# Patient Record
Sex: MALE | Race: WHITE | NOT HISPANIC OR LATINO | Employment: OTHER | URBAN - METROPOLITAN AREA
[De-identification: names, ages, dates, MRNs, and addresses within clinical notes are randomized per-mention and may not be internally consistent; named-entity substitution may affect disease eponyms.]

---

## 2023-03-26 ENCOUNTER — ANESTHESIA EVENT (OUTPATIENT)
Dept: ENDOSCOPY | Facility: HOSPITAL | Age: 68
DRG: 023 | End: 2023-03-26
Payer: MEDICARE

## 2023-03-26 ENCOUNTER — ANESTHESIA (OUTPATIENT)
Dept: ENDOSCOPY | Facility: HOSPITAL | Age: 68
DRG: 023 | End: 2023-03-26
Payer: MEDICARE

## 2023-03-26 ENCOUNTER — HOSPITAL ENCOUNTER (INPATIENT)
Facility: HOSPITAL | Age: 68
LOS: 2 days | Discharge: HOME | DRG: 023 | End: 2023-03-28
Attending: EMERGENCY MEDICINE | Admitting: PSYCHIATRY & NEUROLOGY
Payer: MEDICARE

## 2023-03-26 DIAGNOSIS — I63.9 ISCHEMIC STROKE: ICD-10-CM

## 2023-03-26 DIAGNOSIS — I63.412 ACUTE CEREBROVASCULAR ACCIDENT (CVA) DUE TO EMBOLISM OF LEFT MIDDLE CEREBRAL ARTERY: ICD-10-CM

## 2023-03-26 DIAGNOSIS — I63.9 STROKE: Primary | ICD-10-CM

## 2023-03-26 DIAGNOSIS — I42.2 APICAL VARIANT HYPERTROPHIC CARDIOMYOPATHY: ICD-10-CM

## 2023-03-26 PROBLEM — C61 PROSTATE CANCER: Status: RESOLVED | Noted: 2023-03-26 | Resolved: 2023-03-26

## 2023-03-26 PROBLEM — C61 PROSTATE CANCER: Status: ACTIVE | Noted: 2023-03-26

## 2023-03-26 PROBLEM — G93.6 CYTOTOXIC BRAIN EDEMA: Status: ACTIVE | Noted: 2023-03-26

## 2023-03-26 PROBLEM — Z85.46 HISTORY OF MALIGNANT NEOPLASM OF PROSTATE: Status: ACTIVE | Noted: 2023-03-26

## 2023-03-26 PROBLEM — Z92.82 RECEIVED INTRAVENOUS TISSUE PLASMINOGEN ACTIVATOR (TPA) IN EMERGENCY DEPARTMENT: Status: ACTIVE | Noted: 2023-03-26

## 2023-03-26 PROBLEM — R47.01 APHASIA: Status: ACTIVE | Noted: 2023-03-26

## 2023-03-26 LAB
ABO + RH BLD: NORMAL
ALBUMIN SERPL BCP-MCNC: 3.6 G/DL (ref 3.5–5.2)
ALLENS TEST: NORMAL
ALP SERPL-CCNC: 75 U/L (ref 55–135)
ALT SERPL W/O P-5'-P-CCNC: 22 U/L (ref 10–44)
ANION GAP SERPL CALC-SCNC: 10 MMOL/L (ref 8–16)
ANION GAP SERPL CALC-SCNC: 11 MMOL/L (ref 8–16)
ASCENDING AORTA: 3.14 CM
AST SERPL-CCNC: 42 U/L (ref 10–40)
AV INDEX (PROSTH): 0.93
AV MEAN GRADIENT: 4 MMHG
AV PEAK GRADIENT: 7 MMHG
AV VALVE AREA: 3.1 CM2
AV VELOCITY RATIO: 1.02
BASOPHILS # BLD AUTO: 0.02 K/UL (ref 0–0.2)
BASOPHILS NFR BLD: 0.3 % (ref 0–1.9)
BILIRUB SERPL-MCNC: 0.4 MG/DL (ref 0.1–1)
BLD GP AB SCN CELLS X3 SERPL QL: NORMAL
BSA FOR ECHO PROCEDURE: 2.05 M2
BUN SERPL-MCNC: 20 MG/DL (ref 8–23)
BUN SERPL-MCNC: 22 MG/DL (ref 8–23)
CALCIUM SERPL-MCNC: 8.7 MG/DL (ref 8.7–10.5)
CALCIUM SERPL-MCNC: 9.4 MG/DL (ref 8.7–10.5)
CHLORIDE SERPL-SCNC: 104 MMOL/L (ref 95–110)
CHLORIDE SERPL-SCNC: 104 MMOL/L (ref 95–110)
CHOLEST SERPL-MCNC: 174 MG/DL (ref 120–199)
CHOLEST/HDLC SERPL: 6.7 {RATIO} (ref 2–5)
CK MB SERPL-MCNC: 1 NG/ML (ref 0.1–6.5)
CK MB SERPL-RTO: 1.7 % (ref 0–5)
CK SERPL-CCNC: 60 U/L (ref 20–200)
CO2 SERPL-SCNC: 19 MMOL/L (ref 23–29)
CO2 SERPL-SCNC: 21 MMOL/L (ref 23–29)
COMPLEXED PSA SERPL-MCNC: 0.03 NG/ML (ref 0–4)
CREAT SERPL-MCNC: 1 MG/DL (ref 0.5–1.4)
CREAT SERPL-MCNC: 1.1 MG/DL (ref 0.5–1.4)
CREAT SERPL-MCNC: 1.2 MG/DL (ref 0.5–1.4)
CV ECHO LV RWT: 0.38 CM
DIFFERENTIAL METHOD: ABNORMAL
DOP CALC AO PEAK VEL: 1.35 M/S
DOP CALC AO VTI: 25.98 CM
DOP CALC LVOT AREA: 3.3 CM2
DOP CALC LVOT DIAMETER: 2.06 CM
DOP CALC LVOT PEAK VEL: 1.38 M/S
DOP CALC LVOT STROKE VOLUME: 80.48 CM3
DOP CALCLVOT PEAK VEL VTI: 24.16 CM
E WAVE DECELERATION TIME: 299.31 MSEC
E/A RATIO: 1.2
E/E' RATIO: 7.33 M/S
ECHO LV POSTERIOR WALL: 0.82 CM (ref 0.6–1.1)
EJECTION FRACTION: 50 %
EOSINOPHIL # BLD AUTO: 0.1 K/UL (ref 0–0.5)
EOSINOPHIL NFR BLD: 2.4 % (ref 0–8)
ERYTHROCYTE [DISTWIDTH] IN BLOOD BY AUTOMATED COUNT: 12.3 % (ref 11.5–14.5)
EST. GFR  (NO RACE VARIABLE): >60 ML/MIN/1.73 M^2
EST. GFR  (NO RACE VARIABLE): >60 ML/MIN/1.73 M^2
ESTIMATED AVG GLUCOSE: 103 MG/DL (ref 68–131)
FRACTIONAL SHORTENING: 32 % (ref 28–44)
GLUCOSE SERPL-MCNC: 137 MG/DL (ref 70–110)
GLUCOSE SERPL-MCNC: 86 MG/DL (ref 70–110)
HBA1C MFR BLD: 5.2 % (ref 4–5.6)
HCT VFR BLD AUTO: 37.5 % (ref 40–54)
HCV AB SERPL QL IA: NORMAL
HDLC SERPL-MCNC: 26 MG/DL (ref 40–75)
HDLC SERPL: 14.9 % (ref 20–50)
HGB BLD-MCNC: 12.8 G/DL (ref 14–18)
HIV 1+2 AB+HIV1 P24 AG SERPL QL IA: NORMAL
IMM GRANULOCYTES # BLD AUTO: 0.02 K/UL (ref 0–0.04)
IMM GRANULOCYTES NFR BLD AUTO: 0.3 % (ref 0–0.5)
INR PPP: 1.1 (ref 0.8–1.2)
INTERVENTRICULAR SEPTUM: 0.82 CM (ref 0.6–1.1)
IVRT: 97.05 MSEC
LA MAJOR: 5.6 CM
LA MINOR: 5.43 CM
LA WIDTH: 4.39 CM
LDH SERPL L TO P-CCNC: 1.67 MMOL/L (ref 0.5–2.2)
LDLC SERPL CALC-MCNC: ABNORMAL MG/DL (ref 63–159)
LEFT ATRIUM SIZE: 3.49 CM
LEFT ATRIUM VOLUME INDEX MOD: 28.2 ML/M2
LEFT ATRIUM VOLUME INDEX: 35.9 ML/M2
LEFT ATRIUM VOLUME MOD: 56.35 CM3
LEFT ATRIUM VOLUME: 71.8 CM3
LEFT INTERNAL DIMENSION IN SYSTOLE: 2.98 CM (ref 2.1–4)
LEFT VENTRICLE DIASTOLIC VOLUME INDEX: 42.82 ML/M2
LEFT VENTRICLE DIASTOLIC VOLUME: 85.63 ML
LEFT VENTRICLE MASS INDEX: 56 G/M2
LEFT VENTRICLE SYSTOLIC VOLUME INDEX: 17.3 ML/M2
LEFT VENTRICLE SYSTOLIC VOLUME: 34.54 ML
LEFT VENTRICULAR INTERNAL DIMENSION IN DIASTOLE: 4.36 CM (ref 3.5–6)
LEFT VENTRICULAR MASS: 111.35 G
LV LATERAL E/E' RATIO: 6.88 M/S
LV SEPTAL E/E' RATIO: 7.86 M/S
LYMPHOCYTES # BLD AUTO: 2 K/UL (ref 1–4.8)
LYMPHOCYTES NFR BLD: 34.6 % (ref 18–48)
MAGNESIUM SERPL-MCNC: 1.9 MG/DL (ref 1.6–2.6)
MCH RBC QN AUTO: 29.7 PG (ref 27–31)
MCHC RBC AUTO-ENTMCNC: 34.1 G/DL (ref 32–36)
MCV RBC AUTO: 87 FL (ref 82–98)
MONOCYTES # BLD AUTO: 0.6 K/UL (ref 0.3–1)
MONOCYTES NFR BLD: 10.6 % (ref 4–15)
MV PEAK A VEL: 0.46 M/S
MV PEAK E VEL: 0.55 M/S
MV STENOSIS PRESSURE HALF TIME: 86.8 MS
MV VALVE AREA P 1/2 METHOD: 2.53 CM2
NEUTROPHILS # BLD AUTO: 3 K/UL (ref 1.8–7.7)
NEUTROPHILS NFR BLD: 51.8 % (ref 38–73)
NONHDLC SERPL-MCNC: 148 MG/DL
NRBC BLD-RTO: 0 /100 WBC
PHOSPHATE SERPL-MCNC: 3.7 MG/DL (ref 2.7–4.5)
PISA TR MAX VEL: 1.51 M/S
PLATELET # BLD AUTO: 194 K/UL (ref 150–450)
PMV BLD AUTO: 9.7 FL (ref 9.2–12.9)
POCT GLUCOSE: 117 MG/DL (ref 70–110)
POCT GLUCOSE: 132 MG/DL (ref 70–110)
POTASSIUM SERPL-SCNC: 3.2 MMOL/L (ref 3.5–5.1)
POTASSIUM SERPL-SCNC: 4.9 MMOL/L (ref 3.5–5.1)
PROT SERPL-MCNC: 6 G/DL (ref 6–8.4)
PROTHROMBIN TIME: 11.8 SEC (ref 9–12.5)
RA MAJOR: 4.39 CM
RA PRESSURE: 8 MMHG
RA WIDTH: 3.01 CM
RBC # BLD AUTO: 4.31 M/UL (ref 4.6–6.2)
RIGHT VENTRICULAR END-DIASTOLIC DIMENSION: 2.72 CM
RV TISSUE DOPPLER FREE WALL SYSTOLIC VELOCITY 1 (APICAL 4 CHAMBER VIEW): 15.19 CM/S
SAMPLE: NORMAL
SAMPLE: NORMAL
SINUS: 3.84 CM
SITE: NORMAL
SODIUM SERPL-SCNC: 133 MMOL/L (ref 136–145)
SODIUM SERPL-SCNC: 136 MMOL/L (ref 136–145)
SPECIMEN OUTDATE: NORMAL
STJ: 2.64 CM
TDI LATERAL: 0.08 M/S
TDI SEPTAL: 0.07 M/S
TDI: 0.08 M/S
TR MAX PG: 9 MMHG
TRICUSPID ANNULAR PLANE SYSTOLIC EXCURSION: 1.68 CM
TRIGL SERPL-MCNC: 561 MG/DL (ref 30–150)
TROPONIN I SERPL DL<=0.01 NG/ML-MCNC: 0.01 NG/ML (ref 0–0.03)
TROPONIN I SERPL DL<=0.01 NG/ML-MCNC: 0.01 NG/ML (ref 0–0.03)
TSH SERPL DL<=0.005 MIU/L-ACNC: 3.35 UIU/ML (ref 0.4–4)
TV REST PULMONARY ARTERY PRESSURE: 17 MMHG
WBC # BLD AUTO: 5.84 K/UL (ref 3.9–12.7)

## 2023-03-26 PROCEDURE — 83036 HEMOGLOBIN GLYCOSYLATED A1C: CPT | Performed by: EMERGENCY MEDICINE

## 2023-03-26 PROCEDURE — 99291 PR CRITICAL CARE, E/M 30-74 MINUTES: ICD-10-PCS | Mod: 25,,,

## 2023-03-26 PROCEDURE — 93005 ELECTROCARDIOGRAM TRACING: CPT

## 2023-03-26 PROCEDURE — 85610 PROTHROMBIN TIME: CPT | Performed by: EMERGENCY MEDICINE

## 2023-03-26 PROCEDURE — 92610 EVALUATE SWALLOWING FUNCTION: CPT

## 2023-03-26 PROCEDURE — D9220A PRA ANESTHESIA: ICD-10-PCS | Mod: ANES,,, | Performed by: ANESTHESIOLOGY

## 2023-03-26 PROCEDURE — 84443 ASSAY THYROID STIM HORMONE: CPT | Performed by: EMERGENCY MEDICINE

## 2023-03-26 PROCEDURE — 84484 ASSAY OF TROPONIN QUANT: CPT

## 2023-03-26 PROCEDURE — 84153 ASSAY OF PSA TOTAL: CPT

## 2023-03-26 PROCEDURE — 85025 COMPLETE CBC W/AUTO DIFF WBC: CPT | Performed by: EMERGENCY MEDICINE

## 2023-03-26 PROCEDURE — 97116 GAIT TRAINING THERAPY: CPT

## 2023-03-26 PROCEDURE — 99291 CRITICAL CARE FIRST HOUR: CPT | Mod: 25,,,

## 2023-03-26 PROCEDURE — 82553 CREATINE MB FRACTION: CPT

## 2023-03-26 PROCEDURE — 25000003 PHARM REV CODE 250

## 2023-03-26 PROCEDURE — 85610 PROTHROMBIN TIME: CPT

## 2023-03-26 PROCEDURE — 84100 ASSAY OF PHOSPHORUS: CPT

## 2023-03-26 PROCEDURE — D9220A PRA ANESTHESIA: Mod: CRNA,,, | Performed by: NURSE ANESTHETIST, CERTIFIED REGISTERED

## 2023-03-26 PROCEDURE — 83735 ASSAY OF MAGNESIUM: CPT

## 2023-03-26 PROCEDURE — 63600175 PHARM REV CODE 636 W HCPCS: Performed by: NURSE ANESTHETIST, CERTIFIED REGISTERED

## 2023-03-26 PROCEDURE — 80061 LIPID PANEL: CPT | Performed by: EMERGENCY MEDICINE

## 2023-03-26 PROCEDURE — 96374 THER/PROPH/DIAG INJ IV PUSH: CPT

## 2023-03-26 PROCEDURE — 99900035 HC TECH TIME PER 15 MIN (STAT)

## 2023-03-26 PROCEDURE — 97165 OT EVAL LOW COMPLEX 30 MIN: CPT

## 2023-03-26 PROCEDURE — 63600175 PHARM REV CODE 636 W HCPCS

## 2023-03-26 PROCEDURE — 25500020 PHARM REV CODE 255: Performed by: PSYCHIATRY & NEUROLOGY

## 2023-03-26 PROCEDURE — 99291 PR CRITICAL CARE, E/M 30-74 MINUTES: ICD-10-PCS | Mod: ,,, | Performed by: EMERGENCY MEDICINE

## 2023-03-26 PROCEDURE — 99285 EMERGENCY DEPT VISIT HI MDM: CPT | Mod: 25

## 2023-03-26 PROCEDURE — 25000003 PHARM REV CODE 250: Performed by: RADIOLOGY

## 2023-03-26 PROCEDURE — 97161 PT EVAL LOW COMPLEX 20 MIN: CPT

## 2023-03-26 PROCEDURE — 37000009 HC ANESTHESIA EA ADD 15 MINS

## 2023-03-26 PROCEDURE — D9220A PRA ANESTHESIA: ICD-10-PCS | Mod: CRNA,,, | Performed by: NURSE ANESTHETIST, CERTIFIED REGISTERED

## 2023-03-26 PROCEDURE — 99291 CRITICAL CARE FIRST HOUR: CPT | Mod: ,,, | Performed by: EMERGENCY MEDICINE

## 2023-03-26 PROCEDURE — 25000003 PHARM REV CODE 250: Performed by: NURSE PRACTITIONER

## 2023-03-26 PROCEDURE — 25500020 PHARM REV CODE 255: Performed by: EMERGENCY MEDICINE

## 2023-03-26 PROCEDURE — 93010 ELECTROCARDIOGRAM REPORT: CPT | Mod: ,,, | Performed by: INTERNAL MEDICINE

## 2023-03-26 PROCEDURE — 99223 PR INITIAL HOSPITAL CARE,LEVL III: ICD-10-PCS | Mod: ,,, | Performed by: PSYCHIATRY & NEUROLOGY

## 2023-03-26 PROCEDURE — 82565 ASSAY OF CREATININE: CPT

## 2023-03-26 PROCEDURE — D9220A PRA ANESTHESIA: Mod: ANES,,, | Performed by: ANESTHESIOLOGY

## 2023-03-26 PROCEDURE — 37000008 HC ANESTHESIA 1ST 15 MINUTES

## 2023-03-26 PROCEDURE — 99292 PR CRITICAL CARE, ADDL 30 MIN: ICD-10-PCS | Mod: ,,, | Performed by: PSYCHIATRY & NEUROLOGY

## 2023-03-26 PROCEDURE — 25000003 PHARM REV CODE 250: Performed by: NURSE ANESTHETIST, CERTIFIED REGISTERED

## 2023-03-26 PROCEDURE — 87389 HIV-1 AG W/HIV-1&-2 AB AG IA: CPT | Performed by: EMERGENCY MEDICINE

## 2023-03-26 PROCEDURE — 63600175 PHARM REV CODE 636 W HCPCS: Mod: JG | Performed by: NURSE PRACTITIONER

## 2023-03-26 PROCEDURE — 93010 EKG 12-LEAD: ICD-10-PCS | Mod: ,,, | Performed by: INTERNAL MEDICINE

## 2023-03-26 PROCEDURE — A4216 STERILE WATER/SALINE, 10 ML: HCPCS | Performed by: NURSE PRACTITIONER

## 2023-03-26 PROCEDURE — 94761 N-INVAS EAR/PLS OXIMETRY MLT: CPT

## 2023-03-26 PROCEDURE — 25000003 PHARM REV CODE 250: Performed by: PSYCHIATRY & NEUROLOGY

## 2023-03-26 PROCEDURE — 97112 NEUROMUSCULAR REEDUCATION: CPT

## 2023-03-26 PROCEDURE — 86900 BLOOD TYPING SEROLOGIC ABO: CPT

## 2023-03-26 PROCEDURE — 84484 ASSAY OF TROPONIN QUANT: CPT | Mod: 91 | Performed by: PSYCHIATRY & NEUROLOGY

## 2023-03-26 PROCEDURE — 99223 1ST HOSP IP/OBS HIGH 75: CPT | Mod: ,,, | Performed by: PSYCHIATRY & NEUROLOGY

## 2023-03-26 PROCEDURE — 97535 SELF CARE MNGMENT TRAINING: CPT

## 2023-03-26 PROCEDURE — 80048 BASIC METABOLIC PNL TOTAL CA: CPT | Mod: XB | Performed by: PSYCHIATRY & NEUROLOGY

## 2023-03-26 PROCEDURE — 20000000 HC ICU ROOM

## 2023-03-26 PROCEDURE — 80053 COMPREHEN METABOLIC PANEL: CPT | Performed by: EMERGENCY MEDICINE

## 2023-03-26 PROCEDURE — 86803 HEPATITIS C AB TEST: CPT | Performed by: EMERGENCY MEDICINE

## 2023-03-26 PROCEDURE — 99292 CRITICAL CARE ADDL 30 MIN: CPT | Mod: ,,, | Performed by: PSYCHIATRY & NEUROLOGY

## 2023-03-26 RX ORDER — ATROPINE SULFATE 0.1 MG/ML
INJECTION INTRAVENOUS
Status: DISPENSED
Start: 2023-03-26 | End: 2023-03-26

## 2023-03-26 RX ORDER — FLUOXETINE 10 MG/1
CAPSULE ORAL
COMMUNITY
Start: 2023-03-14

## 2023-03-26 RX ORDER — FENTANYL CITRATE 50 UG/ML
INJECTION, SOLUTION INTRAMUSCULAR; INTRAVENOUS
Status: DISCONTINUED | OUTPATIENT
Start: 2023-03-26 | End: 2023-03-26

## 2023-03-26 RX ORDER — NICARDIPINE HYDROCHLORIDE 0.2 MG/ML
0-15 INJECTION INTRAVENOUS CONTINUOUS
Status: DISCONTINUED | OUTPATIENT
Start: 2023-03-26 | End: 2023-03-26

## 2023-03-26 RX ORDER — POTASSIUM CHLORIDE 7.45 MG/ML
60 INJECTION INTRAVENOUS ONCE
Status: COMPLETED | OUTPATIENT
Start: 2023-03-26 | End: 2023-03-26

## 2023-03-26 RX ORDER — PROPOFOL 10 MG/ML
VIAL (ML) INTRAVENOUS
Status: DISCONTINUED | OUTPATIENT
Start: 2023-03-26 | End: 2023-03-26

## 2023-03-26 RX ORDER — TELMISARTAN 80 MG/1
1 TABLET ORAL
Status: ON HOLD | COMMUNITY
End: 2023-03-28 | Stop reason: HOSPADM

## 2023-03-26 RX ORDER — SODIUM,POTASSIUM PHOSPHATES 280-250MG
2 POWDER IN PACKET (EA) ORAL
Status: DISCONTINUED | OUTPATIENT
Start: 2023-03-26 | End: 2023-03-28

## 2023-03-26 RX ORDER — VERAPAMIL HYDROCHLORIDE 2.5 MG/ML
INJECTION, SOLUTION INTRAVENOUS
Status: COMPLETED | OUTPATIENT
Start: 2023-03-26 | End: 2023-03-26

## 2023-03-26 RX ORDER — ZINC GLUCONATE 50 MG
1 TABLET ORAL
COMMUNITY

## 2023-03-26 RX ORDER — LIDOCAINE HYDROCHLORIDE 20 MG/ML
INJECTION, SOLUTION EPIDURAL; INFILTRATION; INTRACAUDAL; PERINEURAL
Status: DISCONTINUED | OUTPATIENT
Start: 2023-03-26 | End: 2023-03-26

## 2023-03-26 RX ORDER — CETIRIZINE HYDROCHLORIDE 10 MG/1
1 CAPSULE, LIQUID FILLED ORAL
COMMUNITY

## 2023-03-26 RX ORDER — ATORVASTATIN CALCIUM 40 MG/1
40 TABLET, FILM COATED ORAL DAILY
Status: DISCONTINUED | OUTPATIENT
Start: 2023-03-26 | End: 2023-03-28 | Stop reason: HOSPADM

## 2023-03-26 RX ORDER — FLUOXETINE 10 MG/1
10 CAPSULE ORAL DAILY
Status: DISCONTINUED | OUTPATIENT
Start: 2023-03-26 | End: 2023-03-26

## 2023-03-26 RX ORDER — ACETAMINOPHEN 325 MG/1
650 TABLET ORAL EVERY 6 HOURS PRN
Status: DISCONTINUED | OUTPATIENT
Start: 2023-03-26 | End: 2023-03-28 | Stop reason: HOSPADM

## 2023-03-26 RX ORDER — SODIUM CHLORIDE 0.9 % (FLUSH) 0.9 %
10 SYRINGE (ML) INJECTION ONCE
Status: COMPLETED | OUTPATIENT
Start: 2023-03-26 | End: 2023-03-26

## 2023-03-26 RX ORDER — BUPROPION HYDROCHLORIDE 150 MG/1
150 TABLET ORAL DAILY
Status: DISCONTINUED | OUTPATIENT
Start: 2023-03-26 | End: 2023-03-26

## 2023-03-26 RX ORDER — TURMERIC 400 MG
CAPSULE ORAL
COMMUNITY

## 2023-03-26 RX ORDER — SODIUM CHLORIDE 0.9 % (FLUSH) 0.9 %
10 SYRINGE (ML) INJECTION
Status: DISCONTINUED | OUTPATIENT
Start: 2023-03-26 | End: 2023-03-28 | Stop reason: HOSPADM

## 2023-03-26 RX ORDER — ONDANSETRON 2 MG/ML
INJECTION INTRAMUSCULAR; INTRAVENOUS
Status: COMPLETED
Start: 2023-03-26 | End: 2023-03-26

## 2023-03-26 RX ORDER — MUPIROCIN 20 MG/G
OINTMENT TOPICAL 2 TIMES DAILY
Status: DISCONTINUED | OUTPATIENT
Start: 2023-03-26 | End: 2023-03-28 | Stop reason: HOSPADM

## 2023-03-26 RX ORDER — FLUOXETINE HYDROCHLORIDE 20 MG/1
20 CAPSULE ORAL DAILY
Status: DISCONTINUED | OUTPATIENT
Start: 2023-03-26 | End: 2023-03-28 | Stop reason: HOSPADM

## 2023-03-26 RX ORDER — HYDRALAZINE HYDROCHLORIDE 20 MG/ML
10 INJECTION INTRAMUSCULAR; INTRAVENOUS EVERY 4 HOURS PRN
Status: DISCONTINUED | OUTPATIENT
Start: 2023-03-26 | End: 2023-03-28 | Stop reason: HOSPADM

## 2023-03-26 RX ORDER — ONDANSETRON 2 MG/ML
INJECTION INTRAMUSCULAR; INTRAVENOUS
Status: DISCONTINUED | OUTPATIENT
Start: 2023-03-26 | End: 2023-03-26

## 2023-03-26 RX ORDER — CARVEDILOL 25 MG/1
1 TABLET ORAL
Status: ON HOLD | COMMUNITY
End: 2023-03-28 | Stop reason: CLARIF

## 2023-03-26 RX ORDER — ROCURONIUM BROMIDE 10 MG/ML
INJECTION, SOLUTION INTRAVENOUS
Status: DISCONTINUED | OUTPATIENT
Start: 2023-03-26 | End: 2023-03-26

## 2023-03-26 RX ORDER — IBUPROFEN 100 MG/5ML
1 SUSPENSION, ORAL (FINAL DOSE FORM) ORAL
COMMUNITY

## 2023-03-26 RX ORDER — HYDRALAZINE HYDROCHLORIDE 20 MG/ML
INJECTION INTRAMUSCULAR; INTRAVENOUS
Status: COMPLETED
Start: 2023-03-26 | End: 2023-03-26

## 2023-03-26 RX ORDER — CARVEDILOL 25 MG/1
25 TABLET ORAL 2 TIMES DAILY
Status: DISCONTINUED | OUTPATIENT
Start: 2023-03-26 | End: 2023-03-28 | Stop reason: HOSPADM

## 2023-03-26 RX ORDER — DEXAMETHASONE SODIUM PHOSPHATE 4 MG/ML
INJECTION, SOLUTION INTRA-ARTICULAR; INTRALESIONAL; INTRAMUSCULAR; INTRAVENOUS; SOFT TISSUE
Status: DISCONTINUED | OUTPATIENT
Start: 2023-03-26 | End: 2023-03-26

## 2023-03-26 RX ORDER — CEFDINIR 300 MG/1
300 CAPSULE ORAL 2 TIMES DAILY
Status: ON HOLD | COMMUNITY
Start: 2022-10-31 | End: 2023-03-28 | Stop reason: CLARIF

## 2023-03-26 RX ORDER — AMOXICILLIN 250 MG
1 CAPSULE ORAL 2 TIMES DAILY
Status: DISCONTINUED | OUTPATIENT
Start: 2023-03-26 | End: 2023-03-28 | Stop reason: HOSPADM

## 2023-03-26 RX ORDER — ONDANSETRON 2 MG/ML
4 INJECTION INTRAMUSCULAR; INTRAVENOUS EVERY 6 HOURS PRN
Status: DISCONTINUED | OUTPATIENT
Start: 2023-03-26 | End: 2023-03-28 | Stop reason: HOSPADM

## 2023-03-26 RX ORDER — CARVEDILOL 25 MG/1
25 TABLET ORAL 2 TIMES DAILY
COMMUNITY
Start: 2023-01-28 | End: 2023-05-03 | Stop reason: SDUPTHER

## 2023-03-26 RX ORDER — LOSARTAN POTASSIUM 50 MG/1
100 TABLET ORAL DAILY
Status: DISCONTINUED | OUTPATIENT
Start: 2023-03-26 | End: 2023-03-26

## 2023-03-26 RX ORDER — PHENYLEPHRINE HCL IN 0.9% NACL 1 MG/10 ML
SYRINGE (ML) INTRAVENOUS
Status: DISCONTINUED | OUTPATIENT
Start: 2023-03-26 | End: 2023-03-26

## 2023-03-26 RX ORDER — LANOLIN ALCOHOL/MO/W.PET/CERES
800 CREAM (GRAM) TOPICAL
Status: DISCONTINUED | OUTPATIENT
Start: 2023-03-26 | End: 2023-03-28

## 2023-03-26 RX ORDER — ATORVASTATIN CALCIUM 20 MG/1
20 TABLET, FILM COATED ORAL
COMMUNITY
Start: 2023-03-21 | End: 2023-05-03 | Stop reason: SDUPTHER

## 2023-03-26 RX ORDER — TELMISARTAN 80 MG/1
80 TABLET ORAL
COMMUNITY
Start: 2023-01-22 | End: 2023-05-03 | Stop reason: SDUPTHER

## 2023-03-26 RX ORDER — ATORVASTATIN CALCIUM 20 MG/1
1 TABLET, FILM COATED ORAL
Status: ON HOLD | COMMUNITY
End: 2023-03-28 | Stop reason: CLARIF

## 2023-03-26 RX ADMIN — SENNOSIDES AND DOCUSATE SODIUM 1 TABLET: 50; 8.6 TABLET ORAL at 09:03

## 2023-03-26 RX ADMIN — ONDANSETRON 0.5 G: 2 INJECTION INTRAMUSCULAR; INTRAVENOUS at 03:03

## 2023-03-26 RX ADMIN — Medication 10 ML: at 01:03

## 2023-03-26 RX ADMIN — HYDRALAZINE HYDROCHLORIDE 10 MG: 20 INJECTION, SOLUTION INTRAMUSCULAR; INTRAVENOUS at 04:03

## 2023-03-26 RX ADMIN — SUGAMMADEX 200 MG: 100 INJECTION, SOLUTION INTRAVENOUS at 03:03

## 2023-03-26 RX ADMIN — VERAPAMIL HYDROCHLORIDE 5 MG: 2.5 INJECTION, SOLUTION INTRAVENOUS at 03:03

## 2023-03-26 RX ADMIN — HUMAN ALBUMIN MICROSPHERES AND PERFLUTREN 0.11 MG: 10; .22 INJECTION, SOLUTION INTRAVENOUS at 03:03

## 2023-03-26 RX ADMIN — DEXAMETHASONE SODIUM PHOSPHATE 8 MG: 4 INJECTION INTRA-ARTICULAR; INTRALESIONAL; INTRAMUSCULAR; INTRAVENOUS; SOFT TISSUE at 02:03

## 2023-03-26 RX ADMIN — POTASSIUM CHLORIDE 60 MEQ: 7.46 INJECTION, SOLUTION INTRAVENOUS at 05:03

## 2023-03-26 RX ADMIN — MUPIROCIN: 20 OINTMENT TOPICAL at 09:03

## 2023-03-26 RX ADMIN — PROPOFOL 100 MG: 10 INJECTION, EMULSION INTRAVENOUS at 02:03

## 2023-03-26 RX ADMIN — NICARDIPINE HYDROCHLORIDE 2.5 MG/HR: 0.2 INJECTION, SOLUTION INTRAVENOUS at 06:03

## 2023-03-26 RX ADMIN — TENECTEPLASE 22.5 MG: KIT at 01:03

## 2023-03-26 RX ADMIN — ATORVASTATIN CALCIUM 40 MG: 40 TABLET, FILM COATED ORAL at 09:03

## 2023-03-26 RX ADMIN — ONDANSETRON 4 MG: 2 INJECTION INTRAMUSCULAR; INTRAVENOUS at 05:03

## 2023-03-26 RX ADMIN — Medication 100 MCG: at 03:03

## 2023-03-26 RX ADMIN — FLUOXETINE 20 MG: 20 CAPSULE ORAL at 04:03

## 2023-03-26 RX ADMIN — FENTANYL CITRATE 100 MCG: 50 INJECTION INTRAMUSCULAR; INTRAVENOUS at 02:03

## 2023-03-26 RX ADMIN — ROCURONIUM BROMIDE 20 MG: 10 INJECTION INTRAVENOUS at 03:03

## 2023-03-26 RX ADMIN — ONDANSETRON 4 MG: 2 INJECTION INTRAMUSCULAR; INTRAVENOUS at 02:03

## 2023-03-26 RX ADMIN — SODIUM CHLORIDE, SODIUM GLUCONATE, SODIUM ACETATE, POTASSIUM CHLORIDE, MAGNESIUM CHLORIDE, SODIUM PHOSPHATE, DIBASIC, AND POTASSIUM PHOSPHATE: .53; .5; .37; .037; .03; .012; .00082 INJECTION, SOLUTION INTRAVENOUS at 02:03

## 2023-03-26 RX ADMIN — LIDOCAINE HYDROCHLORIDE 50 MG: 20 INJECTION, SOLUTION EPIDURAL; INFILTRATION; INTRACAUDAL at 02:03

## 2023-03-26 RX ADMIN — ROCURONIUM BROMIDE 45 MG: 10 INJECTION INTRAVENOUS at 02:03

## 2023-03-26 RX ADMIN — Medication 200 MCG: at 03:03

## 2023-03-26 RX ADMIN — IOHEXOL 100 ML: 350 INJECTION, SOLUTION INTRAVENOUS at 01:03

## 2023-03-26 RX ADMIN — HYDRALAZINE HYDROCHLORIDE 10 MG: 20 INJECTION INTRAMUSCULAR; INTRAVENOUS at 04:03

## 2023-03-26 RX ADMIN — ROCURONIUM BROMIDE 5 MG: 10 INJECTION INTRAVENOUS at 02:03

## 2023-03-26 NOTE — ASSESSMENT & PLAN NOTE
SBP < 180 s/p TNK and prior to IR  SBP < 140 pending successful thrombectomy  Telmisartan 80 daily and Coreg 25 bid at home, hold in acute setting  PRN labetalol, hydralazine

## 2023-03-26 NOTE — CONSULTS
[facility] - Neuro Critical Care  Vascular Neurology  Comprehensive Stroke Center  Consult Note    Inpatient consult to Vascular (Stroke) Neurology  Consult performed by: Barby Espinal, JAMAL, NP  Consult ordered by: Estephanie Mcallister MD  Reason for consult: L MCA stroke, post TNK        Assessment/Plan:     Received intravenous tissue plasminogen activator (tPA) in emergency department  -s/p TNK in the ED.  Admitted to [facility] for close post thrombolytic monitoring.    Acute cerebrovascular accident (CVA) due to embolism of left middle cerebral artery  Peter Munoz is a 67 y.o. male with a significant medical history of HTN, HLD, prostate CA (2022) who presents to the hospital for evaluation of aphasia and RSW.   He was given TNK and taken to IR for endovascular intervention.    Antithrombotics for secondary stroke prevention: Antiplatelets: None: Hold all Antithrombotics x 24 hours after IV Thrombolytic therapy administration    Statins for secondary stroke prevention and hyperlipidemia, if present:   Statins: Atorvastatin- 40 mg daily    Aggressive risk factor modification: HTN, HLD     Rehab efforts: The patient has been evaluated by a stroke team provider and the therapy needs have been fully considered based off the presenting complaints and exam findings. The following therapy evaluations are needed: PT evaluate and treat, OT evaluate and treat, SLP evaluate and treat, PM&R evaluate for appropriate placement    Diagnostics ordered/pending: HgbA1C to assess blood glucose levels, Lipid Profile to assess cholesterol levels, MRI head without contrast to assess brain parenchyma, TTE to assess cardiac function/status , TSH to assess thyroid function    VTE prophylaxis: Mechanical prophylaxis: Place SCDs  None: Reason for No Pharmacological VTE Prophylaxis: Holding x 24 hours s/p treatment with Thrombolytic therapy    BP parameters: Infarct: Post sucessful thrombectomy, SBP <140        Aphasia  Right sided  weakness  Due to stroke.  Therapy evals pending.    Hypertrophic cardiomyopathy  -Stroke risk factor.  Documented in 2013, no TTE.  -TTE pending.    Cytotoxic brain edema  -Small area of cytotoxic cerebral edema identified when reviewing brain imaging in the territory of the L middle cerebral artery. There is no mass effect associated with it. We will continue to monitor the patients clinical exam with frequently while in neuro critical care, for any worsening of symptoms which may indicate expansion of the insult and/or area of the edema resulting in clinical change that may require acute intervention to prevent loss of function and/or death. The pattern is suggestive of embolic etiology.        History of malignant neoplasm of prostate  -Stroke risk factor.  Not currently on radiation or chemotherapy.    Mixed hyperlipidemia  -Stroke risk factor.  Triglycerides 561.  LDL invalid.  -Atorvastatin 40 mg daily    Hypertension  -Stroke risk factor.  SBP<140 post successful thrombectomy.      STROKE DOCUMENTATION     Acute Stroke Times   Last Known Normal Date: 03/25/23  Last Known Normal Time: 2230  Symptom Onset Date: 03/26/23  Symptom Onset Time: 0015  Stroke Team Called Date: 03/26/23  Stroke Team Called Time: 0114  Stroke Team Arrival Date: 03/26/23  Stroke Team Arrival Time: 0118 (In ED prior to the patient's arrival)  CT Interpretation Time: 0132  Thrombolytic Therapy Recommended: Yes  CTA Interpretation Time: 0137  Thrombectomy Recommended: Yes  Decision to Treat Time for Tenecteplase: 0132  Decision to Treat Time for IR: 0146    NIH Scale:  Interval: baseline  1a. Level of Consciousness: 0-->Alert, keenly responsive  1b. LOC Questions: 2-->Answers neither question correctly  1c. LOC Commands: 1-->Performs one task correctly  2. Best Gaze: 1-->Partial gaze palsy, gaze is abnormal in one or both eyes, but forced deviation or total gaze paresis is not present  3. Visual: 2-->Complete hemianopia  4. Facial Palsy:  2-->Partial paralysis (total or near-total paralysis of lower face)  5a. Motor Arm, Left: 0-->No drift, limb holds 90 (or 45) degrees for full 10 secs  5b. Motor Arm, Right: 4-->No movement  6a. Motor Leg, Left: 0-->No drift, leg holds 30 degree position for full 5 secs  6b. Motor Leg, Right: 3-->No effort against gravity, leg falls to bed immediately  7. Limb Ataxia: 0-->Absent  8. Sensory: 2-->Severe to total sensory loss, patient is not aware of being touched in the face, arm, and leg  9. Best Language: 3-->Mute, global aphasia, no usable speech or auditory comprehension  10. Dysarthria: 2-->Severe dysarthria, patients speech is so slurred as to be unintelligible in the absence of or out of proportion to any dysphasia, or is mute/anarthric  11. Extinction and Inattention (formerly Neglect): 2-->Profound salina-inattention/extinction more than 1 modality  Total (NIH Stroke Scale): 24    Modified Kym Score: 0  Jacqueline Coma Scale:11   ABCD2 Score:    [id number] Score:   HAS -BLED Score:   ICH Score:   Hunt & Waggoner Classification:       Thrombolysis Candidate? Yes. The risks and benefits of Tenecteplase were discussed with patient/family. Also discussed that medication is off-FDA label but that it is within the standard of care and appropriate for their treatment. The patient and/or family verbalized understanding of risks, benefits, off-label nature, and has given verbal consent for Tenecteplase. If patient was not competent, or no family was available, treatment will be administered as an emergency procedure and in what we believe to be the patients best interest.    Delays to Thrombolysis?  No    Interventional Revascularization Candidate?   Is the patient eligible for mechanical endovascular reperfusion (DIANA)?  Yes; TICI 3    Delays to Thrombectomy? No    Hemorrhagic change of an Ischemic Stroke: Does this patient have an ischemic stroke with hemorrhagic changes? No     Subjective:     History of Present  Illness:  Peter Munoz is a 67 y.o. male with a significant medical history of HTN, HLD, prostate CA (2022) who presents to the hospital for evaluation of aphasia and RSW.  HPI information gathered from review of the patient's medical record, patient's wife due to the patient having severe aphasia.  The patient was LKN around 2230.  Around 0030 the patient's wife heard a noise and woke up and noted the patient to have aphasia and right-sided weakness that continued to progress as she activated EMS.  On EMS arrival the patient was noted to have a right facial droop and right hemiplegia in addition to aphasia.  A stroke code was activated in the field and the patient was brought to the ED.  On arrival to the ED the patient is awake.  He has global aphasia, right facial droop, left gaze preference, left hemianopsia.  VAN+.  NIHSS 24.  He was taken to CT for a CTA Stroke MP.  Imaging reviewed by Dr. Sarkar.  CTH negative, CTA positive for L M1 occlusion.  Discussed the findings with the patient and his wife.  Explained the risks and benefits of thrombolytic therapy with them.  The patient's wife agreed to thrombolytic therapy.  Assessment post thrombolytic therapy mildly improved.  The patient's gaze preference resolved and he had some vocalizations though incomprehensible.  He remained w/RSW, facial droop, and expressive/receptive aphasia.  He was taken to IR for endovascular intervention.                History reviewed. No pertinent past medical history.  No past surgical history on file.  No family history on file.  Social History     Tobacco Use    Smoking status: Former   Substance Use Topics    Alcohol use: Yes     Review of patient's allergies indicates:  No Known Allergies    Medications: I have reviewed the current medication administration record.    Current Outpatient Medications   Medication Instructions    buPROPion (WELLBUTRIN XL) 150 mg, Daily         Review of Systems   Unable to perform ROS:  Patient nonverbal   HENT:  Positive for drooling.    Gastrointestinal:  Negative for diarrhea and vomiting.   Neurological:  Positive for facial asymmetry, speech difficulty, weakness and numbness.   Psychiatric/Behavioral:  Positive for confusion.    Objective:     Vital Signs (Most Recent):  Pulse: 69 (03/26/23 0204)  Resp: (!) 37 (03/26/23 0204)  BP: (!) 165/82 (03/26/23 0204)  SpO2: 96 % (03/26/23 0204)    Vital Signs Range (Last 24H):  Pulse:  [69]   Resp:  [16-37]   BP: (150-165)/(82-83)   SpO2:  [95 %-96 %]     Physical Exam  Vitals and nursing note reviewed.   Constitutional:       General: He is not in acute distress.     Appearance: He is well-developed. He is not diaphoretic.   HENT:      Head: Normocephalic and atraumatic.      Right Ear: External ear normal.      Left Ear: External ear normal.      Nose: Nose normal.      Mouth/Throat:      Mouth: Mucous membranes are moist.   Eyes:      General: No scleral icterus.        Right eye: No discharge.         Left eye: No discharge.      Extraocular Movements:      Right eye: Abnormal extraocular motion present.      Left eye: Abnormal extraocular motion present.      Conjunctiva/sclera: Conjunctivae normal.      Pupils: Pupils are equal, round, and reactive to light.      Comments: Left gaze preference   Cardiovascular:      Rate and Rhythm: Normal rate and regular rhythm.      Heart sounds: Normal heart sounds.   Pulmonary:      Effort: Pulmonary effort is normal. No respiratory distress.   Abdominal:      General: There is no distension.      Palpations: Abdomen is soft.      Tenderness: There is no abdominal tenderness.   Musculoskeletal:      Cervical back: Normal range of motion and neck supple.   Skin:     General: Skin is warm and dry.      Capillary Refill: Capillary refill takes less than 2 seconds.   Neurological:      Mental Status: He is alert. He is disoriented.      Cranial Nerves: Cranial nerve deficit present.      Sensory: Sensory deficit  present.      Motor: Weakness present.       Neurological Exam:   LOC: alert  Attention Span: Good   Language: Expressive aphasia  Articulation: Mute/Anarthric  Orientation: Untestable due to severe aphasia   Visual Fields: Hemianopsia right  EOM (CN III, IV, VI): Gaze preference  left  Pupils (CN II, III): PERRL  Facial Movement (CN VII): Lower facial weakness on the Right  Motor: Arm left  Normal 5/5  Leg left  Normal 5/5  Arm right  Plegia 0/5  Leg right Paresis: 2/5  Sensation: Alex-anesthesia right      Laboratory:  CMP: No results for input(s): GLUCOSE, CALCIUM, ALBUMIN, PROT, NA, K, CO2, CL, BUN, CREATININE, ALKPHOS, ALT, AST, BILITOT in the last 24 hours.  CBC:   Recent Labs   Lab 03/26/23  0143   WBC 5.84   RBC 4.31*   HGB 12.8*   HCT 37.5*      MCV 87   MCH 29.7   MCHC 34.1     Lipid Panel: No results for input(s): CHOL, LDLCALC, HDL, TRIG in the last 168 hours.  Coagulation: No results for input(s): PT, INR, APTT in the last 168 hours.  Hgb A1C: No results for input(s): HGBA1C in the last 168 hours.  TSH: No results for input(s): TSH in the last 168 hours.    Diagnostic Results:      Brain imaging/Vessel Imaging:  MRI Brain pending    CTA Stroke MP 3/26/2023 Occlusion of the L M1 branch of the MCA.    Cardiac Evaluation:   TTE pending        Barby Espinal, JAMAL, NP  [facility]  Department of Vascular Neurology   [facility] - Neuro Critical Care     Involved in 1  hours of critical care time and in-person contact during the following time: 0122 until 0222.  This patient has a critical neurological condition/illness, with high morbidity and mortality.  There is high probability for acute neurological change leading to clinical and possibly life-threatening deterioration requiring highest level of coordination and evaluation for urgent intervention.  Continuous in-person monitoring of the patient was done, including vital signs, neurological status, NIHSS or ICP monitoring with ongoing  treatment changes based on patient need.  Care was coordinated with other physicians/APPs involved in the patient's care.  Radiologic studies and laboratory data were reviewed and interpreted, and plan of care was re-assessed based on the results.  Diagnosis, treatment options and prognosis were discussed with the patient and/or family members or caregiver.

## 2023-03-26 NOTE — PT/OT/SLP EVAL
Occupational Therapy   Co-Evaluation and Discharge Note    Name: Peter Munoz  MRN: [id number]  Admitting Diagnosis: Acute cerebrovascular accident (CVA) due to embolism of left middle cerebral artery  Recent Surgery: Procedure(s) (LRB):  ANGIOGRAM-CEREBRAL (N/A) Day of Surgery    Recommendations:     Discharge Recommendations: home  Discharge Equipment Recommendations: none  Barriers to discharge:  None    Assessment:     Peter Munoz is a 67 y.o. male with a medical diagnosis of Acute cerebrovascular accident (CVA) due to embolism of left middle cerebral artery. At this time, patient is functioning at their prior level of function and does not require further acute OT services.     Plan:     During this hospitalization, patient does not require further acute OT services.  Please re-consult if situation changes.    Plan of Care Reviewed with: patient, spouse, family    Subjective     Chief Complaint: no c/o pain  Patient/Family Comments/goals: Return home    Occupational Profile:  Living Environment: pt lives with wife in 2SH with 0 LEO and has full bed/bath access on 1st floor with a WIS.  Previous level of function: independent with ADLs & mobility  Roles and Routines: works as a    Equipment Used at home: none  Assistance upon Discharge: family    Pain/Comfort:  Pain Rating 1: 0/10  Pain Rating Post-Intervention 1: 0/10    Patients cultural, spiritual, Buddhism conflicts given the current situation: no    Objective:     Communicated with: nurse prior to session.  Patient found supine with telemetry, blood pressure cuff, pulse ox (continuous), oxygen upon OT entry to room.    General Precautions: Standard, fall  Orthopedic Precautions: N/A  Braces: N/A  Respiratory Status: Nasal cannula, flow 2 L/min     Bed Mobility:    Patient completed Supine to Sit with stand by assistance    Functional Mobility/Transfers:  Patient completed Sit <> Stand Transfer with contact guard assistance   with  no assistive device   Patient completed Bed <> Chair Transfer using Step Transfer technique with contact guard assistance with no assistive device  Functional Mobility: Pt engaged in functional mobility to simulate household/community distances with CGA progressing to SBA x 1 w/ no AD  in order to maximize functional activity tolerance required for engagement in occupations of choice.    Activities of Daily Living:  Grooming: supervision to brush hair and wash face seated EOB  Upper Body Dressing: minimum assistance to cesar second gown posteriorly while seated EOB  Lower Body Dressing: supervision to cesar b/l socks seated EOB    Cognitive/Visual Perceptual:  Cognitive/Psychosocial Skills:     -       Oriented to: Person, Place, Time, and Situation   -       Follows Commands/attention:Follows multistep  commands  -       Communication: clear/fluent  -       Memory: No Deficits noted  -       Safety awareness/insight to disability: intact   Visual/Perceptual:      -Intact      Physical Exam:  Sensation:    -       Intact  Dominant hand:    -       R hand  Upper Extremity Range of Motion:     -       Right Upper Extremity: WFL  -       Left Upper Extremity: WFL  Upper Extremity Strength:    -       Right Upper Extremity: WFL  -       Left Upper Extremity: WFL   Strength:    -       Right Upper Extremity: WFL  -       Left Upper Extremity: WFL  Fine Motor Coordination:    -       Intact  Gross motor coordination:   WFL    AMPAC 6 Click ADL:  AMPAC Total Score: 20    Treatment & Education:  Pt educated on role of OT, POC, and goals for therapy.    POC was dicussed with patient/caregiver, who was included in its development and is in agreement with the identified goals and treatment plan.   Patient and family aware of patient's deficits and therapy progression.   Time provided for therapeutic counseling and discussion of health disposition.   Educated on importance of EOB/OOB mobility, maintaining routine,  sitting up in chair, and maximizing independence with ADLs during admission   Pt completed ADLs and functional mobility for treatment session as noted above   Pt/caregiver verbalized understanding and expressed no further concerns/questions.      Patient left up in chair with all lines intact and call button in reach    GOALS:   Multidisciplinary Problems       Occupational Therapy Goals          Problem: Occupational Therapy    Goal Priority Disciplines Outcome Interventions   Occupational Therapy Goal     OT, PT/OT Ongoing, Progressing    Description: Goals to be met by: 4/9/23     Patient will increase functional independence with ADLs by performing:    UE Dressing with [place].  LE Dressing with [place].  Grooming while standing with [place].  Toileting from toilet with [place] for hygiene and clothing management.   Bathing from  shower chair/bench with Supervision.  Toilet transfer to toilet with [place].                         History:     Past Medical History:   Diagnosis Date    Cancer 2022    prostate    HLD (hyperlipidemia)     Hypertension        History reviewed. No pertinent surgical history.    Time Tracking:     OT Date of Treatment:    OT Start Time: 0907  OT Stop Time: 0929  OT Total Time (min): 22 min    Billable Minutes:Evaluation 10  Neuromuscular Re-education 12    3/26/2023

## 2023-03-26 NOTE — ANESTHESIA PROCEDURE NOTES
Intubation    Date/Time: 3/26/2023 2:47 AM  Performed by: Emely Deras CRNA  Authorized by: Brandon Dominguez MD     Intubation:     Induction:  Intravenous    Intubated:  Postinduction    Mask Ventilation:  Easy mask    Attempts:  2    Attempted By:  CRNA    Method of Intubation:  Direct    Blade:  Park 2    Laryngeal View Grade: Grade IIb - only the arytenoids and epiglottis seen      Attempted By (2nd Attempt):  CRNA    Method of Intubation (2nd Attempt):  Direct    Blade (2nd Attempt):  Palmer 3    Laryngeal View Grade (2nd Attempt): Grade I - full view of cords      Difficult Airway Encountered?: No      Complications:  None (easy with Palmer #3. neck tissue diff. to manipulate)    Airway Device:  Oral endotracheal tube    Airway Device Size:  7.5    Style/Cuff Inflation:  Cuffed    Inflation Amount (mL):  5    Tube secured:  22    Secured at:  The lips    Placement Verified By:  Capnometry and Revisualization with laryngoscopy    Complicating Factors:  None    Findings Post-Intubation:  BS equal bilateral and atraumatic/condition of teeth unchanged

## 2023-03-26 NOTE — ASSESSMENT & PLAN NOTE
-s/p TNK in the ED.  Admitted to [facility] for close post thrombolytic monitoring.

## 2023-03-26 NOTE — PLAN OF CARE
Procedure complete. Pt tolerated well. VSS. Site CDI. Pt transferred to [facility] and report to be given bedside.

## 2023-03-26 NOTE — H&P
Inpatient Radiology Pre-procedure Note    History of Present Illness:  Peter Munoz is a 67 y.o. male with left MCA stroke symptoms. CTA left M1 occlusion. Good ASPECTS. NIHSS 25 on admission. S/p TNK.     Admission H&P reviewed.    Patient will undergo cerebral angiogram +/- aspiration thrombectomy     Past Medical History:   Diagnosis Date    Cancer 2022    prostate    HLD (hyperlipidemia)     Hypertension      History reviewed. No pertinent surgical history.    Review of Systems:   As documented in primary team H&P    Home Meds:   Prior to Admission medications    Medication Sig Start Date End Date Taking? Authorizing Provider   buPROPion (WELLBUTRIN XL) 150 MG TB24 tablet Take 150 mg by mouth once daily.    Historical Provider     Scheduled Meds:    atorvastatin  40 mg Oral Daily    senna-docusate 8.6-50 mg  1 tablet Oral BID    sodium chloride 0.9%  10 mL Intravenous Once    tenecteplase         Continuous Infusions:   PRN Meds:sodium chloride 0.9%  Anticoagulants/Antiplatelets: no anticoagulation    Allergies: Review of patient's allergies indicates:  No Known Allergies  Sedation Hx: have not been any systemic reactions    Labs:  No results for input(s): INR in the last 168 hours.    Invalid input(s):  PT,  PTT    Recent Labs   Lab 03/26/23 0143   WBC 5.84   HGB 12.8*   HCT 37.5*   MCV 87       No results for input(s): GLU, NA, K, CL, CO2, BUN, CREATININE, CALCIUM, MG, ALT, AST, ALBUMIN, BILITOT, BILIDIR in the last 168 hours.      Vitals:  Pulse: 69 (03/26/23 0204)  Resp: (!) 37 (03/26/23 0204)  BP: (!) 165/82 (03/26/23 0204)  SpO2: 96 % (03/26/23 0204)     Physical Exam:  ASA: 4  Mallampati: 2    General: no acute distress  Mental Status: aphasia   HEENT: normocephalic, atraumatic  Chest: unlabored breathing  Heart: regular heart rate  Abdomen: nondistended  Extremity: right hemiparesis       Plan:  Sedation Plan:   general anesthesia  Patient will undergo cerebral angiogram +/- aspiration  thrombectomy         Misty Malone MD     Neuro Endovascular Surgery Fellow   [facility]

## 2023-03-26 NOTE — ED TRIAGE NOTES
Peter Munoz, a 67 y.o. male presents to the ED w/ complaint of right sided flaccidity. Last known well 2230 on 3/25. PTA wife noticed him flailing around in bed and found that he couldn't talk or move his right side. No stroke hx, hx hypertension    Triage note:  Chief Complaint   Patient presents with    Cerebrovascular Accident     Aphasia, R sided paralysis, R facial droop. N 2230. 0015 onset symptoms. No trauma, no blood thinners.      Review of patient's allergies indicates:  No Known Allergies  History reviewed. No pertinent past medical history.

## 2023-03-26 NOTE — NURSING
Pt's heart rate dropped to 41 bpm,c/o nausea.Alert 110/60.ALEENA Mcclain @ bedside.Stat 12 leads,troponin ,bmp ordered.Family provided support.

## 2023-03-26 NOTE — SUBJECTIVE & OBJECTIVE
Past Medical History:   Diagnosis Date    Cancer 2022    prostate    HLD (hyperlipidemia)     Hypertension      History reviewed. No pertinent surgical history.   No current facility-administered medications on file prior to encounter.     Current Outpatient Medications on File Prior to Encounter   Medication Sig Dispense Refill    buPROPion (WELLBUTRIN XL) 150 MG TB24 tablet Take 150 mg by mouth once daily.        Allergies: Patient has no known allergies.    Family History   Problem Relation Age of Onset    Hypertension Brother      Social History     Tobacco Use    Smoking status: Never   Substance Use Topics    Alcohol use: Yes     Review of Systems   Unable to perform ROS: Other  Acute Aphasia  Objective:     Vitals:    Pulse: 69  BP: (!) 165/82  Resp: (!) 37  SpO2: 96 %    Pulse  Min: 69  Max: 69  BP  Min: 150/83  Max: 165/82  Resp  Min: 16  Max: 37  SpO2  Min: 95 %  Max: 96 %    No intake/output data recorded.           Physical Exam  Constitutionally: Patient resting comfortably. No acute distress.   HEENT: normocephalic, atraumatic. Extraocular movement intact. Anicteric, no conjunctival injection.  Neck: No tracheal deviation. No gross lymphadenopathy.  CV: regular rate, extremities well perfused.  Pulm: No respiratory distress on room air. Equal chest rise bilaterally.  Abdomen: No appreciable distention or ascites.  MSK: No obvious deformities  Skin: No appreciable rashes or jaundice.      Neurologic:  [id number]  Does not answer questions  Unintelligible slurring   Follows some commands on L only   R facial droop  PERRL, tracks   Pupils 3 mm, brisk  5/5 on LUE, LLE  1/5 RUE, 2/5 in RLE  Unable to text ataxia 2/2 aphasia    Current NIH Stroke Score Values      Flowsheet Row Most Recent Value   Interval baseline   1a. Level of Consciousness 0-->Alert, keenly responsive   1b. LOC Questions 0-->Answers both questions correctly   1c. LOC Commands 2-->Performs neither task correctly   2. Best Gaze 1-->Partial  gaze palsy, gaze is abnormal in one or both eyes, but forced deviation or total gaze paresis is not present   3. Visual 2-->Complete hemianopia   4. Facial Palsy 1-->Minor paralysis (flattened nasolabial fold, asymmetry on smiling)   5a. Motor Arm, Left 0-->No drift, limb holds 90 (or 45) degrees for full 10 secs   5b. Motor Arm, Right 3-->No effort against gravity, limb falls   6a. Motor Leg, Left 0-->No drift, leg holds 30 degree position for full 5 secs   6b. Motor Leg, Right 3-->No effort against gravity, leg falls to bed immediately   7. Limb Ataxia 0-->Absent   8. Sensory 2-->Severe to total sensory loss, patient is not aware of being touched in the face, arm, and leg   9. Best Language 3-->Mute, global aphasia, no usable speech or auditory comprehension   10. Dysarthria 2-->Severe dysarthria, patients speech is so slurred as to be unintelligible in the absence of or out of proportion to any dysphasia, or is mute/anarthric   11. Extinction and Inattention (formerly Neglect) 2-->Profound [place]-inattention/extinction more than 1 modality   Total (NIH Stroke Scale) 21                Today I personally reviewed pertinent medications, lines/drains/airways, imaging, cardiology results, laboratory results, microbiology results, notably: CTA

## 2023-03-26 NOTE — CONSULTS
Inpatient consult to Physical Medicine Rehab  Consult performed by: Mona Vazquez NP  Consult ordered by: Carmen Rangel PA-C  Reason for consult: Assess rehab needs    Reviewed patient history and current admission.  Rehab team following.  Full consult to follow.    WILFREDO Henderson, FNP-C  Physical Medicine & Rehabilitation   03/26/2023

## 2023-03-26 NOTE — PLAN OF CARE
Pt arrived to IR 4 for Cerebral angiogram. Pt oriented to unit and staff. Plan of care reviewed with patient, patient verbalizes understanding. Comfort measures utilized. Pt safely transferred from stretcher to procedural table. Fall risk reviewed with patient, fall risk interventions maintained. Safety strap applied, positioner pillows utilized to minimize pressure points. Blankets applied. Pt prepped and draped utilizing standard sterile technique. Patient placed on continuous monitoring, as required by sedation policy. Timeouts completed utilizing standard universal time-out, per department and facility policy. RN to remain at bedside, continuous monitoring maintained. Pt resting comfortably. Denies pain/discomfort. Will continue to monitor. See flow sheets for monitoring, medication administration, and updates.

## 2023-03-26 NOTE — NURSING
Patient arrived to [facility] >> IR >> [facility]     Report received from:  IR    Type of stroke/diagnosis: L MCA    TNK given at 0154    Thrombectomy start and end time (if applicable)  End time: 0338    Current symptoms: slurred speech, disoriented, drowsy    Skin assessment done: Yes  Wounds noted: none    *If wounds noted, was Wound Care consulted? N/A    [place] Completed? pending    Patient Belongings on Admit: silver wedding band (given to wife Loren), black rubber bracelet    NCC notified: ALEENA Lomas

## 2023-03-26 NOTE — PLAN OF CARE
[facility] Care Plan    POC reviewed with Peter Munoz at 0600. Pt verbalized understanding. Questions and concerns addressed. Pt s/p thrombectomy (x2 passes TICI 3). Pt progressing toward goals. Will continue to monitor. See below and flowsheets for full assessment and VS info.     PRN hydralazine administered shortly after arrival to [facility] from IR for SBP >140.   Cardene momentarily started and then weaned off before changed of shift. <30 mins  NIHSS of 2 this AM.   Zofran given x1 for pt report of nausea       Is this a stroke patient? yes- Stroke booklet reviewed with patient and family, risk factors identified for patient and stroke booklet remains at bedside for ongoing education.     Neuro:  Jacqueline Coma Scale  Best Eye Response: 4-->(E4) spontaneous  Best Motor Response: 5-->(M5) localizes pain  Best Verbal Response: (S) 2-->(V2) incomprehensible speech  Jacqueline Coma Scale Score: 11  Assessment Qualifiers: patient not sedated/intubated        24hr      CV:   Rhythm: normal sinus rhythm  BP goals:   SBP < 140  MAP > 65    Resp:           Plan: N/A    GI/:              No intake or output data in the 24 hours ending 03/26/23 0650       Labs/Accuchecks:  Recent Labs   Lab 03/26/23  0143   WBC 5.84   RBC 4.31*   HGB 12.8*   HCT 37.5*         Recent Labs   Lab 03/26/23  0143      K 3.2*   CO2 21*      BUN 22   CREATININE 1.1   ALKPHOS 75   ALT 22   AST 42*   BILITOT 0.4      Recent Labs   Lab 03/26/23  0143   INR 1.1      Recent Labs   Lab 03/26/23  0444   CPK 60   CPKMB 1.0   TROPONINI 0.010   MB 1.7       Electrolytes: Electrolytes replaced  Accuchecks: Q6H    Gtts:   niCARdipine 2.5 mg/hr (03/26/23 0622)       LDA/Wounds:  Lines/Drains/Airways       Peripheral Intravenous Line  Duration                  Peripheral IV - Single Lumen 03/26/23 0100 18 G Left Antecubital <1 day         Peripheral IV - Single Lumen 03/26/23 20 G Right Hand <1 day                  Wounds: No   Wound care consulted:  No   Problem: Adjustment to Illness (Stroke, Ischemic/Transient Ischemic Attack)  Goal: Optimal Coping  Outcome: Ongoing, Progressing     Problem: Bowel Elimination Impaired (Stroke, Ischemic/Transient Ischemic Attack)  Goal: Effective Bowel Elimination  Outcome: Ongoing, Progressing     Problem: Cerebral Tissue Perfusion (Stroke, Ischemic/Transient Ischemic Attack)  Goal: Optimal Cerebral Tissue Perfusion  Outcome: Ongoing, Progressing     Problem: Cognitive Impairment (Stroke, Ischemic/Transient Ischemic Attack)  Goal: Optimal Cognitive Function  Outcome: Ongoing, Progressing     Problem: Communication Impairment (Stroke, Ischemic/Transient Ischemic Attack)  Goal: Improved Communication Skills  Outcome: Ongoing, Progressing     Problem: Functional Ability Impaired (Stroke, Ischemic/Transient Ischemic Attack)  Goal: Optimal Functional Ability  Outcome: Ongoing, Progressing     Problem: Respiratory Compromise (Stroke, Ischemic/Transient Ischemic Attack)  Goal: Effective Oxygenation and Ventilation  Outcome: Ongoing, Progressing     Problem: Sensorimotor Impairment (Stroke, Ischemic/Transient Ischemic Attack)  Goal: Improved Sensorimotor Function  Outcome: Ongoing, Progressing     Problem: Swallowing Impairment (Stroke, Ischemic/Transient Ischemic Attack)  Goal: Optimal Eating and Swallowing without Aspiration  Outcome: Ongoing, Progressing     Problem: Urinary Elimination Impaired (Stroke, Ischemic/Transient Ischemic Attack)  Goal: Effective Urinary Elimination  Outcome: Ongoing, Progressing     Problem: Adult Inpatient Plan of Care  Goal: Plan of Care Review  Outcome: Ongoing, Progressing  Goal: Patient-Specific Goal (Individualized)  Outcome: Ongoing, Progressing  Goal: Absence of Hospital-Acquired Illness or Injury  Outcome: Ongoing, Progressing  Goal: Optimal Comfort and Wellbeing  Outcome: Ongoing, Progressing  Goal: Readiness for Transition of Care  Outcome: Ongoing, Progressing

## 2023-03-26 NOTE — ASSESSMENT & PLAN NOTE
-Small area of cytotoxic cerebral edema identified when reviewing brain imaging in the territory of the L middle cerebral artery. There is no mass effect associated with it. We will continue to monitor the patients clinical exam with frequently while in neuro critical care, for any worsening of symptoms which may indicate expansion of the insult and/or area of the edema resulting in clinical change that may require acute intervention to prevent loss of function and/or death. The pattern is suggestive of embolic etiology.

## 2023-03-26 NOTE — ASSESSMENT & PLAN NOTE
66 y/o M with PMHx of HTN, HLD, prostate cancer, and HCOM presents with acute onset of aphasia, L gaze preference, and R-sided neglect. LKW 2230. TNK end time approximately 0145. He is pending IR for thrombectomy.   -Admit to NCC, stroke following  -q1h neuro checks, vital checks  -EKG, ECHO, CXR  -A1c, TSH, lipid panel, coag panel  -Daily CBC, CMP, mag, phos  -SBP < 180 post TNK, Goal SBP < 140 pending successful thrombectomy   -Statin  -SCDs, Hold ACAP in acute post-TNK period  -CT without acute hemorrhage  -CTA with L M1 occlusion  -PT/OT/SLP as appropriate  -MRI pending

## 2023-03-26 NOTE — PLAN OF CARE
Problem: Physical Therapy  Goal: Physical Therapy Goal  Description: Goals to be met by: 2023     Patient will increase functional independence with mobility by performin. Supine to sit with independence  2. Sit to stand transfer with Modified [place]  3. Gait  x 200 feet with Modified [place] using No Assistive Device.   4. Ascend/descend 5 stair with bilateral Handrails Supervision using No Assistive Device.     Outcome: Ongoing, Progressing     Pt evaluated and appropriate goals established.

## 2023-03-26 NOTE — PLAN OF CARE
Problem: Occupational Therapy  Goal: Occupational Therapy Goal  Description: Goals to be met by: 4/9/23     Patient will increase functional independence with ADLs by performing:    UE Dressing with [place].  LE Dressing with [place].  Grooming while standing with [place].  Toileting from toilet with [place] for hygiene and clothing management.   Bathing from  shower chair/bench with Supervision.  Toilet transfer to toilet with [place].    Outcome: Ongoing, Progressing

## 2023-03-26 NOTE — PT/OT/SLP EVAL
Physical Therapy Co-Evaluation and Treatment     Patient Name:  Peter Munoz   MRN:  [id number]    *co-treatment with OT  2/2 pt with potential impaired ability to tolerate 2 evaluations 2/2 medical status   Recommendations:     Discharge Recommendations: home   Discharge Equipment Recommendations: none   Barriers to discharge: None    Assessment:     Peter Munoz is a 67 y.o. male admitted with a medical diagnosis of Acute cerebrovascular accident (CVA) due to embolism of left middle cerebral artery.  He presents with the following impairments/functional limitations: weakness, impaired endurance, impaired self care skills, impaired functional mobility, gait instability, impaired balance. Pt presented with significant improvement in symptoms compared to reports on admission. Pt with great strength, no asymmetrical deficits, no signs of aphasia. He was able to mobilize and walk around room with contact guard assistance progressing to stand by assistance.. Pt would continue to benefit from acute skilled therapy intervention to address deficits and progress toward prior level of function.       Rehab Prognosis: Good; patient would benefit from acute skilled PT services to address these deficits and reach maximum level of function.    Recent Surgery: Procedure(s) (LRB):  ANGIOGRAM-CEREBRAL (N/A) Day of Surgery    Plan:     During this hospitalization, patient to be seen 3 x/week to address the identified rehab impairments via gait training, therapeutic activities, therapeutic exercises, neuromuscular re-education and progress toward the following goals:    Plan of Care Expires:  04/26/23    Subjective     Chief Complaint: no complaints   Patient/Family Comments/goals: to get better   Pain/Comfort:  Pain Rating 1: 0/10  Pain Rating Post-Intervention 1: 0/10    Patients cultural, spiritual, Anglican conflicts given the current situation: no    Living Environment:   pt lives with wife in 2SH with 0 LEO and has full  bed/bath access on 1st floor with a WIS.  Previous level of function: independent with ADLs & mobility  Roles and Routines: works as a    Equipment Used at home: none  Assistance upon Discharge: family    Objective:     Communicated with RN prior to session.  Patient found HOB elevated with pulse ox (continuous), telemetry, blood pressure cuff, oxygen  upon PT entry to room.    General Precautions: Standard, fall  Orthopedic Precautions:N/A   Braces: N/A  Respiratory Status: Nasal cannula, flow 2 L/min    Exams:  Cognitive Exam:  Patient is AAOx4, followed all commands, communicates clearly and fluently  Gross Motor Coordination:  WFL  RLE ROM: WFL  RLE Strength: grossly 5/5  LLE ROM: WFL  LLE Strength: grossly 5/5    Functional Mobility:  Bed Mobility:     Supine to Sit: minimum assistance with HHA   Transfers:     Sit to Stand:  contact guard assistance with no AD  Gait: PT ambulated 20 ft with no AD and contact guard assistance, then ambulated 30 ft with no AD and stand by assistance. Pt demo'd adequate step size, good gait speed, no LOB, no SOB, no dizziness.       AM-PAC 6 CLICK MOBILITY  Total Score:18       Treatment & Education:  Pt educated on role of PT/POC. Pt verbalized understanding.   Pt encouraged to only perform OOB mobility with assistance from nursing/therapy. Pt agreeable.   Pt encouraged to ambulate daily with assistance/supervision from nursing/therapy. Pt agreeable.      Patient left up in chair with all lines intact, call button in reach, and RN notified.    GOALS:   Multidisciplinary Problems       Physical Therapy Goals          Problem: Physical Therapy    Goal Priority Disciplines Outcome Goal Variances Interventions   Physical Therapy Goal     PT, PT/OT Ongoing, Progressing     Description: Goals to be met by: 2023     Patient will increase functional independence with mobility by performin. Supine to sit with independence  2. Sit to stand transfer  with Modified [place]  3. Gait  x 200 feet with Modified [place] using No Assistive Device.   4. Ascend/descend 5 stair with bilateral Handrails Supervision using No Assistive Device.                          History:     Past Medical History:   Diagnosis Date    Cancer 2022    prostate    HLD (hyperlipidemia)     Hypertension        History reviewed. No pertinent surgical history.    Time Tracking:     PT Received On: 03/26/23  PT Start Time: 0907     PT Stop Time: 0930  PT Total Time (min): 23 min     Billable Minutes: Evaluation 10 mins and Gait Training 13 mins      03/26/2023

## 2023-03-26 NOTE — TRANSFER OF CARE
Anesthesia Transfer of Care Note    Patient: Peter Munoz    Procedure(s) Performed: Procedure(s) (LRB):  ANGIOGRAM-CEREBRAL (N/A)    Patient location: ICU    Anesthesia Type: general    Transport from OR: Transported from OR on 6-10 L/min O2 by face mask with adequate spontaneous ventilation    Post pain: adequate analgesia    Post assessment: no apparent anesthetic complications and tolerated procedure well    Post vital signs: stable    Level of consciousness: lethargic    Complications: none    Transfer of care protocol was followedComments: Upon extubation able to respond to verbal commands, move right arm, and verbalize name.      Last vitals:   Visit Vitals  BP (!) 157/96   Pulse 69   Resp (!) 37   Wt 89 kg (196 lb 3.4 oz)   SpO2 96%   BMI 30.73 kg/m²

## 2023-03-26 NOTE — PT/OT/SLP EVAL
"Speech Language Pathology Evaluation  Bedside Swallow    Patient Name:  Peter Munoz   MRN:  [id number]  Admitting Diagnosis: Acute cerebrovascular accident (CVA) due to embolism of left middle cerebral artery    Recommendations:                 General Recommendations:  Cognitive-linguistic evaluation  Diet recommendations:  Regular, Thin   Aspiration Precautions: 1 bite/sip at a time, HOB to 90 degrees, No straws, Small bites/sips, and Strict aspiration precautions   General Precautions: Standard, fall    History:     Past Medical History:   Diagnosis Date    Cancer 2022    prostate    HLD (hyperlipidemia)     Hypertension        History reviewed. No pertinent surgical history.    Chest X-Rays: 3/26 Significant improvement in appearance of the pulmonary parenchyma which is normal on today's exam.    Prior diet: regular    Subjective   Asleep upon SLP entry, required verbal stim to maintain alertness for eval. Seated up in chair. Pt's adult children at bedside.   "No I don't want that" referring to cracker initially    Pain/Comfort:  Pain Rating 1: 0/10  Pain Rating Post-Intervention 2: 0/10    Respiratory Status: Room air    Objective:     Oral Musculature Evaluation  Oral Musculature: WFL  Dentition: present and adequate  Oral Labial Strength and Mobility: WFL  Lingual Strength and Mobility: WFL  Volitional Cough: adequate  Volitional Swallow: adequate  Voice Prior to PO Intake: adequate    Bedside Swallow Eval:   Consistencies Assessed:  Thin liquids cup sips of thin x5, ice chip x1  Solids 1 naida cracker      Oral Phase:   Slow oral transit time    Pharyngeal Phase:   no overt clinical signs/symptoms of aspiration    SLP educated pt & family on recs, precautions, risks & s/s aspiration, role of SLP, POC, etc. Family verbalized understanding. Pt may require reinforcement to follow.     Assessment:     Peter Munoz is a 67 y.o. male with an SLP diagnosis of oropharyngeal swallow appears WFL. ST service will " follow up to check swallow & to complete SLP Speech/Language/Cognitive Evaluation      Goals:   Multidisciplinary Problems       SLP Goals          Problem: SLP    Goal Priority Disciplines Outcome   SLP Goal     SLP Ongoing, Progressing   Description: Speech Language Pathology Goals  Goals expected to be met by 4/2  1. Pt will tolerate a regular diet & thin liquids without s/s aspiration & adequate oral phase of swallow  2. SLP Speech/Language/Cognitive Evaluation                         Plan:     Patient to be seen:  4 x/week   Plan of Care expires:  04/24/23  Plan of Care reviewed with:  patient, daughter, son   SLP Follow-Up:  Yes       Discharge recommendations:   (tbd)     Time Tracking:     SLP Treatment Date:   03/26/23  Speech Start Time:  1024  Speech Stop Time:  1040     Speech Total Time (min):  16 min    Billable Minutes: Eval Swallow and Oral Function 8 and Self Care/Home Management Training 8    03/26/2023

## 2023-03-26 NOTE — ANESTHESIA PREPROCEDURE EVALUATION
[facility]  Anesthesia Pre-Operative Evaluation        Patient Name: Peter Munoz  YOB: 1955  MRN: [id number]    SUBJECTIVE:     Pre-operative Evaluation for Procedure(s) (LRB):  ANGIOGRAM-CEREBRAL (N/A)     03/26/2023    Peter Munoz is a 67 y.o. male with a PMHx significant for HLD, HTN, prostate cancer s/p radical prostatectomy, and hypertrophic cardiomyopathy (unknown EF) presenting to the ED with right-sided weakness and aphasia. He was found to have an acute left M1 occlusion on imaging. Patient will be taken to OR for class A cerebral angiogram with Interventional Radiology.     Per patient's wife, he has been NPO since approx 9 PM when they had dinner at a wedding they were attending.       Previous Airway: None documented.    Patient Active Problem List   Diagnosis    Hypertrophic cardiomyopathy    Hypertension    Hyperlipidemia       Review of patient's allergies indicates:  No Known Allergies    Current Outpatient Medications   Medication Instructions    buPROPion (WELLBUTRIN XL) 150 mg, Daily       No past surgical history on file.    Social History     Substance and Sexual Activity   Drug Use Not on file     Alcohol Use: Not on file     Tobacco Use: Medium Risk    Smoking Tobacco Use: Former    Smokeless Tobacco Use: Unknown    Passive Exposure: Not on file       OBJECTIVE:     Vital Signs Range (Last 24H):  Pulse:  [69]   Resp:  [16-37]   BP: (150-165)/(82-83)   SpO2:  [95 %-96 %]       Significant Labs    Heme Profile  Lab Results   Component Value Date    WBC 5.84 03/26/2023    HGB 12.8 (L) 03/26/2023    HCT 37.5 (L) 03/26/2023     03/26/2023       Coagulation Studies  No results found for: LABPROT, INR, APTT    BMP  No results found for: NA, K, CL, CO2, BUN, CREATININE, MG, PHOS, CAION    Liver Function Tests  No results found for: AST, ALT, ALKPHOS, BILITOT, PROT, ALBUMIN    Lipid Profile  No results found for: CHOL, HDL, LDLDIRECT,  TRIG    Endocrine Profile  No results found for: HGBA1C, TSH      Cardiac Studies    EKG:   No results found for this or any previous visit.    TTE:  No results found for this or any previous visit.      ASSESSMENT/PLAN:      03/26/2023  Peter Munoz is a 67 y.o., male.      Pre-op Assessment    I have reviewed the Patient Summary Reports.     I have reviewed the Nursing Notes. I have reviewed the NPO Status.   I have reviewed the Medications.     Review of Systems  Anesthesia Hx:  No problems with previous Anesthesia  Denies Family Hx of Anesthesia complications.   Denies Personal Hx of Anesthesia complications.   Hematology/Oncology:         -- Cancer in past history: Other (see Oncology comments) Oncology Comments: H/o prostate cancer s/p radical prostatectomy      Cardiovascular:   Hypertension CHF hyperlipidemia    Hepatic/GI:  Hepatic/GI Normal    Neurological:  Neurology Normal    Endocrine:  Endocrine Normal    Psych:  Psychiatric Normal           Physical Exam  General: Confusion and Alert  PERRLA  Aphasic     Airway:  Mallampati: IV   Mouth Opening: Small, but > 3cm  TM Distance: Normal  Tongue: Normal  Neck ROM: Normal ROM    Dental:  Intact        Anesthesia Plan  Type of Anesthesia, risks & benefits discussed:    Anesthesia Type: Gen ETT  Intra-op Monitoring Plan: Standard ASA Monitors and Art Line  Post Op Pain Control Plan: multimodal analgesia and IV/PO Opioids PRN  Induction:  IV  Airway Plan: Direct, Post-Induction  Informed Consent: Informed consent signed with the Patient representative and all parties understand the risks and agree with anesthesia plan.  All questions answered.   ASA Score: 4 Emergent  Day of Surgery Review of History & Physical: H&P Update referred to the surgeon/provider.    Ready For Surgery From Anesthesia Perspective.     .

## 2023-03-26 NOTE — PROCEDURES
Radiology Post-Procedure Note    Pre Op Diagnosis: Left MCA Stroke    Post Op Diagnosis: same    Procedure: Cerebral angiogram and aspiration thrombectomy    Procedure performed by: Juan Burgess MD; Neuro IR Fellow: Misty Malone MD    Written Informed Consent Obtained: Yes    Specimen Removed: NO    Estimated Blood Loss: less than 50     Procedure report:     An 8F sheath was placed into the right femoral artery and a 5F Ilir catheter was advanced into the aortic arch.  The left CCA and ICA were subselected and angiography of the brain was performed after injection into each of these vessels.    Preliminary interpretation: Left M1 occlusion. Aspiration thrombectomy performed with restoration of TICI 3 flow after 2 passes. Ken Balloon Guide, 6 Fr Gail and 4 Max aspiration catheters were used.  Please see Imaging report for full details.    A right femoral artery angiogram was performed, the sheath removed and hemostasis achieved using perclose.  No hematoma was present at the time of hemostasis.    The patient tolerated the procedure well.     Juan Burgess MD  Department of Radiology

## 2023-03-26 NOTE — PROGRESS NOTES
Post thrombectomy for left M1 occlusion  Called to bedside for acute onset bradycardia with relative drop in bld pressure. Associated with transient confusion.   12 lead EKG unchanged t-wave inversions  CXR- pulmonary congestion    Cardiac enzymes  Echo pending  MRI pending  SBP goal 100-140mmhg  Swallow eval  Lipitor   Hold antithrombotics for 24hrs post thrombolytics      Uninterrupted Critical Care/Counseling Time (not including procedures):: 32 mins    Earl Reagan MD, FNCS  Neurocritical care attending

## 2023-03-26 NOTE — PLAN OF CARE
Problem: SLP  Goal: SLP Goal  Description: Speech Language Pathology Goals  Goals expected to be met by 4/2  1. Pt will tolerate a regular diet & thin liquids without s/s aspiration & adequate oral phase of swallow  2. SLP Speech/Language/Cognitive Evaluation    Outcome: Ongoing, Progressing    SLP Clinical Swallow Evaluation completed. See note for details.

## 2023-03-26 NOTE — HPI
Peter Munoz is a 67 y.o. male with a significant medical history of HTN, HLD, prostate CA (2022) who presents to the hospital for evaluation of aphasia and RSW.  HPI information gathered from review of the patient's medical record, patient's wife due to the patient having severe aphasia.  The patient was LKN around 2230.  Around 0030 the patient's wife heard a noise and woke up and noted the patient to have aphasia and right-sided weakness that continued to progress as she activated EMS.  On EMS arrival the patient was noted to have a right facial droop and right hemiplegia in addition to aphasia.  A stroke code was activated in the field and the patient was brought to the ED.  On arrival to the ED the patient is awake.  He has global aphasia, right facial droop, left gaze preference, left hemianopsia.  VAN+.  NIHSS 24.  He was taken to CT for a CTA Stroke MP.  Imaging reviewed by Dr. Sarkar.  CTH negative, CTA positive for L M1 occlusion.  Discussed the findings with the patient and his wife.  Explained the risks and benefits of thrombolytic therapy with them.  The patient's wife agreed to thrombolytic therapy.  Assessment post thrombolytic therapy mildly improved.  The patient's gaze preference resolved and he had some vocalizations though incomprehensible.  He remained w/RSW, facial droop, and expressive/receptive aphasia.  He was taken to IR for endovascular intervention.

## 2023-03-26 NOTE — ED PROVIDER NOTES
Source of History:  EMS  Chart    Chief complaint:  Cerebrovascular Accident (Aphasia, R sided paralysis, R facial droop. N 2230. 0015 onset symptoms. No trauma, no blood thinners. )      HPI:  Peter Munoz is a 67 y.o. male with history of hypertension, hyperlipidemia, hypertrophic cardiomyopathy presenting to emergency department by EMS as stroke activation.      Patient had sudden onset right side weakness and aphasia with last known normal at 22 30.  There was no trauma.    Review of patient's allergies indicates:  No Known Allergies    No current facility-administered medications on file prior to encounter.     Current Outpatient Medications on File Prior to Encounter   Medication Sig Dispense Refill    buPROPion (WELLBUTRIN XL) 150 MG TB24 tablet Take 150 mg by mouth once daily.         PMH:  As per HPI and below:  Past Medical History:   Diagnosis Date    Cancer 2022    prostate    HLD (hyperlipidemia)     Hypertension      History reviewed. No pertinent surgical history.    Social History     Socioeconomic History    Marital status:    Tobacco Use    Smoking status: Never   Substance and Sexual Activity    Alcohol use: Yes       Family History   Problem Relation Age of Onset    Hypertension Brother        Physical Exam:      Vitals:    03/26/23 0204   BP: (!) 165/82   Pulse: 69   Resp: (!) 37     Gen:  Ill-appearing  Mental Status:  Follows some commands on the left side  Skin: Warm, dry. No rashes seen.  ENT: PERRL.Oropharynx clear.    Pulm: Clear to auscultation bilaterally.  Good air movement.  No wheezes. No increased work of breathing.  CV: Regular rate. Regular rhythm. No lower extremity edema.  Neuro:  Aphasic, right-sided hemiparesis.  Initial NIHSS 25.    Laboratory Studies:  Labs Reviewed   CBC W/ AUTO DIFFERENTIAL - Abnormal; Notable for the following components:       Result Value    RBC 4.31 (*)     Hemoglobin 12.8 (*)     Hematocrit 37.5 (*)     All other components within normal  limits   COMPREHENSIVE METABOLIC PANEL - Abnormal; Notable for the following components:    Potassium 3.2 (*)     CO2 21 (*)     AST 42 (*)     All other components within normal limits   LIPID PANEL - Abnormal; Notable for the following components:    Triglycerides 561 (*)     HDL 26 (*)     HDL/Cholesterol Ratio 14.9 (*)     Total Cholesterol/HDL Ratio 6.7 (*)     All other components within normal limits   PROTIME-INR   HEMOGLOBIN A1C   TSH   URINALYSIS, REFLEX TO URINE CULTURE   MAGNESIUM   PHOSPHORUS   HIV 1 / 2 ANTIBODY   HEPATITIS C ANTIBODY   PSA, SCREENING   POCT GLUCOSE, HAND-HELD DEVICE   TYPE & SCREEN   ISTAT CREATININE   ISTAT LACTATE   POCT GLUCOSE MONITORING CONTINUOUS       EKG (independently interpreted by me):  Normal sinus rhythm, rate 70.  No STEMI.  QRS 94 milliseconds.   milliseconds.      Chart reviewed.     Imaging Results              IR Angiogram Carotid Cerebral Bilateral inc Arch (In process)                      CTA STROKE MULTI-PHASE (In process)                     Medications Given:  Medications   sodium chloride 0.9% flush 10 mL (has no administration in time range)   atorvastatin tablet 40 mg (has no administration in time range)   senna-docusate 8.6-50 mg per tablet 1 tablet (has no administration in time range)   iohexoL (OMNIPAQUE 350) injection 100 mL (100 mLs Intravenous Given 3/26/23 0128)   tenecteplase (TNKase) 50 mg IV KIT (  Override Pull 3/26/23 0145)   tenecteplase (TNKase) IV KIT 22.5 mg (22.5 mg Intravenous Given 3/26/23 0154)     Followed by   sodium chloride 0.9% flush 10 mL (10 mLs Intravenous Given 3/26/23 0154)       Discussed with:  Vascular Neurology    MDM:    67 y.o. male with acute onset aphasia and right-sided weakness.  Stroke activation from the field.  On arrival protecting airway.  Hemodynamically stable.    CTA with M1 occlusion, no intracranial hemorrhage.  Will receive TNK, then IR.     Critical Care Procedure Note    Authorized and Performed  by: Estephanie Mcallister    Total critical care time: Approximately 35 minutes    Due to a high probability of clinically significant, life threatening deterioration, the patient required my highest level of preparedness to intervene emergently and I personally spent this critical care time directly and personally managing the patient. This critical care time included obtaining a history; examining the patient; pulse oximetry; ordering and review of studies; arranging urgent treatment with development of a management plan; evaluation of patient's response to treatment; frequent reassessment; and, discussions with other providers.    This critical care time was performed to assess and manage the high probability of imminent, life-threatening deterioration that could result in multi-organ failure. It was exclusive of separately billable procedures and treating other patients and teaching time.      Diagnostic Impression:    1. Stroke    2. Ischemic stroke         ED Disposition Condition    Admit Stable             Family understands the plan and is in agreement, verbalized understanding, questions answered    Estephanie Mcallister MD  Emergency Medicine         Estephanie Mcallister MD  03/26/23 0254

## 2023-03-26 NOTE — H&P
[facility] - Emergency Dept  Neurocritical Care  History & Physical    Admit Date: 3/26/2023  Service Date: 03/26/2023  Length of Stay: 0    Subjective:     Chief Complaint: Acute cerebrovascular accident (CVA) due to embolism of left middle cerebral artery    History of Present Illness: Mr. Peter Munoz is a 66 y/o M with PMHx of HTN, HLD, prostate cancer, and HCOM who presents to [facility] with a L M1 occlusion. His LKW was 2230, when his wife heard him make a strange sound. He was unable to formulate proper speech or move his right side. He presented to [facility] ED with acute onset of aphasia, L gaze preference, and R-sided neglect. CT without acute hemorrhage. TNK end time approximately 0145. CTA with L M1 occlusion. He is pending IR for thrombectomy.     He will be admitted to [facility] for hourly neuro-monitoring and a higher level of care.       Past Medical History:   Diagnosis Date    Cancer 2022    prostate    HLD (hyperlipidemia)     Hypertension      History reviewed. No pertinent surgical history.   No current facility-administered medications on file prior to encounter.     Current Outpatient Medications on File Prior to Encounter   Medication Sig Dispense Refill    buPROPion (WELLBUTRIN XL) 150 MG TB24 tablet Take 150 mg by mouth once daily.        Allergies: Patient has no known allergies.    Family History   Problem Relation Age of Onset    Hypertension Brother      Social History     Tobacco Use    Smoking status: Never   Substance Use Topics    Alcohol use: Yes     Review of Systems   Unable to perform ROS: Other  Acute Aphasia  Objective:     Vitals:    Pulse: 69  BP: (!) 165/82  Resp: (!) 37  SpO2: 96 %    Pulse  Min: 69  Max: 69  BP  Min: 150/83  Max: 165/82  Resp  Min: 16  Max: 37  SpO2  Min: 95 %  Max: 96 %    No intake/output data recorded.           Physical Exam  Constitutionally: Patient resting comfortably. No acute distress.   HEENT: normocephalic, atraumatic. Extraocular movement intact. Anicteric, no  conjunctival injection.  Neck: No tracheal deviation. No gross lymphadenopathy.  CV: regular rate, extremities well perfused.  Pulm: No respiratory distress on room air. Equal chest rise bilaterally.  Abdomen: No appreciable distention or ascites.  MSK: No obvious deformities  Skin: No appreciable rashes or jaundice.      Neurologic:  [id number]  Does not answer questions  Unintelligible slurring   Follows some commands on L only   R facial droop  PERRL, tracks   Pupils 3 mm, brisk  5/5 on LUE, LLE  1/5 RUE, 2/5 in RLE  Unable to text ataxia 2/2 aphasia    Current NIH Stroke Score Values    Post TNK administration, Pre-IR  Flowsheet Row Most Recent Value   Interval baseline   1a. Level of Consciousness 0-->Alert, keenly responsive   1b. LOC Questions 0-->Answers both questions correctly   1c. LOC Commands 2-->Performs neither task correctly   2. Best Gaze 1-->Partial gaze palsy, gaze is abnormal in one or both eyes, but forced deviation or total gaze paresis is not present   3. Visual 2-->Complete hemianopia   4. Facial Palsy 1-->Minor paralysis (flattened nasolabial fold, asymmetry on smiling)   5a. Motor Arm, Left 0-->No drift, limb holds 90 (or 45) degrees for full 10 secs   5b. Motor Arm, Right 3-->No effort against gravity, limb falls   6a. Motor Leg, Left 0-->No drift, leg holds 30 degree position for full 5 secs   6b. Motor Leg, Right 3-->No effort against gravity, leg falls to bed immediately   7. Limb Ataxia 0-->Absent   8. Sensory 2-->Severe to total sensory loss, patient is not aware of being touched in the face, arm, and leg   9. Best Language 3-->Mute, global aphasia, no usable speech or auditory comprehension   10. Dysarthria 2-->Severe dysarthria, patients speech is so slurred as to be unintelligible in the absence of or out of proportion to any dysphasia, or is mute/anarthric   11. Extinction and Inattention (formerly Neglect) 2-->Profound salina-inattention/extinction more than 1 modality   Total (NIH  Stroke Scale) 21                Today I personally reviewed pertinent medications, lines/drains/airways, imaging, cardiology results, laboratory results, microbiology results, notably: CTA          Assessment/Plan:     Neuro  * Acute cerebrovascular accident (CVA) due to embolism of left middle cerebral artery  66 y/o M with PMHx of HTN, HLD, prostate cancer, and HCOM presents with acute onset of aphasia, L gaze preference, and R-sided neglect. LKW 2230. TNK end time approximately 0145. He is pending IR for thrombectomy.   -Admit to [facility]CC, stroke following  -q1h neuro checks, vital checks  -EKG, ECHO, CXR  -A1c, TSH, lipid panel, coag panel  -Daily CBC, CMP, mag, phos  -SBP < 180 post TNK, Goal SBP < 140 pending successful thrombectomy   -Statin  -SCDs, Hold ACAP in acute post-TNK period  -CT without acute hemorrhage  -CTA with L M1 occlusion  -PT/OT/SLP as appropriate  -MRI pending    Cytotoxic brain edema  See primary problem  Na goal: Eunatremic    Aphasia  SLP    Cardiac/Vascular  Hyperlipidemia  statin    Hypertension  SBP < 180 s/p TNK and prior to IR  SBP < 140 pending successful thrombectomy  Telmisartan 80 daily and Coreg 25 bid at home, hold in acute setting  PRN labetalol, hydralazine    Hypertrophic cardiomyopathy  Stroke risk factor  CXR, ECHO, EKG pending   Telmisartan 80 daily and Coreg 25 bid at home, hold in acute setting    Oncology  History of malignant neoplasm of prostate  Per wife, patient in remission  PSA pending    Prostate cancer-resolved as of 3/26/2023  Per wife, patient in remission  PSA pending           The patient is being Prophylaxed for:  Venous Thromboembolism with: Mechanical  Stress Ulcer with: None  Ventilator Pneumonia with: none    Activity Orders            Turn patient starting at 03/26 0400    Elevate HOB starting at 03/26 0209    Diet NPO: NPO starting at 03/26 0209          Full Code     Critical condition in that Patient has a condition that poses threat to life and bodily  function: L M1 occlusion s/p TNK, pending IR     45 minutes of Critical care time was spent personally by me on the following activities: development of treatment plan with patient or surrogate and bedside caregivers, discussions with consultants, evaluation of patient's response to treatment, examination of patient, ordering and performing treatments and interventions, ordering and review of laboratory studies, ordering and review of radiographic studies, pulse oximetry, antibiotic titration if applicable, vasopressor titration if applicable, re-evaluation of patient's condition. This critical care time did not overlap with that of any other provider or involve time for any procedures. There is high probability for acute neurological change leading to clinical and possibly life-threatening deterioration requiring highest level of physician preparedness for urgent intervention.    Carmen Rangel PA-C  Neurocritical Care  [facility] - Emergency Dept

## 2023-03-26 NOTE — HPI
Mr. Peter Munoz is a 68 y/o M with PMHx of HTN, HLD, prostate cancer, and HCOM who presents to [facility] with a L M1 occlusion. His LKW was 2230, when his wife heard him make a strange sound. He was unable to formulate proper speech or move his right side. He presented to [facility] ED with acute onset of aphasia, L gaze preference, and R-sided neglect. CT without acute hemorrhage. TNK end time approximately 0145. CTA with L M1 occlusion. He is pending IR for thrombectomy.     He will be admitted to [facility] for hourly neuro-monitoring and a higher level of care.

## 2023-03-26 NOTE — ASSESSMENT & PLAN NOTE
Peter Munoz is a 67 y.o. male with a significant medical history of HTN, HLD, prostate CA (2022) who presents to the hospital for evaluation of aphasia and RSW.   He was given TNK and taken to IR for endovascular intervention.    Antithrombotics for secondary stroke prevention: Antiplatelets: None: Hold all Antithrombotics x 24 hours after IV Thrombolytic therapy administration    Statins for secondary stroke prevention and hyperlipidemia, if present:   Statins: Atorvastatin- 40 mg daily    Aggressive risk factor modification: HTN, HLD     Rehab efforts: The patient has been evaluated by a stroke team provider and the therapy needs have been fully considered based off the presenting complaints and exam findings. The following therapy evaluations are needed: PT evaluate and treat, OT evaluate and treat, SLP evaluate and treat, PM&R evaluate for appropriate placement    Diagnostics ordered/pending: HgbA1C to assess blood glucose levels, Lipid Profile to assess cholesterol levels, MRI head without contrast to assess brain parenchyma, TTE to assess cardiac function/status , TSH to assess thyroid function    VTE prophylaxis: Mechanical prophylaxis: Place SCDs  None: Reason for No Pharmacological VTE Prophylaxis: Holding x 24 hours s/p treatment with Thrombolytic therapy    BP parameters: Infarct: Post sucessful thrombectomy, SBP <140

## 2023-03-26 NOTE — ASSESSMENT & PLAN NOTE
Stroke risk factor  CXR, ECHO, EKG pending   Telmisartan 80 daily and Coreg 25 bid at home, hold in acute setting

## 2023-03-26 NOTE — PLAN OF CARE
[facility] Care Plan    POC reviewed with Peter RUTH Munoz and family at 1400. Pt verbalized understanding. Questions and concerns addressed.No more episodes of low heart rate. No acute events today. Pt progressing toward goals. Will continue to monitor. See below and flowsheets for full assessment and VS info.             Is this a stroke patient? yes- Stroke booklet reviewed with patient and family, risk factors identified for patient and stroke booklet remains at bedside for ongoing education.     Neuro:  Reading Coma Scale  Best Eye Response: 4-->(E4) spontaneous  Best Motor Response: 6-->(M6) obeys commands  Best Verbal Response: 5-->(V5) oriented  Reading Coma Scale Score: 15  Assessment Qualifiers: patient not sedated/intubated        24 hr Temp:  [97.7 °F (36.5 °C)-98 °F (36.7 °C)]     CV:   Rhythm: sinus bradycardia  BP goals:   SBP < 140  MAP > 65    Resp:           Plan: N/A    GI/:     Diet/Nutrition Received: regular     Voiding Characteristics: due to void    Intake/Output Summary (Last 24 hours) at 3/26/2023 1453  Last data filed at 3/26/2023 1229  Gross per 24 hour   Intake 120 ml   Output 900 ml   Net -780 ml          Labs/Accuchecks:  Recent Labs   Lab 03/26/23 0143   WBC 5.84   RBC 4.31*   HGB 12.8*   HCT 37.5*         Recent Labs   Lab 03/26/23  0143 03/26/23  0828    133*   K 3.2* 4.9   CO2 21* 19*    104   BUN 22 20   CREATININE 1.1 1.0   ALKPHOS 75  --    ALT 22  --    AST 42*  --    BILITOT 0.4  --       Recent Labs   Lab 03/26/23  0143   INR 1.1      Recent Labs   Lab 03/26/23  0444 03/26/23  0828   CPK 60  --    CPKMB 1.0  --    TROPONINI 0.010 0.010   MB 1.7  --        Electrolytes: Electrolytes replaced  Accuchecks: Q6H    Gtts:   niCARdipine 2.5 mg/hr (03/26/23 0622)       LDA/Wounds:  Lines/Drains/Airways       Peripheral Intravenous Line  Duration                  Peripheral IV - Single Lumen 03/26/23 0100 18 G Left Antecubital <1 day         Peripheral IV - Single Lumen  03/26/23 20 G Right Hand <1 day                  Wounds: No  Wound care consulted: No

## 2023-03-27 PROBLEM — I42.2 APICAL VARIANT HYPERTROPHIC CARDIOMYOPATHY: Status: ACTIVE | Noted: 2023-03-27

## 2023-03-27 LAB
ALBUMIN SERPL BCP-MCNC: 3.7 G/DL (ref 3.5–5.2)
ALP SERPL-CCNC: 64 U/L (ref 55–135)
ALT SERPL W/O P-5'-P-CCNC: 20 U/L (ref 10–44)
ANION GAP SERPL CALC-SCNC: 9 MMOL/L (ref 8–16)
AST SERPL-CCNC: 12 U/L (ref 10–40)
BASOPHILS # BLD AUTO: 0.01 K/UL (ref 0–0.2)
BASOPHILS NFR BLD: 0.1 % (ref 0–1.9)
BILIRUB SERPL-MCNC: 0.6 MG/DL (ref 0.1–1)
BUN SERPL-MCNC: 21 MG/DL (ref 8–23)
CALCIUM SERPL-MCNC: 9.1 MG/DL (ref 8.7–10.5)
CHLORIDE SERPL-SCNC: 105 MMOL/L (ref 95–110)
CO2 SERPL-SCNC: 23 MMOL/L (ref 23–29)
CREAT SERPL-MCNC: 1.2 MG/DL (ref 0.5–1.4)
DIFFERENTIAL METHOD: ABNORMAL
EOSINOPHIL # BLD AUTO: 0 K/UL (ref 0–0.5)
EOSINOPHIL NFR BLD: 0 % (ref 0–8)
ERYTHROCYTE [DISTWIDTH] IN BLOOD BY AUTOMATED COUNT: 12.4 % (ref 11.5–14.5)
EST. GFR  (NO RACE VARIABLE): >60 ML/MIN/1.73 M^2
GLUCOSE SERPL-MCNC: 112 MG/DL (ref 70–110)
HCT VFR BLD AUTO: 35 % (ref 40–54)
HGB BLD-MCNC: 12.3 G/DL (ref 14–18)
IMM GRANULOCYTES # BLD AUTO: 0.05 K/UL (ref 0–0.04)
IMM GRANULOCYTES NFR BLD AUTO: 0.4 % (ref 0–0.5)
LYMPHOCYTES # BLD AUTO: 1 K/UL (ref 1–4.8)
LYMPHOCYTES NFR BLD: 8.4 % (ref 18–48)
MAGNESIUM SERPL-MCNC: 1.7 MG/DL (ref 1.6–2.6)
MCH RBC QN AUTO: 29.9 PG (ref 27–31)
MCHC RBC AUTO-ENTMCNC: 35.1 G/DL (ref 32–36)
MCV RBC AUTO: 85 FL (ref 82–98)
MONOCYTES # BLD AUTO: 0.8 K/UL (ref 0.3–1)
MONOCYTES NFR BLD: 6.9 % (ref 4–15)
NEUTROPHILS # BLD AUTO: 9.9 K/UL (ref 1.8–7.7)
NEUTROPHILS NFR BLD: 84.2 % (ref 38–73)
NRBC BLD-RTO: 0 /100 WBC
PHOSPHATE SERPL-MCNC: 3.4 MG/DL (ref 2.7–4.5)
PLATELET # BLD AUTO: 216 K/UL (ref 150–450)
PMV BLD AUTO: 10 FL (ref 9.2–12.9)
POTASSIUM SERPL-SCNC: 4.3 MMOL/L (ref 3.5–5.1)
PROT SERPL-MCNC: 5.8 G/DL (ref 6–8.4)
RBC # BLD AUTO: 4.12 M/UL (ref 4.6–6.2)
SODIUM SERPL-SCNC: 137 MMOL/L (ref 136–145)
WBC # BLD AUTO: 11.73 K/UL (ref 3.9–12.7)

## 2023-03-27 PROCEDURE — 99233 SBSQ HOSP IP/OBS HIGH 50: CPT | Mod: ,,, | Performed by: INTERNAL MEDICINE

## 2023-03-27 PROCEDURE — 25000003 PHARM REV CODE 250

## 2023-03-27 PROCEDURE — 99900031 HC PATIENT EDUCATION (STAT)

## 2023-03-27 PROCEDURE — 83735 ASSAY OF MAGNESIUM: CPT

## 2023-03-27 PROCEDURE — 99233 PR SUBSEQUENT HOSPITAL CARE,LEVL III: ICD-10-PCS | Mod: ,,, | Performed by: INTERNAL MEDICINE

## 2023-03-27 PROCEDURE — 80053 COMPREHEN METABOLIC PANEL: CPT

## 2023-03-27 PROCEDURE — 99233 SBSQ HOSP IP/OBS HIGH 50: CPT | Mod: ,,, | Performed by: PSYCHIATRY & NEUROLOGY

## 2023-03-27 PROCEDURE — 94761 N-INVAS EAR/PLS OXIMETRY MLT: CPT

## 2023-03-27 PROCEDURE — 63600175 PHARM REV CODE 636 W HCPCS

## 2023-03-27 PROCEDURE — 99223 1ST HOSP IP/OBS HIGH 75: CPT | Mod: ,,, | Performed by: INTERNAL MEDICINE

## 2023-03-27 PROCEDURE — 99233 PR SUBSEQUENT HOSPITAL CARE,LEVL III: ICD-10-PCS | Mod: ,,, | Performed by: PSYCHIATRY & NEUROLOGY

## 2023-03-27 PROCEDURE — 25000003 PHARM REV CODE 250: Performed by: PSYCHIATRY & NEUROLOGY

## 2023-03-27 PROCEDURE — 85025 COMPLETE CBC W/AUTO DIFF WBC: CPT

## 2023-03-27 PROCEDURE — 11000001 HC ACUTE MED/SURG PRIVATE ROOM

## 2023-03-27 PROCEDURE — 99223 PR INITIAL HOSPITAL CARE,LEVL III: ICD-10-PCS | Mod: ,,, | Performed by: INTERNAL MEDICINE

## 2023-03-27 PROCEDURE — 84100 ASSAY OF PHOSPHORUS: CPT

## 2023-03-27 RX ORDER — HEPARIN SODIUM 5000 [USP'U]/ML
5000 INJECTION, SOLUTION INTRAVENOUS; SUBCUTANEOUS EVERY 8 HOURS
Status: DISCONTINUED | OUTPATIENT
Start: 2023-03-27 | End: 2023-03-27

## 2023-03-27 RX ORDER — NAPROXEN SODIUM 220 MG/1
81 TABLET, FILM COATED ORAL DAILY
Status: DISCONTINUED | OUTPATIENT
Start: 2023-03-27 | End: 2023-03-27

## 2023-03-27 RX ADMIN — MUPIROCIN: 20 OINTMENT TOPICAL at 08:03

## 2023-03-27 RX ADMIN — APIXABAN 5 MG: 5 TABLET, FILM COATED ORAL at 05:03

## 2023-03-27 RX ADMIN — MUPIROCIN: 20 OINTMENT TOPICAL at 10:03

## 2023-03-27 RX ADMIN — FLUOXETINE 20 MG: 20 CAPSULE ORAL at 10:03

## 2023-03-27 RX ADMIN — Medication 800 MG: at 03:03

## 2023-03-27 RX ADMIN — CARVEDILOL 25 MG: 25 TABLET, FILM COATED ORAL at 10:03

## 2023-03-27 RX ADMIN — CARVEDILOL 25 MG: 25 TABLET, FILM COATED ORAL at 08:03

## 2023-03-27 RX ADMIN — ATORVASTATIN CALCIUM 40 MG: 40 TABLET, FILM COATED ORAL at 10:03

## 2023-03-27 RX ADMIN — ASPIRIN 81 MG 81 MG: 81 TABLET ORAL at 10:03

## 2023-03-27 RX ADMIN — SENNOSIDES AND DOCUSATE SODIUM 1 TABLET: 50; 8.6 TABLET ORAL at 10:03

## 2023-03-27 RX ADMIN — HEPARIN SODIUM 5000 UNITS: 5000 INJECTION INTRAVENOUS; SUBCUTANEOUS at 02:03

## 2023-03-27 RX ADMIN — SENNOSIDES AND DOCUSATE SODIUM 1 TABLET: 50; 8.6 TABLET ORAL at 09:03

## 2023-03-27 NOTE — HOSPITAL COURSE
Patient was admitted with slurred speech. Patient rapidly improved almost back to baseline. 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 Given echo findings, recommend cardiology consult to see if patient will need further inpatient w/u and starting patient on DOAC. He will also benefit from a cardiac monitor at discharge. Discussed with patient's outpatient cardiologist, inpatient cardiologist, and EP, will start patient on DOAC on discharge and patient will have close follow up with his cardiologist and EP. Patient discharged today with instructions to follow up with vascular neurology outpatient, EP, and cardiology. Patient also started on eliquis. MOCA performed on 3/28 which was 23/30.

## 2023-03-27 NOTE — ASSESSMENT & PLAN NOTE
-Stroke risk factor.  Triglycerides 561.  LDL invalid.  -recommend repeating a fasting lipid panel  -increase atorvastatin to 80  -if triglycerides remain elevated on repeat fasting lipid panel, patient may benefit from fibrates

## 2023-03-27 NOTE — ASSESSMENT & PLAN NOTE
67 y.o. male with a significant medical history of HTN, HLD, prostate CA (2022) who presents to the hospital for evaluation of aphasia and RSW.   He was given TNK and taken to IR for endovascular intervention. Patient s/p IR TICI 3 after 2 passes of L M1.     -Patient will need a repeat fasting lipid panel to see what his LDL is and triglycerides. Recommend increasing statin to atorvastatin 80. He may benefit from fibrates if triglycerides are truly elevated, however this will need to be followed outpatient by PCP. Consider reducing dose vs d/c coreg as patient had episodes of bradycardia. 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 Given echo findings, recommend cardiology consult to see if patient will need further inpatient w/u and starting patient on a DOAC. He will also benefit from a cardiac monitor at discharge. Dispo pending cards recs.    Antithrombotics for secondary stroke prevention:DOAC    Statins for secondary stroke prevention and hyperlipidemia, if present:   Statins: Atorvastatin- 80 mg daily    Aggressive risk factor modification: HTN, HLD     Rehab efforts: The patient has been evaluated by a stroke team provider and the therapy needs have been fully considered based off the presenting complaints and exam findings. The following therapy evaluations are needed: PT evaluate and treat, OT evaluate and treat, SLP evaluate and treat, PM&R evaluate for appropriate placement    Diagnostics ordered/pending: None     VTE prophylaxis: Heparin 5000 units SQ every 8 hours  Mechanical prophylaxis: Place SCDs    BP parameters:SBP<160

## 2023-03-27 NOTE — PLAN OF CARE
Spoke to Aminta Streeter PA-C, Ok to stepdown to stroke service    ASA switched to Eliquis per cardiology and vascular neurology recs  Asymptomatic sinus bradycardia, cardiac monitoring; cardiology following  Family updated

## 2023-03-27 NOTE — PROGRESS NOTES
[facility] - Neuro Critical Care  Vascular Neurology  Comprehensive Stroke Center  Progress Note    Assessment/Plan:     * Acute cerebrovascular accident (CVA) due to embolism of left middle cerebral artery  67 y.o. male with a significant medical history of HTN, HLD, prostate CA (2022) who presents to the hospital for evaluation of aphasia and RSW.   He was given TNK and taken to IR for endovascular intervention. Patient s/p IR TICI 3 after 2 passes of L M1.     -Patient will need a repeat fasting lipid panel to see what his LDL is and triglycerides. Recommend increasing statin to atorvastatin 80. He may benefit from fibrates if triglycerides are truly elevated, however this will need to be followed outpatient by PCP. Consider reducing dose vs d/c coreg as patient had episodes of bradycardia. 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 Given echo findings, recommend cardiology consult to see if patient will need further inpatient w/u and starting patient on a DOAC. He will also benefit from a cardiac monitor at discharge. Dispo pending cards recs.    Antithrombotics for secondary stroke prevention:DOAC    Statins for secondary stroke prevention and hyperlipidemia, if present:   Statins: Atorvastatin- 80 mg daily    Aggressive risk factor modification: HTN, HLD     Rehab efforts: The patient has been evaluated by a stroke team provider and the therapy needs have been fully considered based off the presenting complaints and exam findings. The following therapy evaluations are needed: PT evaluate and treat, OT evaluate and treat, SLP evaluate and treat, PM&R evaluate for appropriate placement    Diagnostics ordered/pending: None     VTE prophylaxis: Heparin 5000 units SQ every 8 hours  Mechanical prophylaxis: Place SCDs    BP parameters:SBP<160        Received intravenous tissue plasminogen activator (tPA) in emergency  department  -s/p TNK in the ED.  Admitted to [facility] for close post thrombolytic monitoring.    Completed     Cytotoxic brain edema  -Small area of cytotoxic cerebral edema identified when reviewing brain imaging in the territory of the L middle cerebral artery. There is no mass effect associated with it. We will continue to monitor the patients clinical exam with frequently while in neuro critical care, for any worsening of symptoms which may indicate expansion of the insult and/or area of the edema resulting in clinical change that may require acute intervention to prevent loss of function and/or death. The pattern is suggestive of embolic etiology.        Aphasia  Mild, noted with higher level of conversation  SLP    History of malignant neoplasm of prostate  -Not currently on radiation or chemotherapy, per wife he is in remission.    Mixed hyperlipidemia  -Stroke risk factor.  Triglycerides 561.  LDL invalid.  -recommend repeating a fasting lipid panel  -increase atorvastatin to 80  -if triglycerides remain elevated on repeat fasting lipid panel, patient may benefit from fibrates    Hypertension  -Stroke risk factor    -SBP<160    Hypertrophic cardiomyopathy  -Stroke risk factor  -Follows up with cardiologist Dr. Hill at [facility]  -TTE here mentions presence of this  -Echo also concerning for possible apical pseudoaneurysm, cardiology consulted, appreciate assistance           3/27/2023: Patient will need a repeat fasting lipid panel to see what his LDL is and triglycerides. Recommend increasing statin to atorvastatin 80. He may benefit from fibrates if triglycerides are truly elevated, however this will need to be followed outpatient by PCP. 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 Given echo findings, recommend cardiology consult to see if patient will need further inpatient w/u and starting patient on DOAC. He  will also benefit from a cardiac monitor at discharge. Dispo pending cards recs.      STROKE DOCUMENTATION   Acute Stroke Times   Last Known Normal Date: 03/25/23  Last Known Normal Time: 2230  Symptom Onset Date: 03/26/23  Symptom Onset Time: 0015  Stroke Team Called Date: 03/26/23  Stroke Team Called Time: 0114  Stroke Team Arrival Date: 03/26/23  Stroke Team Arrival Time: 0118 (In ED prior to the patient's arrival)  CT Interpretation Time: 0132  Thrombolytic Therapy Recommended: Yes  CTA Interpretation Time: 0137  Thrombectomy Recommended: Yes  Decision to Treat Time for Tenecteplase: 0132  Decision to Treat Time for IR: 0146    NIH Scale:  1a. Level of Consciousness: 0-->Alert, keenly responsive  1b. LOC Questions: 0-->Answers both questions correctly  1c. LOC Commands: 0-->Performs both tasks correctly  2. Best Gaze: 0-->Normal  3. Visual: 0-->No visual loss  4. Facial Palsy: 0-->Normal symmetrical movements  5a. Motor Arm, Left: 0-->No drift, limb holds 90 (or 45) degrees for full 10 secs  5b. Motor Arm, Right: 0-->No drift, limb holds 90 (or 45) degrees for full 10 secs  6a. Motor Leg, Left: 0-->No drift, leg holds 30 degree position for full 5 secs  6b. Motor Leg, Right: 0-->No drift, leg holds 30 degree position for full 5 secs  7. Limb Ataxia: 0-->Absent  8. Sensory: 0-->Normal, no sensory loss  9. Best Language: 1-->Mild-to-moderate aphasia, some obvious loss of fluency or facility of comprehension, without significant limitation on ideas expressed or form of expression. Reduction of speech and/or comprehension, however, makes conversation. . . (see row details)  10. Dysarthria: 0-->Normal  11. Extinction and Inattention (formerly Neglect): 0-->No abnormality  Total (NIH Stroke Scale): 1       Modified Hunter Score: 0  [place] Coma Scale:    ABCD2 Score:    [id number] Score:   HAS -BLED Score:   ICH Score:   Hunt & Waggoner Classification:      Hemorrhagic change of an Ischemic Stroke: Does this patient  have an ischemic stroke with hemorrhagic changes? No     Neurologic Chief Complaint: L MCA    Subjective:     Interval History: Patient is seen for follow-up neurological assessment and treatment recommendations:     Patient will need a repeat fasting lipid panel to see what his LDL is and triglycerides. Recommend increasing statin to atorvastatin 80. He may benefit from fibrates if triglycerides are truly elevated, however this will need to be followed outpatient by PCP. 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 Given echo findings, recommend cardiology consult to see if patient will need further inpatient w/u and starting patient on DOAC. He will also benefit from a cardiac monitor at discharge. Dispo pending cards recs.    HPI, Past Medical, Family, and Social History remains the same as documented in the initial encounter.     Review of Systems   Constitutional:  Negative for fever.   HENT:  Negative for trouble swallowing.    Eyes:  Negative for visual disturbance.   Respiratory:  Negative for shortness of breath.    Cardiovascular:  Negative for chest pain.   Gastrointestinal:  Negative for vomiting.   Genitourinary:  Negative for difficulty urinating.   Musculoskeletal:  Negative for neck pain.   Skin:  Negative for rash.   Neurological:  Positive for speech difficulty. Negative for weakness and headaches.   Psychiatric/Behavioral:  Negative for behavioral problems.    Scheduled Meds:   aspirin  81 mg Oral Daily    atorvastatin  40 mg Oral Daily    carvediloL  25 mg Oral BID    FLUoxetine  20 mg Oral Daily    heparin (porcine)  5,000 Units Subcutaneous Q8H    mupirocin   Nasal BID    senna-docusate 8.6-50 mg  1 tablet Oral BID     Continuous Infusions:  PRN Meds:acetaminophen, hydrALAZINE, magnesium oxide, magnesium oxide, ondansetron, potassium bicarbonate, potassium bicarbonate, potassium bicarbonate,  potassium, sodium phosphates, potassium, sodium phosphates, potassium, sodium phosphates, sodium chloride 0.9%, sodium chloride 0.9%    Objective:     Vital Signs (Most Recent):  Temp: 97.9 °F (36.6 °C) (03/27/23 1101)  Pulse: 72 (03/27/23 1201)  Resp: 18 (03/27/23 1201)  BP: 132/70 (03/27/23 1201)  SpO2: 97 % (03/27/23 1201)  BP Location: Right arm    Vital Signs Range (Last 24H):  Temp:  [97.7 °F (36.5 °C)-98.8 °F (37.1 °C)]   Pulse:  [52-80]   Resp:  [10-24]   BP: (100-148)/(55-78)   SpO2:  [93 %-98 %]   BP Location: Right arm    Physical Exam  Vitals and nursing note reviewed.   Constitutional:       General: He is not in acute distress.  HENT:      Head: Normocephalic and atraumatic.      Right Ear: External ear normal.      Left Ear: External ear normal.      Nose: Nose normal.      Mouth/Throat:      Mouth: Mucous membranes are moist.   Eyes:      Extraocular Movements: Extraocular movements intact.      Conjunctiva/sclera: Conjunctivae normal.   Cardiovascular:      Rate and Rhythm: Normal rate.   Pulmonary:      Effort: Pulmonary effort is normal. No respiratory distress.   Abdominal:      General: There is no distension.   Musculoskeletal:         General: Normal range of motion.      Cervical back: Normal range of motion.   Skin:     General: Skin is warm and dry.   Neurological:      Mental Status: He is alert and oriented to person, place, and time.      Sensory: No sensory deficit.      Motor: No weakness.   Psychiatric:         Mood and Affect: Mood normal.         Behavior: Behavior normal.       Neurological Exam:   LOC: alert  Attention Span: Good   Language:mild aphasia with higher level of conversation  Articulation: no dysarthria  Orientation: oriented to person, place, time  EOM (CN III, IV, VI): intact  Facial Movement (CN VII): symmetric  Motor: moves all extremities spontaneously and against gravity  Sensation: intact to light touch    Laboratory:  CMP:   Recent Labs   Lab 03/27/23  0111    CALCIUM 9.1   ALBUMIN 3.7   PROT 5.8*      K 4.3   CO2 23      BUN 21   CREATININE 1.2   ALKPHOS 64   ALT 20   AST 12   BILITOT 0.6     CBC:   Recent Labs   Lab 03/27/23  0111   WBC 11.73   RBC 4.12*   HGB 12.3*   HCT 35.0*      MCV 85   MCH 29.9   MCHC 35.1     Lipid Panel:   Recent Labs   Lab 03/26/23 0143   CHOL 174   LDLCALC Invalid, Trig>400.0   HDL 26*   TRIG 561*     Coagulation:   Recent Labs   Lab 03/26/23 0143   INR 1.1     Platelet Aggregation Study: No results for input(s): PLTAGG, PLTAGINTERP, PLTAGREGLACO, ADPPLTAGGREG in the last 168 hours.  Hgb A1C:   Recent Labs   Lab 03/26/23 0143   HGBA1C 5.2     TSH:   Recent Labs   Lab 03/26/23 0143   TSH 3.352       Diagnostic Results     Brain Imaging   MRI Brain WO 3/26/2023    Findings consistent with acute left MCA territory infarct involving the left basal ganglia and left frontal insular cortex, as above.  No evidence of hemorrhagic conversion.    Vessel Imaging   IR angio 3/26/2023  Left M1 occlusion. Aspiration thrombectomy performed with restoration of TICI 3 flow after 2 passes. Ken Balloon Guide, 6 Fr Gail and 4 Max aspiration catheters were used.     CTA Stroke MP 3/26/2023  Acute left MCA stroke with left M1 occlusion.     This report was flagged in Epic as abnormal.     Neurointerventional Radiology is aware of this finding and is proceeding to take the patient for thrombectomy.    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 atypical takotsubo cardiomyopathy, suspect former.   Mild left atrial enlargement.   The left ventricle is normal in size with low normal systolic function.   The estimated ejection fraction is 50%.   Indeterminate left ventricular diastolic function.   There are segmental left ventricular wall motion abnormalities.  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   Intermediate central  venous pressure (8 mmHg).   The estimated PA systolic pressure is 17 mmHg.   Mild tricuspid regurgitation.      Aminta Streeter PA-C  [facility]  Department of Vascular Neurology   [facility] - Neuro Critical Care

## 2023-03-27 NOTE — HPI
Mr. Munoz is a 68 yo M w/ PMH of HTN, HLD, prostate CA, and HCM admitted to [facility] on 3/26 after having problems speaking and R sided weakness. LKN 2230 according to wife. CT Head negative for acute hemorrhage and given TNK. CTA showed L M1 occlusion and he was taken to IR for thrombectomy. Patient sees Dr. Hill and last saw him within the last 2 months. Although unable to see records in system and family requesting to obtain them. One day s/p thrombectomy patient developed asymptomatic bradycardia and subsequent drop in BP. EKG sinus heraclio w/ ST depression and T wave inversion. Troponin 0.01. ECHO 3/26 showed LV c/f apical hypertrophic CM w/ apical pseudoaneurysm vs atypical Takotsubo CM w/ mild LAE, EF 50%, and segmental LV wall motion abnormalities. He has no history of syncope. Reports an uncle may have  of a heart attack in his 50s. All 3 brothers have HOCM. His last angiogram was in  when diagnosed with HOCM but was told he had clean coronaries. Cardiology consulted for management and possible AC therapy. EP consulted for consideration of ICD in a patient with HOCM and apical aneurysm.     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 atypical takotsubo cardiomyopathy, suspect former.  Mild left atrial enlargement.  The left ventricle is normal in size with low normal systolic function.  The estimated ejection fraction is 50%.  Indeterminate left ventricular diastolic function.  There are segmental left ventricular wall motion abnormalities.  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  Intermediate central venous pressure (8 mmHg).  The estimated PA systolic pressure is 17 mmHg.  Mild tricuspid regurgitation.

## 2023-03-27 NOTE — CONSULTS
[facility] - Neuro Critical Care  Cardiac Electrophysiology  Consult Note    Admission Date: 3/26/2023  Code Status: Full Code   Attending Provider: Ron Gastelum MD  Consulting Provider: Kayce Dominguez MD  Principal Problem:Apical variant hypertrophic cardiomyopathy    Consults  Subjective:     Chief Complaint:  ICD evaluation of apical HOCM with aneurysm    HPI:   Mr. Munoz is a 68 yo M w/ PMH of HTN, HLD, prostate CA, and HCM admitted to [facility] on 3/26 after having problems speaking and R sided weakness. LKN 2230 according to wife. CT Head negative for acute hemorrhage and given TNK. CTA showed L M1 occlusion and he was taken to IR for thrombectomy. Patient sees Dr. Hill and last saw him within the last 2 months. Although unable to see records in system and family requesting to obtain them. One day s/p thrombectomy patient developed asymptomatic bradycardia and subsequent drop in BP. EKG sinus heraclio w/ ST depression and T wave inversion. Troponin 0.01. ECHO 3/26 showed LV c/f apical hypertrophic CM w/ apical pseudoaneurysm vs atypical Takotsubo CM w/ mild LAE, EF 50%, and segmental LV wall motion abnormalities. He has no history of syncope. Reports an uncle may have  of a heart attack in his 50s. All 3 brothers have HOCM. His last angiogram was in  when diagnosed with HOCM but was told he had clean coronaries. Cardiology consulted for management and possible AC therapy. EP consulted for consideration of ICD in a patient with HOCM and apical aneurysm.     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 atypical takotsubo cardiomyopathy, suspect former.   Mild left atrial enlargement.   The left ventricle is normal in size with low normal systolic function.   The estimated ejection fraction is 50%.   Indeterminate left ventricular diastolic function.   There are segmental left ventricular wall motion  abnormalities.  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   Intermediate central venous pressure (8 mmHg).   The estimated PA systolic pressure is 17 mmHg.   Mild tricuspid regurgitation.      Past Medical History:   Diagnosis Date    Cancer 2022    prostate    HLD (hyperlipidemia)     Hypertension        Past Surgical History:   Procedure Laterality Date    CEREBRAL ANGIOGRAM N/A 3/26/2023    Procedure: ANGIOGRAM-CEREBRAL;  Surgeon: Jocelyne Surgeon;  [facility];  Service: Anesthesiology;  Laterality: N/A;       Review of patient's allergies indicates:  No Known Allergies    No current facility-administered medications on file prior to encounter.     Current Outpatient Medications on File Prior to Encounter   Medication Sig    ascorbic acid, vitamin C, (VITAMIN C) 1000 MG tablet 1 tablet.    atorvastatin (LIPITOR) 20 MG tablet Take 20 mg by mouth.    atorvastatin (LIPITOR) 20 MG tablet 1 tablet.    buPROPion (WELLBUTRIN XL) 150 MG TB24 tablet Take 150 mg by mouth once daily.    carvediloL (COREG) 25 MG tablet 1 tablet.    carvediloL (COREG) 25 MG tablet Take 25 mg by mouth 2 (two) times daily.    cefdinir (OMNICEF) 300 MG capsule Take 300 mg by mouth 2 (two) times daily.    cetirizine (ZYRTEC) 10 mg Cap 1 tablet.    FLUoxetine 10 MG capsule Take by mouth.    telmisartan (MICARDIS) 80 MG Tab 1 tablet.    telmisartan (MICARDIS) 80 MG Tab Take 80 mg by mouth.    turmeric 400 mg Cap as directed    zinc gluconate 50 mg tablet 1 tablet.     Family History       Problem Relation (Age of Onset)    Hypertension Brother          Tobacco Use    Smoking status: Never    Smokeless tobacco: Not on file   Substance and Sexual Activity    Alcohol use: Yes    Drug use: Not on file    Sexual activity: Not on file     Review of Systems   All other systems reviewed and are negative.  Objective:     Vital Signs (Most Recent):  Temp: 97.9 °F (36.6 °C) (03/27/23 1101)  Pulse: (!) 57 (03/27/23  1501)  Resp: 18 (03/27/23 1501)  BP: 121/78 (03/27/23 1501)  SpO2: 97 % (03/27/23 1501)   Vital Signs (24h Range):  Temp:  [97.7 °F (36.5 °C)-98.8 °F (37.1 °C)] 97.9 °F (36.6 °C)  Pulse:  [52-80] 57  Resp:  [10-24] 18  SpO2:  [94 %-98 %] 97 %  BP: (100-148)/(55-78) 121/78       Weight: 88.9 kg (196 lb)  Body mass index is 30.7 kg/m².    SpO2: 97 %       Physical Exam  Vitals and nursing note reviewed.   Constitutional:       Appearance: Normal appearance.   Cardiovascular:      Rate and Rhythm: Normal rate and regular rhythm.      Pulses: Normal pulses.      Heart sounds: Normal heart sounds. No murmur heard.  Pulmonary:      Effort: Pulmonary effort is normal.      Breath sounds: Normal breath sounds.   Abdominal:      General: Abdomen is flat.   Musculoskeletal:      Right lower leg: No edema.      Left lower leg: No edema.   Skin:     General: Skin is warm.   Neurological:      Mental Status: He is alert.       Significant Labs: All pertinent lab results from the last 24 hours have been reviewed.                Assessment and Plan:     * Apical variant hypertrophic cardiomyopathy  Mr. Munoz is a 66 yo M w/ PMH of HTN, HLD, prostate CA, and HCM admitted to [facility] on 3/26 after CVA. CTA showed L M1 occlusion and he was taken to IR for thrombectomy Saturday 3/25/23.  One day s/p thrombectomy patient developed asymptomatic bradycardia and subsequent drop in BP. EKG sinus heraclio w/ ST depression and T wave inversion. Troponin 0.01. ECHO 3/26 showed LV c/f apical hypertrophic CM w/ apical pseudoaneurysm vs atypical Takotsubo CM w/ mild LAE, EF 50%, and segmental LV wall motion abnormalities, LV wall thickness normal. EP consulted for consideration of ICD in a patient with HOCM and apical aneurysm.     Today, ASA switched to Eliquis per cardiology and vascular neurology     Recommendations:   - Given HOCM with suspect apical aneurysm, this would be a IIa indication for ICD placement   - Will need neuro to comment on how  long patient will need to be on uninterrupted anticoagulation therapy prior to placement of ICD.   - Follow up with Dr. Good in clinic in 6 weeks to discuss further.      Thank you for your consult. I will sign off. Please contact us if you have any additional questions.    Kayce Dominguez MD  Cardiac Electrophysiology  [facility] Neuro Critical Care

## 2023-03-27 NOTE — SUBJECTIVE & OBJECTIVE
Past Medical History:   Diagnosis Date    Cancer 2022    prostate    HLD (hyperlipidemia)     Hypertension        Past Surgical History:   Procedure Laterality Date    CEREBRAL ANGIOGRAM N/A 3/26/2023    Procedure: ANGIOGRAM-CEREBRAL;  Surgeon: Jocelyne Surgeon;  Location: [facility];  Service: Anesthesiology;  Laterality: N/A;       Review of patient's allergies indicates:  No Known Allergies    No current facility-administered medications on file prior to encounter.     Current Outpatient Medications on File Prior to Encounter   Medication Sig    ascorbic acid, vitamin C, (VITAMIN C) 1000 MG tablet 1 tablet.    atorvastatin (LIPITOR) 20 MG tablet Take 20 mg by mouth.    atorvastatin (LIPITOR) 20 MG tablet 1 tablet.    buPROPion (WELLBUTRIN XL) 150 MG TB24 tablet Take 150 mg by mouth once daily.    carvediloL (COREG) 25 MG tablet 1 tablet.    carvediloL (COREG) 25 MG tablet Take 25 mg by mouth 2 (two) times daily.    cefdinir (OMNICEF) 300 MG capsule Take 300 mg by mouth 2 (two) times daily.    cetirizine (ZYRTEC) 10 mg Cap 1 tablet.    FLUoxetine 10 MG capsule Take by mouth.    telmisartan (MICARDIS) 80 MG Tab 1 tablet.    telmisartan (MICARDIS) 80 MG Tab Take 80 mg by mouth.    turmeric 400 mg Cap as directed    zinc gluconate 50 mg tablet 1 tablet.     Family History       Problem Relation (Age of Onset)    Hypertension Brother          Tobacco Use    Smoking status: Never    Smokeless tobacco: Not on file   Substance and Sexual Activity    Alcohol use: Yes    Drug use: Not on file    Sexual activity: Not on file     Review of Systems   All other systems reviewed and are negative.  Objective:     Vital Signs (Most Recent):  Temp: 97.9 °F (36.6 °C) (03/27/23 1101)  Pulse: (!) 57 (03/27/23 1501)  Resp: 18 (03/27/23 1501)  BP: 121/78 (03/27/23 1501)  SpO2: 97 % (03/27/23 1501)   Vital Signs (24h Range):  Temp:  [97.7 °F (36.5 °C)-98.8 °F (37.1 °C)] 97.9 °F (36.6 °C)  Pulse:  [52-80] 57  Resp:  [10-24] 18  SpO2:  [94  %-98 %] 97 %  BP: (100-148)/(55-78) 121/78       Weight: 88.9 kg (196 lb)  Body mass index is 30.7 kg/m².    SpO2: 97 %       Physical Exam  Vitals and nursing note reviewed.   Constitutional:       Appearance: Normal appearance.   Cardiovascular:      Rate and Rhythm: Normal rate and regular rhythm.      Pulses: Normal pulses.      Heart sounds: Normal heart sounds. No murmur heard.  Pulmonary:      Effort: Pulmonary effort is normal.      Breath sounds: Normal breath sounds.   Abdominal:      General: Abdomen is flat.   Musculoskeletal:      Right lower leg: No edema.      Left lower leg: No edema.   Skin:     General: Skin is warm.   Neurological:      Mental Status: He is alert.       Significant Labs: All pertinent lab results from the last 24 hours have been reviewed.

## 2023-03-27 NOTE — PLAN OF CARE
"[facility] Care Plan    POC reviewed with Peter Munoz at 0530. Pt verbalized understanding. Questions and concerns addressed. No acute events overnight. Pt progressing toward goals. Will continue to monitor. See below and flowsheets for full assessment and VS info.     VSS within ordered parameters overnight  Neuro exam remains stable from previous shift. NIHSS of 0 overnight  Afebrile  Scheduled MRI done last night   Relayed echo findings to [facility] provider overnight  concerning for "apical pseudoaneurysm vs. atypical takotsubo cardiomyopathy, suspect former" per reading physician's interpretation         Is this a stroke patient? yes- Stroke booklet reviewed with patient and family, risk factors identified for patient and stroke booklet remains at bedside for ongoing education.     Neuro:  [place] Coma Scale  Best Eye Response: 4-->(E4) spontaneous  Best Motor Response: 6-->(M6) obeys commands  Best Verbal Response: 5-->(V5) oriented  [place] Coma Scale Score: 15  Assessment Qualifiers: patient not sedated/intubated, no eye obstruction present  Pupil PERRLA: yes     24hr Temp:  [97.8 °F (36.6 °C)-98.8 °F (37.1 °C)]     CV:   Rhythm: sinus arrhythmia  BP goals:   SBP < 140  MAP > 65    Resp:           Plan: N/A    GI/:     Diet/Nutrition Received: regular  Last Bowel Movement: 03/25/23  Voiding Characteristics: voids spontaneously without difficulty    Intake/Output Summary (Last 24 hours) at 3/27/2023 0617  Last data filed at 3/27/2023 0058  Gross per 24 hour   Intake 676.8 ml   Output 1150 ml   Net -473.2 ml     Unmeasured Output  Urine Occurrence: 1    Labs/Accuchecks:  Recent Labs   Lab 03/27/23  0111   WBC 11.73   RBC 4.12*   HGB 12.3*   HCT 35.0*         Recent Labs   Lab 03/27/23  0111      K 4.3   CO2 23      BUN 21   CREATININE 1.2   ALKPHOS 64   ALT 20   AST 12   BILITOT 0.6      Recent Labs   Lab 03/26/23  0143   INR 1.1      Recent Labs   Lab 03/26/23  0444 03/26/23  0828   CPK 60  --  "   CPKMB 1.0  --    TROPONINI 0.010 0.010   MB 1.7  --        Electrolytes: Electrolytes replaced  Accuchecks: none    Gtts:      LDA/Wounds:  Lines/Drains/Airways       Peripheral Intravenous Line  Duration                  Peripheral IV - Single Lumen 03/26/23 0100 18 G Left Antecubital 1 day                  Wounds: No  Wound care consulted: No   Problem: Adjustment to Illness (Stroke, Ischemic/Transient Ischemic Attack)  Goal: Optimal Coping  Outcome: Ongoing, Progressing     Problem: Bowel Elimination Impaired (Stroke, Ischemic/Transient Ischemic Attack)  Goal: Effective Bowel Elimination  Outcome: Ongoing, Progressing     Problem: Cerebral Tissue Perfusion (Stroke, Ischemic/Transient Ischemic Attack)  Goal: Optimal Cerebral Tissue Perfusion  Outcome: Ongoing, Progressing     Problem: Cognitive Impairment (Stroke, Ischemic/Transient Ischemic Attack)  Goal: Optimal Cognitive Function  Outcome: Ongoing, Progressing     Problem: Communication Impairment (Stroke, Ischemic/Transient Ischemic Attack)  Goal: Improved Communication Skills  Outcome: Ongoing, Progressing     Problem: Functional Ability Impaired (Stroke, Ischemic/Transient Ischemic Attack)  Goal: Optimal Functional Ability  Outcome: Ongoing, Progressing     Problem: Respiratory Compromise (Stroke, Ischemic/Transient Ischemic Attack)  Goal: Effective Oxygenation and Ventilation  Outcome: Ongoing, Progressing     Problem: Sensorimotor Impairment (Stroke, Ischemic/Transient Ischemic Attack)  Goal: Improved Sensorimotor Function  Outcome: Ongoing, Progressing     Problem: Swallowing Impairment (Stroke, Ischemic/Transient Ischemic Attack)  Goal: Optimal Eating and Swallowing without Aspiration  Outcome: Ongoing, Progressing     Problem: Urinary Elimination Impaired (Stroke, Ischemic/Transient Ischemic Attack)  Goal: Effective Urinary Elimination  Outcome: Ongoing, Progressing     Problem: Adult Inpatient Plan of Care  Goal: Plan of Care Review  Outcome:  Ongoing, Progressing  Goal: Patient-Specific Goal (Individualized)  Outcome: Ongoing, Progressing  Goal: Absence of Hospital-Acquired Illness or Injury  Outcome: Ongoing, Progressing  Goal: Optimal Comfort and Wellbeing  Outcome: Ongoing, Progressing  Goal: Readiness for Transition of Care  Outcome: Ongoing, Progressing     Problem: Fall Injury Risk  Goal: Absence of Fall and Fall-Related Injury  Outcome: Ongoing, Progressing

## 2023-03-27 NOTE — ASSESSMENT & PLAN NOTE
-Stroke risk factor  -Follows up with cardiologist Dr. Hill at [facility]  -TTE here mentions presence of this  -Echo also concerning for possible apical pseudoaneurysm, cardiology consulted, appreciate assistance

## 2023-03-27 NOTE — ASSESSMENT & PLAN NOTE
-s/p TNK in the ED.  Admitted to [facility] for close post thrombolytic monitoring.    Completed

## 2023-03-27 NOTE — ANESTHESIA POSTPROCEDURE EVALUATION
Anesthesia Post Evaluation    Patient: Peter Munoz    Procedure(s) Performed: Procedure(s) (LRB):  ANGIOGRAM-CEREBRAL (N/A)    Final Anesthesia Type: general      Patient location during evaluation: ICU  Patient participation: Yes- Able to Participate  Level of consciousness: awake and alert  Post-procedure vital signs: reviewed and stable  Pain management: adequate  Airway patency: patent    PONV status at discharge: No PONV  Anesthetic complications: no      Cardiovascular status: blood pressure returned to baseline  Respiratory status: unassisted  Hydration status: euvolemic  Follow-up not needed.          Vitals Value Taken Time   /63 03/26/23 2132   Temp 36.9 °C (98.4 °F) 03/26/23 1638   Pulse 63 03/26/23 2137   Resp 17 03/26/23 2137   SpO2 97 % 03/26/23 2137   Vitals shown include unvalidated device data.      No case tracking events are documented in the log.      Pain/Bigg Score: Pain Rating Prior to Med Admin: 0 (3/26/2023 11:08 AM)  Pain Rating Post Med Admin: 0 (3/26/2023 11:08 AM)

## 2023-03-27 NOTE — PLAN OF CARE
[facility] Care Plan    POC reviewed with Peter Munoz and family at 1400. Pt verbalized understanding. Questions and concerns addressed. No acute events today. Pt progressing toward goals. Will continue to monitor. See below and flowsheets for full assessment and VS info.             Is this a stroke patient? yes- Stroke booklet reviewed with patient, risk factors identified for patient and stroke booklet remains at bedside for ongoing education.     Neuro:  Jacqueline Coma Scale  Best Eye Response: 4-->(E4) spontaneous  Best Motor Response: 6-->(M6) obeys commands  Best Verbal Response: 5-->(V5) oriented  [place] Coma Scale Score: 15  Assessment Qualifiers: patient not sedated/intubated, no eye obstruction present  Pupil PERRLA: yes     24 hr Temp:  [97.7 °F (36.5 °C)-98.8 °F (37.1 °C)]     CV:   Rhythm: sinus arrhythmia  BP goals:   SBP < 180  MAP > 65    Resp:           Plan: N/A    GI/:     Diet/Nutrition Received: regular  Last Bowel Movement: 03/25/23  Voiding Characteristics: voids spontaneously without difficulty    Intake/Output Summary (Last 24 hours) at 3/27/2023 1548  Last data filed at 3/27/2023 1501  Gross per 24 hour   Intake 1696.8 ml   Output 250 ml   Net 1446.8 ml     Unmeasured Output  Urine Occurrence: 1    Labs/Accuchecks:  Recent Labs   Lab 03/27/23  0111   WBC 11.73   RBC 4.12*   HGB 12.3*   HCT 35.0*         Recent Labs   Lab 03/27/23  0111      K 4.3   CO2 23      BUN 21   CREATININE 1.2   ALKPHOS 64   ALT 20   AST 12   BILITOT 0.6      Recent Labs   Lab 03/26/23  0143   INR 1.1      Recent Labs   Lab 03/26/23  0444 03/26/23  0828   CPK 60  --    CPKMB 1.0  --    TROPONINI 0.010 0.010   MB 1.7  --        Electrolytes: Electrolytes replaced  Accuchecks: Q6H    Gtts:      LDA/Wounds:  Lines/Drains/Airways       Peripheral Intravenous Line  Duration                  Peripheral IV - Single Lumen 03/26/23 0100 18 G Left Antecubital 1 day         Peripheral IV - Single Lumen  03/26/23 20 G Right Hand 1 day                  Wounds: No  Wound care consulted: No    Problem: Adult Inpatient Plan of Care  Goal: Plan of Care Review  Outcome: Ongoing, Progressing  Goal: Optimal Comfort and Wellbeing  Outcome: Ongoing, Progressing  Goal: Readiness for Transition of Care  Outcome: Ongoing, Progressing

## 2023-03-27 NOTE — SUBJECTIVE & OBJECTIVE
Neurologic Chief Complaint: L MCA    Subjective:     Interval History: Patient is seen for follow-up neurological assessment and treatment recommendations:     Patient will need a repeat fasting lipid panel to see what his LDL is and triglycerides. Recommend increasing statin to atorvastatin 80. He may benefit from fibrates if triglycerides are truly elevated, however this will need to be followed outpatient by PCP. 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 Given echo findings, recommend cardiology consult to see if patient will need further inpatient w/u and starting patient on DOAC. He will also benefit from a cardiac monitor at discharge. Dispo pending cards recs.    HPI, Past Medical, Family, and Social History remains the same as documented in the initial encounter.     Review of Systems   Constitutional:  Negative for fever.   HENT:  Negative for trouble swallowing.    Eyes:  Negative for visual disturbance.   Respiratory:  Negative for shortness of breath.    Cardiovascular:  Negative for chest pain.   Gastrointestinal:  Negative for vomiting.   Genitourinary:  Negative for difficulty urinating.   Musculoskeletal:  Negative for neck pain.   Skin:  Negative for rash.   Neurological:  Positive for speech difficulty. Negative for weakness and headaches.   Psychiatric/Behavioral:  Negative for behavioral problems.    Scheduled Meds:   aspirin  81 mg Oral Daily    atorvastatin  40 mg Oral Daily    carvediloL  25 mg Oral BID    FLUoxetine  20 mg Oral Daily    heparin (porcine)  5,000 Units Subcutaneous Q8H    mupirocin   Nasal BID    senna-docusate 8.6-50 mg  1 tablet Oral BID     Continuous Infusions:  PRN Meds:acetaminophen, hydrALAZINE, magnesium oxide, magnesium oxide, ondansetron, potassium bicarbonate, potassium bicarbonate, potassium bicarbonate, potassium, sodium phosphates, potassium, sodium phosphates,  potassium, sodium phosphates, sodium chloride 0.9%, sodium chloride 0.9%    Objective:     Vital Signs (Most Recent):  Temp: 97.9 °F (36.6 °C) (03/27/23 1101)  Pulse: 72 (03/27/23 1201)  Resp: 18 (03/27/23 1201)  BP: 132/70 (03/27/23 1201)  SpO2: 97 % (03/27/23 1201)  BP Location: Right arm    Vital Signs Range (Last 24H):  Temp:  [97.7 °F (36.5 °C)-98.8 °F (37.1 °C)]   Pulse:  [52-80]   Resp:  [10-24]   BP: (100-148)/(55-78)   SpO2:  [93 %-98 %]   BP Location: Right arm    Physical Exam  Vitals and nursing note reviewed.   Constitutional:       General: He is not in acute distress.  HENT:      Head: Normocephalic and atraumatic.      Right Ear: External ear normal.      Left Ear: External ear normal.      Nose: Nose normal.      Mouth/Throat:      Mouth: Mucous membranes are moist.   Eyes:      Extraocular Movements: Extraocular movements intact.      Conjunctiva/sclera: Conjunctivae normal.   Cardiovascular:      Rate and Rhythm: Normal rate.   Pulmonary:      Effort: Pulmonary effort is normal. No respiratory distress.   Abdominal:      General: There is no distension.   Musculoskeletal:         General: Normal range of motion.      Cervical back: Normal range of motion.   Skin:     General: Skin is warm and dry.   Neurological:      Mental Status: He is alert and oriented to person, place, and time.      Sensory: No sensory deficit.      Motor: No weakness.   Psychiatric:         Mood and Affect: Mood normal.         Behavior: Behavior normal.       Neurological Exam:   LOC: alert  Attention Span: Good   Language:mild aphasia with higher level of conversation  Articulation: no dysarthria  Orientation: oriented to person, place, time  EOM (CN III, IV, VI): intact  Facial Movement (CN VII): symmetric  Motor: moves all extremities spontaneously and against gravity  Sensation: intact to light touch    Laboratory:  CMP:   Recent Labs   Lab 03/27/23  0111   CALCIUM 9.1   ALBUMIN 3.7   PROT 5.8*      K 4.3   CO2  23      BUN 21   CREATININE 1.2   ALKPHOS 64   ALT 20   AST 12   BILITOT 0.6     CBC:   Recent Labs   Lab 03/27/23  0111   WBC 11.73   RBC 4.12*   HGB 12.3*   HCT 35.0*      MCV 85   MCH 29.9   MCHC 35.1     Lipid Panel:   Recent Labs   Lab 03/26/23 0143   CHOL 174   LDLCALC Invalid, Trig>400.0   HDL 26*   TRIG 561*     Coagulation:   Recent Labs   Lab 03/26/23 0143   INR 1.1     Platelet Aggregation Study: No results for input(s): PLTAGG, PLTAGINTERP, PLTAGREGLACO, ADPPLTAGGREG in the last 168 hours.  Hgb A1C:   Recent Labs   Lab 03/26/23 0143   HGBA1C 5.2     TSH:   Recent Labs   Lab 03/26/23 0143   TSH 3.352       Diagnostic Results     Brain Imaging   MRI Brain WO 3/26/2023    Findings consistent with acute left MCA territory infarct involving the left basal ganglia and left frontal insular cortex, as above.  No evidence of hemorrhagic conversion.    Vessel Imaging   IR angio 3/26/2023  Left M1 occlusion. Aspiration thrombectomy performed with restoration of TICI 3 flow after 2 passes. Ken Balloon Guide, 6 Fr Gail and 4 Max aspiration catheters were used.     CTA Stroke MP 3/26/2023  Acute left MCA stroke with left M1 occlusion.     This report was flagged in Epic as abnormal.     Neurointerventional Radiology is aware of this finding and is proceeding to take the patient for thrombectomy.    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 atypical takotsubo cardiomyopathy, suspect former.  Mild left atrial enlargement.  The left ventricle is normal in size with low normal systolic function.  The estimated ejection fraction is 50%.  Indeterminate left ventricular diastolic function.  There are segmental left ventricular wall motion abnormalities.  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  Intermediate central venous pressure (8 mmHg).  The estimated PA systolic pressure is 17  mmHg.  Mild tricuspid regurgitation.

## 2023-03-27 NOTE — CARE UPDATE
Telephone call made to patient cardiologist Dr. Hill [phone]) as per the request of the patient and his family. Discussed recent echocardiography findings and plan from vascular neurology perspective to place him on Apixaban 5 BID for secondary stroke prevention. Patient to follow up with Dr. Hill outpatient once discharged.

## 2023-03-27 NOTE — ASSESSMENT & PLAN NOTE
Mr. Munoz is a 66 yo M w/ PMH of HTN, HLD, prostate CA, and HCM admitted to [facility] on 3/26 after CVA. CTA showed L M1 occlusion and he was taken to IR for thrombectomy Saturday 3/25/23.  One day s/p thrombectomy patient developed asymptomatic bradycardia and subsequent drop in BP. EKG sinus heraclio w/ ST depression and T wave inversion. Troponin 0.01. ECHO 3/26 showed LV c/f apical hypertrophic CM w/ apical pseudoaneurysm vs atypical Takotsubo CM w/ mild LAE, EF 50%, and segmental LV wall motion abnormalities, LV wall thickness normal. EP consulted for consideration of ICD in a patient with HOCM and apical aneurysm.     Today, ASA switched to Eliquis per cardiology and vascular neurology     Recommendations:   - Given HOCM with suspect apical aneurysm, this would be a IIa indication for ICD placement   - Will need neuro to comment on how long patient will need to be on uninterrupted anticoagulation therapy prior to placement of ICD.   - Follow up with Dr. Good in clinic in 6 weeks to discuss further.

## 2023-03-27 NOTE — SUBJECTIVE & OBJECTIVE
Past Medical History:   Diagnosis Date    Cancer 2022    prostate    HLD (hyperlipidemia)     Hypertension        Past Surgical History:   Procedure Laterality Date    CEREBRAL ANGIOGRAM N/A 3/26/2023    Procedure: ANGIOGRAM-CEREBRAL;  Surgeon: Jocelyne Surgeon;  Location: [facility];  Service: Anesthesiology;  Laterality: N/A;       Review of patient's allergies indicates:  No Known Allergies    No current facility-administered medications on file prior to encounter.     Current Outpatient Medications on File Prior to Encounter   Medication Sig    ascorbic acid, vitamin C, (VITAMIN C) 1000 MG tablet 1 tablet.    atorvastatin (LIPITOR) 20 MG tablet Take 20 mg by mouth.    atorvastatin (LIPITOR) 20 MG tablet 1 tablet.    buPROPion (WELLBUTRIN XL) 150 MG TB24 tablet Take 150 mg by mouth once daily.    carvediloL (COREG) 25 MG tablet 1 tablet.    carvediloL (COREG) 25 MG tablet Take 25 mg by mouth 2 (two) times daily.    cefdinir (OMNICEF) 300 MG capsule Take 300 mg by mouth 2 (two) times daily.    cetirizine (ZYRTEC) 10 mg Cap 1 tablet.    FLUoxetine 10 MG capsule Take by mouth.    telmisartan (MICARDIS) 80 MG Tab 1 tablet.    telmisartan (MICARDIS) 80 MG Tab Take 80 mg by mouth.    turmeric 400 mg Cap as directed    zinc gluconate 50 mg tablet 1 tablet.     Family History       Problem Relation (Age of Onset)    Hypertension Brother          Tobacco Use    Smoking status: Never    Smokeless tobacco: Not on file   Substance and Sexual Activity    Alcohol use: Yes    Drug use: Not on file    Sexual activity: Not on file     Review of Systems   Cardiovascular:  Negative for chest pain and irregular heartbeat.   Respiratory:  Negative for shortness of breath.    Neurological:  Negative for dizziness and headaches.   All other systems reviewed and are negative.  Objective:     Vital Signs (Most Recent):  Temp: 97.9 °F (36.6 °C) (03/27/23 1101)  Pulse: (!) 57 (03/27/23 1501)  Resp: 18 (03/27/23 1501)  BP: 121/78 (03/27/23  1501)  SpO2: 97 % (03/27/23 1501)   Vital Signs (24h Range):  Temp:  [97.7 °F (36.5 °C)-98.8 °F (37.1 °C)] 97.9 °F (36.6 °C)  Pulse:  [52-80] 57  Resp:  [10-24] 18  SpO2:  [94 %-98 %] 97 %  BP: (100-148)/(55-78) 121/78     Weight: 88.9 kg (196 lb)  Body mass index is 30.7 kg/m².    SpO2: 97 %         Intake/Output Summary (Last 24 hours) at 3/27/2023 1546  Last data filed at 3/27/2023 1501  Gross per 24 hour   Intake 1696.8 ml   Output 250 ml   Net 1446.8 ml       Lines/Drains/Airways       Peripheral Intravenous Line  Duration                  Peripheral IV - Single Lumen 03/26/23 0100 18 G Left Antecubital 1 day         Peripheral IV - Single Lumen 03/26/23 20 G Right Hand 1 day                    Physical Exam  Constitutional:       General: He is not in acute distress.     Appearance: Normal appearance. He is not ill-appearing.   HENT:      Head: Normocephalic.   Eyes:      General:         Right eye: No discharge.         Left eye: No discharge.      Extraocular Movements: Extraocular movements intact.      Conjunctiva/sclera: Conjunctivae normal.   Cardiovascular:      Rate and Rhythm: Normal rate and regular rhythm.      Heart sounds: Normal heart sounds. No murmur heard.  Pulmonary:      Effort: Pulmonary effort is normal. No respiratory distress.      Breath sounds: Normal breath sounds. No rhonchi.   Chest:      Chest wall: No tenderness.   Abdominal:      General: Abdomen is flat. There is no distension.      Tenderness: There is no abdominal tenderness.   Musculoskeletal:         General: Normal range of motion.      Right lower leg: No edema.      Left lower leg: No edema.   Skin:     General: Skin is warm.   Neurological:      Mental Status: He is alert and oriented to person, place, and time. Mental status is at baseline.       Significant Labs: CMP   Recent Labs   Lab 03/26/23  0143 03/26/23  0828 03/27/23  0111    133* 137   K 3.2* 4.9 4.3    104 105   CO2 21* 19* 23   GLU 86 137* 112*    BUN 22 20 21   CREATININE 1.1 1.0 1.2   CALCIUM 8.7 9.4 9.1   PROT 6.0  --  5.8*   ALBUMIN 3.6  --  3.7   BILITOT 0.4  --  0.6   ALKPHOS 75  --  64   AST 42*  --  12   ALT 22  --  20   ANIONGAP 11 10 9    and CBC   Recent Labs   Lab 03/26/23  0143 03/27/23  0111   WBC 5.84 11.73   HGB 12.8* 12.3*   HCT 37.5* 35.0*    216       Significant Imaging: Echocardiogram: Transthoracic echo (TTE) complete (Cupid Only):   Results for orders placed or performed during the hospital encounter of 03/26/23   Echo   Result Value Ref Range    BSA 2.05 m2    TDI SEPTAL 0.07 m/s    LV LATERAL E/E' RATIO 6.88 m/s    LV SEPTAL E/E' RATIO 7.86 m/s    LA WIDTH 4.39 cm    TDI LATERAL 0.08 m/s    LVIDd 4.36 3.5 - 6.0 cm    IVS 0.82 0.6 - 1.1 cm    Posterior Wall 0.82 0.6 - 1.1 cm    LVIDs 2.98 2.1 - 4.0 cm    FS 32 28 - 44 %    LA volume 71.80 cm3    Sinus 3.84 cm    STJ 2.64 cm    Ascending aorta 3.14 cm    LV mass 111.35 g    LA size 3.49 cm    RVDD 2.72 cm    TAPSE 1.68 cm    RV S' 15.19 cm/s    Left Ventricle Relative Wall Thickness 0.38 cm    AV mean gradient 4 mmHg    AV valve area 3.10 cm2    AV Velocity Ratio 1.02     AV index (prosthetic) 0.93     MV valve area p 1/2 method 2.53 cm2    E/A ratio 1.20     Mean e' 0.08 m/s    E wave deceleration time 299.31 msec    IVRT 97.05 msec    LVOT diameter 2.06 cm    LVOT area 3.3 cm2    LVOT peak tommy 1.38 m/s    LVOT peak VTI 24.16 cm    Ao peak tommy 1.35 m/s    Ao VTI 25.98 cm    LVOT stroke volume 80.48 cm3    AV peak gradient 7 mmHg    E/E' ratio 7.33 m/s    MV Peak E Tommy 0.55 m/s    TR Max Tommy 1.51 m/s    MV stenosis pressure 1/2 time 86.80 ms    MV Peak A Tommy 0.46 m/s    LV Systolic Volume 34.54 mL    LV Systolic Volume Index 17.3 mL/m2    LV Diastolic Volume 85.63 mL    LV Diastolic Volume Index 42.82 mL/m2    LA Volume Index 35.9 mL/m2    LV Mass Index 56 g/m2    RA Major Axis 4.39 cm    Left Atrium Minor Axis 5.43 cm    Left Atrium Major Axis 5.60 cm    Triscuspid Valve  Regurgitation Peak Gradient 9 mmHg    LA Volume Index (Mod) 28.2 mL/m2    LA volume (mod) 56.35 cm3    RA Width 3.01 cm    Right Atrial Pressure (from IVC) 8 mmHg    EF 50 %    TV rest pulmonary artery pressure 17 mmHg    Narrative    · Left ventricle with morphology consistent with 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 atypical takotsubo   cardiomyopathy, suspect former.  · Mild left atrial enlargement.  · The left ventricle is normal in size with low normal systolic function.  · The estimated ejection fraction is 50%.  · Indeterminate left ventricular diastolic function.  · There are segmental left ventricular wall motion abnormalities.  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  · Intermediate central venous pressure (8 mmHg).  · The estimated PA systolic pressure is 17 mmHg.  · Mild tricuspid regurgitation.

## 2023-03-27 NOTE — PLAN OF CARE
[facility]  Initial Discharge Assessment       Primary Care Provider: Primary Doctor No    Admission Diagnosis: Stroke [I63.9]  Ischemic stroke [I63.9]    Admission Date: 3/26/2023  Expected Discharge Date: 3/30/2023    Discharge Barriers Identified: None    Payor: MEDICARE / Plan: MEDICARE PART A & B / Product Type: Government /     Extended Emergency Contact Information  Primary Emergency Contact: Loren Munoz  Mobile Phone: [phone]  Relation: Spouse  Preferred language: English   needed? No    Discharge Plan A: Home with family  Discharge Plan B: Home with family    No Pharmacies Listed    Initial Assessment (most recent)       Adult Discharge Assessment - 03/27/23 1530          Discharge Assessment    Assessment Type Discharge Planning Assessment     Confirmed/corrected address, phone number and insurance Yes     Confirmed Demographics Correct on Facesheet     Source of Information patient;family   Spouse- Loren Vieira [phone]    If unable to respond/provide information was family/caregiver contacted? --   Spouse- Loren Vieira [phone]    Communicated GEMMA with patient/caregiver Yes     Reason For Admission Acute cerebrovascular accident (CVA) due to embolism of left middle cerebral artery     People in Home spouse   Spouse- Loren Vieira [phone]    Facility Arrived From: Home     Do you expect to return to your current living situation? Yes     Do you have help at home or someone to help you manage your care at home? Yes     Who are your caregiver(s) and their phone number(s)? Spouse- Loren Vieira [phone]     Prior to hospitilization cognitive status: Alert/Oriented     Current cognitive status: Alert/Oriented     Walking or Climbing Stairs --   None    Dressing/Bathing --   none    Do you have any problems with: --   Pt denies    Home Accessibility stairs to enter home     Number of Stairs, Main Entrance other (see comments)   20 stairs    Surface  of Stairs, Main Entrance hardwood     Stair Railings, Main Entrance railings safe and in good condition     Landing, Stairs, Main Entrance railings present     Stairs Comment, Main Entrance 20     Home Layout Able to live on 1st floor     Equipment Currently Used at Home none     Do you currently have service(s) that help you manage your care at home? No     Do you take prescription medications? Yes     Do you have prescription coverage? Yes     Coverage Medicare- Part A and B     Do you have any problems affording any of your prescribed medications? No     Is the patient taking medications as prescribed? yes     Who is going to help you get home at discharge? Spouse- Loren Munoz- [phone]     How do you get to doctors appointments? car, drives self;family or friend will provide     Are you on dialysis? No     Do you take coumadin? No     Discharge Plan A Home with family     Discharge Plan B Home with family     DME Needed Upon Discharge  other (see comments)   TBD    Discharge Plan discussed with: Patient;Spouse/sig other     Name(s) and Number(s) Spouse- Loren Munoz- [phone]     Discharge Barriers Identified None        Physical Activity    On average, how many days per week do you engage in moderate to strenuous exercise (like a brisk walk)? 3 days     On average, how many minutes do you engage in exercise at this level? 110 min        Financial Resource Strain    How hard is it for you to pay for the very basics like food, housing, medical care, and heating? Not very hard        Housing Stability    In the last 12 months, was there a time when you were not able to pay the mortgage or rent on time? No     In the last 12 months, was there a time when you did not have a steady place to sleep or slept in a shelter (including now)? No        Transportation Needs    In the past 12 months, has lack of transportation kept you from medical appointments or from getting medications? No     In the past 12  months, has lack of transportation kept you from meetings, work, or from getting things needed for daily living? No        Food Insecurity    Within the past 12 months, you worried that your food would run out before you got the money to buy more. Never true     Within the past 12 months, the food you bought just didn't last and you didn't have money to get more. Never true        Social Connections    In a typical week, how many times do you talk on the phone with family, friends, or neighbors? More than three times a week     How often do you get together with friends or relatives? More than three times a week     Are you , , , , never , or living with a partner?         OTHER    Name(s) of People in Home Spouse- Loren Vieira [phone]                     SW spoke with patient/family in 9098 for Discharge Planning Assessment. Per patient, Pt lives with spouse in a two story family home on a slab foundation with 20 steps to porch and point of entry.  Patient was independent with ADLS and DID NOT use DME or in-home assistive equipment. Pt is not on dialysis  or coumadin,  takes medications as prescribed / keeps refilled / has resources for all daily and prescriptive needs. Preferred pharmacy is [facility] - Agreeable to bedside delivery. Will have help from Spouse- Loren Vieira [phone] and other immediate family upon discharge - family to provide transportation home.  All questions addressed. Unit and SW direct numbers provided. Will continue to follow for course of hospitalization    3/26/2023  1:23 AM  Stroke [I63.9]  Ischemic stroke [I63.9]    PCP: Primary Doctor No    PHARMACY: No Pharmacies Listed- [facility]    Payor: MEDICARE / Plan: MEDICARE PART A & B / Product Type: Government /       ROMAN Sterling - [facility]  [id number]

## 2023-03-27 NOTE — HPI
Mr. Munoz is a 68 yo M w/ PMH of HTN, HLD, prostate CA, and HCM admitted to [facility] on 3/26 after having problems speaking and R sided weakness. LKN 2230 according to wife. CT Head negative for acute hemorrhage and given TNK. CTA showed L M1 occlusion and he was taken to IR for thrombectomy. Patient sees Dr. Hill and last saw him within the last 2 months. Although unable to see records in system and family requesting to obtain them. One day s/p thrombectomy patient developed asymptomatic bradycardia and subsequent drop in BP. EKG sinus heraclio w/ ST depression and T wave inversion. Troponin 0.01. ECHO 3/26 showed LV c/f apical hypertrophic CM w/ apical pseudoaneurysm vs atypical Takotsubo CM w/ mild LAE, EF 50%, and segmental LV wall motion abnormalities. Cardiology consulted for management and possible AC therapy. Patient does not have a history of A fib noted. He currently takes Lipitor 20mg daily and Coreg 25mg BID. Patient denies chest pain, SOB, dizziness, syncope, edema. Vascular neurology recommending DOAC therapy. Patient has 3 brothers with similar condition of apical HCM w/o complications.

## 2023-03-27 NOTE — CONSULTS
[facility]  Cardiology  Consult Note    Patient Name: Peter Munoz  MRN: [id number]  Admission Date: 3/26/2023  Hospital Length of Stay: 1 days  Code Status: Full Code   Attending Provider: Ron Gastelum MD   Consulting Provider: Marcos Brooks DO  Primary Care Physician: Primary Doctor No  Principal Problem:Acute cerebrovascular accident (CVA) due to embolism of left middle cerebral artery    Patient information was obtained from patient, spouse/SO, past medical records and ER records.     Inpatient consult to Cardiology  Consult performed by: Marcos Brooks DO  Consult ordered by: Elmer Carlson MD        Subjective:     Chief Complaint:  L MCA stroke     HPI:   Mr. Munoz is a 68 yo M w/ PMH of HTN, HLD, prostate CA, and HCM admitted to [facility] on 3/26 after having problems speaking and R sided weakness. LKN 2230 according to wife. CT Head negative for acute hemorrhage and given TNK. CTA showed L M1 occlusion and he was taken to IR for thrombectomy. Patient sees Dr. Hill and last saw him within the last 2 months. Although unable to see records in system and family requesting to obtain them. One day s/p thrombectomy patient developed asymptomatic bradycardia and subsequent drop in BP. EKG sinus heraclio w/ ST depression and T wave inversion. Troponin 0.01. ECHO 3/26 showed LV c/f apical hypertrophic CM w/ apical pseudoaneurysm vs atypical Takotsubo CM w/ mild LAE, EF 50%, and segmental LV wall motion abnormalities. Cardiology consulted for management and possible AC therapy. Patient does not have a history of A fib noted. He currently takes Lipitor 20mg daily and Coreg 25mg BID. Patient denies chest pain, SOB, dizziness, syncope, edema. Vascular neurology recommending DOAC therapy. Patient has 3 brothers with similar condition of apical HCM w/o complications.       Past Medical History:   Diagnosis Date    Cancer 2022    prostate    HLD (hyperlipidemia)     Hypertension        Past Surgical  History:   Procedure Laterality Date    CEREBRAL ANGIOGRAM N/A 3/26/2023    Procedure: ANGIOGRAM-CEREBRAL;  Surgeon: Jocelyne Surgeon;  Location: [facility];  Service: Anesthesiology;  Laterality: N/A;       Review of patient's allergies indicates:  No Known Allergies    No current facility-administered medications on file prior to encounter.     Current Outpatient Medications on File Prior to Encounter   Medication Sig    ascorbic acid, vitamin C, (VITAMIN C) 1000 MG tablet 1 tablet.    atorvastatin (LIPITOR) 20 MG tablet Take 20 mg by mouth.    atorvastatin (LIPITOR) 20 MG tablet 1 tablet.    buPROPion (WELLBUTRIN XL) 150 MG TB24 tablet Take 150 mg by mouth once daily.    carvediloL (COREG) 25 MG tablet 1 tablet.    carvediloL (COREG) 25 MG tablet Take 25 mg by mouth 2 (two) times daily.    cefdinir (OMNICEF) 300 MG capsule Take 300 mg by mouth 2 (two) times daily.    cetirizine (ZYRTEC) 10 mg Cap 1 tablet.    FLUoxetine 10 MG capsule Take by mouth.    telmisartan (MICARDIS) 80 MG Tab 1 tablet.    telmisartan (MICARDIS) 80 MG Tab Take 80 mg by mouth.    turmeric 400 mg Cap as directed    zinc gluconate 50 mg tablet 1 tablet.     Family History       Problem Relation (Age of Onset)    Hypertension Brother          Tobacco Use    Smoking status: Never    Smokeless tobacco: Not on file   Substance and Sexual Activity    Alcohol use: Yes    Drug use: Not on file    Sexual activity: Not on file     Review of Systems   Cardiovascular:  Negative for chest pain and irregular heartbeat.   Respiratory:  Negative for shortness of breath.    Neurological:  Negative for dizziness and headaches.   All other systems reviewed and are negative.  Objective:     Vital Signs (Most Recent):  Temp: 97.9 °F (36.6 °C) (03/27/23 1101)  Pulse: (!) 57 (03/27/23 1501)  Resp: 18 (03/27/23 1501)  BP: 121/78 (03/27/23 1501)  SpO2: 97 % (03/27/23 1501)   Vital Signs (24h Range):  Temp:  [97.7 °F (36.5 °C)-98.8 °F (37.1 °C)] 97.9 °F  (36.6 °C)  Pulse:  [52-80] 57  Resp:  [10-24] 18  SpO2:  [94 %-98 %] 97 %  BP: (100-148)/(55-78) 121/78     Weight: 88.9 kg (196 lb)  Body mass index is 30.7 kg/m².    SpO2: 97 %         Intake/Output Summary (Last 24 hours) at 3/27/2023 1546  Last data filed at 3/27/2023 1501  Gross per 24 hour   Intake 1696.8 ml   Output 250 ml   Net 1446.8 ml       Lines/Drains/Airways       Peripheral Intravenous Line  Duration                  Peripheral IV - Single Lumen 03/26/23 0100 18 G Left Antecubital 1 day         Peripheral IV - Single Lumen 03/26/23 20 G Right Hand 1 day                    Physical Exam  Constitutional:       General: He is not in acute distress.     Appearance: Normal appearance. He is not ill-appearing.   HENT:      Head: Normocephalic.   Eyes:      General:         Right eye: No discharge.         Left eye: No discharge.      Extraocular Movements: Extraocular movements intact.      Conjunctiva/sclera: Conjunctivae normal.   Cardiovascular:      Rate and Rhythm: Normal rate and regular rhythm.      Heart sounds: Normal heart sounds. No murmur heard.  Pulmonary:      Effort: Pulmonary effort is normal. No respiratory distress.      Breath sounds: Normal breath sounds. No rhonchi.   Chest:      Chest wall: No tenderness.   Abdominal:      General: Abdomen is flat. There is no distension.      Tenderness: There is no abdominal tenderness.   Musculoskeletal:         General: Normal range of motion.      Right lower leg: No edema.      Left lower leg: No edema.   Skin:     General: Skin is warm.   Neurological:      Mental Status: He is alert and oriented to person, place, and time. Mental status is at baseline.       Significant Labs: CMP   Recent Labs   Lab 03/26/23  0143 03/26/23  0828 03/27/23  0111    133* 137   K 3.2* 4.9 4.3    104 105   CO2 21* 19* 23   GLU 86 137* 112*   BUN 22 20 21   CREATININE 1.1 1.0 1.2   CALCIUM 8.7 9.4 9.1   PROT 6.0  --  5.8*   ALBUMIN 3.6  --  3.7   BILITOT  0.4  --  0.6   ALKPHOS 75  --  64   AST 42*  --  12   ALT 22  --  20   ANIONGAP 11 10 9    and CBC   Recent Labs   Lab 03/26/23  0143 03/27/23  0111   WBC 5.84 11.73   HGB 12.8* 12.3*   HCT 37.5* 35.0*    216       Significant Imaging: Echocardiogram: Transthoracic echo (TTE) complete (Cupid Only):   Results for orders placed or performed during the hospital encounter of 03/26/23   Echo   Result Value Ref Range    BSA 2.05 m2    TDI SEPTAL 0.07 m/s    LV LATERAL E/E' RATIO 6.88 m/s    LV SEPTAL E/E' RATIO 7.86 m/s    LA WIDTH 4.39 cm    TDI LATERAL 0.08 m/s    LVIDd 4.36 3.5 - 6.0 cm    IVS 0.82 0.6 - 1.1 cm    Posterior Wall 0.82 0.6 - 1.1 cm    LVIDs 2.98 2.1 - 4.0 cm    FS 32 28 - 44 %    LA volume 71.80 cm3    Sinus 3.84 cm    STJ 2.64 cm    Ascending aorta 3.14 cm    LV mass 111.35 g    LA size 3.49 cm    RVDD 2.72 cm    TAPSE 1.68 cm    RV S' 15.19 cm/s    Left Ventricle Relative Wall Thickness 0.38 cm    AV mean gradient 4 mmHg    AV valve area 3.10 cm2    AV Velocity Ratio 1.02     AV index (prosthetic) 0.93     MV valve area p 1/2 method 2.53 cm2    E/A ratio 1.20     Mean e' 0.08 m/s    E wave deceleration time 299.31 msec    IVRT 97.05 msec    LVOT diameter 2.06 cm    LVOT area 3.3 cm2    LVOT peak tommy 1.38 m/s    LVOT peak VTI 24.16 cm    Ao peak tommy 1.35 m/s    Ao VTI 25.98 cm    LVOT stroke volume 80.48 cm3    AV peak gradient 7 mmHg    E/E' ratio 7.33 m/s    MV Peak E Tommy 0.55 m/s    TR Max Tommy 1.51 m/s    MV stenosis pressure 1/2 time 86.80 ms    MV Peak A Tommy 0.46 m/s    LV Systolic Volume 34.54 mL    LV Systolic Volume Index 17.3 mL/m2    LV Diastolic Volume 85.63 mL    LV Diastolic Volume Index 42.82 mL/m2    LA Volume Index 35.9 mL/m2    LV Mass Index 56 g/m2    RA Major Axis 4.39 cm    Left Atrium Minor Axis 5.43 cm    Left Atrium Major Axis 5.60 cm    Triscuspid Valve Regurgitation Peak Gradient 9 mmHg    LA Volume Index (Mod) 28.2 mL/m2    LA volume (mod) 56.35 cm3    RA Width 3.01 cm     Right Atrial Pressure (from IVC) 8 mmHg    EF 50 %    TV rest pulmonary artery pressure 17 mmHg    Narrative    · Left ventricle with morphology consistent with 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 atypical takotsubo   cardiomyopathy, suspect former.  · Mild left atrial enlargement.  · The left ventricle is normal in size with low normal systolic function.  · The estimated ejection fraction is 50%.  · Indeterminate left ventricular diastolic function.  · There are segmental left ventricular wall motion abnormalities.  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  · Intermediate central venous pressure (8 mmHg).  · The estimated PA systolic pressure is 17 mmHg.  · Mild tricuspid regurgitation.        Assessment and Plan:     Apical variant hypertrophic cardiomyopathy  66 yo M w/ PMH of HTN, HLD, prostate CA, and HCM admitted to [facility] on 3/26 s/p IR thrombectomy and TNK after CTA showed L M1 occlusion.  Patient sees Dr. Hill and last saw him within the last 2 months. Although unable to see records in system and family requesting to obtain them. EKG sinus heraclio w/ ST depression and T wave inversion. Troponin 0.01. ECHO 3/26 showed LV c/f apical hypertrophic CM w/ apical pseudoaneurysm vs atypical Takotsubo CM w/ mild LAE, EF 50%, and segmental LV wall motion abnormalities. Cardiology consulted for management and possible AC therapy. Patient does not have a history of A fib noted. He currently takes Lipitor 20mg daily and Coreg 25mg BID. Patient denies chest pain, SOB, dizziness, syncope, edema. Patient has 3 brothers with similar condition of apical HCM w/o complications. Findings likely d/t apical HCM w/ pseudoaneurysm.    Recommendations:   -Recommend starting DOAC therapy due to patient being at high risk of clotting or recurrent stroke.  -Will consult EP for additional evaluation for possible ICD placement or life vest.   -Continue statin therapy.   -Continue home Coreg  25mg BID   -Will likely need EP outpatient follow up at the very least.          VTE Risk Mitigation (From admission, onward)         Ordered     heparin (porcine) injection 5,000 Units  Every 8 hours         03/27/23 0700     IP VTE LOW RISK PATIENT  Once         03/26/23 0212     Place sequential compression device  Until discontinued         03/26/23 0212                Thank you for your consult. I will follow-up with patient. Please contact us if you have any additional questions.    Marcos Brooks, DO  Cardiology   [facility]

## 2023-03-27 NOTE — PROGRESS NOTES
[facility]  Neurocritical Care  Progress Note    Admit Date: 3/26/2023  Service Date: 03/27/2023  Length of Stay: 1    Subjective:     Chief Complaint: Acute cerebrovascular accident (CVA) due to embolism of left middle cerebral artery    History of Present Illness: Mr. Peter Munzo is a 66 y/o M with PMHx of HTN, HLD, prostate cancer, and HCOM who presents to [facility] with a L M1 occlusion. His LKW was 2230, when his wife heard him make a strange sound. He was unable to formulate proper speech or move his right side. [place] [facility] [place] acute onset of aphasia, L gaze preference, and R-sided neglect. CT without acute hemorrhage. TNK end time approximately 0145. CTA with L M1 occlusion. He is pending IR for thrombectomy.     He will be admitted to [facility] for hourly neuro-monitoring and a higher level of care.     03/27/2023 Exam much improved, cardiology consult for echo findings, rehab efforts. Dispo pending cardiology evaluation.      Review of Systems  Objective:     Vitals:  Vitals:    03/27/23 0901 03/27/23 1001 03/27/23 1003 03/27/23 1141   BP: (!) 115/58 (!) 148/73 (!) 148/73    BP Location: Right arm Right arm     Patient Position: Lying Lying     Pulse: (!) 57 80  73   Resp: 15 (!) 24  16   Temp:       TempSrc:       SpO2: (!) 94% 97%  97%   Weight:       Height:             Physical Exam  Constitutionally: Patient resting comfortably. No acute distress.   HEENT: normocephalic, atraumatic. Extraocular movement intact. Anicteric, no conjunctival injection.  Neck: No tracheal deviation. No gross lymphadenopathy.  CV: regular rate, extremities well perfused.  Pulm: No respiratory distress on room air. Equal chest rise bilaterally.  Abdomen: No appreciable distention or ascites.  MSK: No obvious deformities  Skin: No appreciable rashes or jaundice.      Neurologic:  [id number]  A&O x 3, follows commands  Slight Rt facial droop  Moves all extremities against gravity  Regards to touch  throughout    Today I personally reviewed pertinent medications, lines/drains/airways, imaging, cardiology results, laboratory results, microbiology results    Assessment/Plan:     Neuro  * Acute cerebrovascular accident (CVA) due to embolism of left middle cerebral artery  68 y/o M with PMHx of HTN, HLD, prostate cancer, and HCOM presents with acute onset of aphasia, L gaze preference, and R-sided neglect. CTA with L M1 occlusion, LKW 2230 s/p TNK end time approximately 0145. S/P thrombectomy. MRI showed acute left MCA territory infarct involving the left basal ganglia and left frontal insular cortex, no hgic conversion    -Admit to [facility], stroke following  -q1h neuro checks, vital checks  -Echo with concern for suspect apical aneurysm; Cardiology consulted   -SBP < 180  -Statin  -ASA  -PT/OT/SLP as appropriate    Cardiac/Vascular  Hyperlipidemia  statin     Hypertension  SBP < 180 s/p TNK and thrombectomy  Telmisartan 80 daily and Coreg 25 bid at home; restart Coreg  PRN labetalol, hydralazine     Hypertrophic cardiomyopathy  Stroke risk factor  Echo with concern for suspect apical aneurysm  Cardiology consulted   Telmisartan 80 daily and Coreg 25 bid at home; restart Coreg     Oncology  History of malignant neoplasm of prostate  Per wife, patient in remission  PSA       The patient is being Prophylaxed for:  Venous Thromboembolism with: Mechanical & chemical   Stress Ulcer with: None  Ventilator Pneumonia with: none    Activity Orders            Diet Adult Regular (IDDSI Level 7): Regular starting at 03/26 1128    Progressive Mobility Protocol (mobilize patient to their highest level of functioning at least twice daily) starting at 03/26 0800    Turn patient starting at 03/26 0400    Elevate HOB Collagen closure or PERCLOSE plug/device - Elevate HOB 30 degrees with limb immobilized for 2 hours after procedure. starting at 03/26 0345    Elevate HOB starting at 03/26 0209          Full Code       Elmer Carlson,  MD  Neurocritical Care  [facility]

## 2023-03-27 NOTE — ASSESSMENT & PLAN NOTE
68 yo M w/ PMH of HTN, HLD, prostate CA, and HCM admitted to [facility] on 3/26 s/p IR thrombectomy and TNK after CTA showed L M1 occlusion.  Patient sees Dr. Hill and last saw him within the last 2 months. Although unable to see records in system and family requesting to obtain them. EKG sinus heraclio w/ ST depression and T wave inversion. Troponin 0.01. ECHO 3/26 showed LV c/f apical hypertrophic CM w/ apical pseudoaneurysm vs atypical Takotsubo CM w/ mild LAE, EF 50%, and segmental LV wall motion abnormalities. Cardiology consulted for management and possible AC therapy. Patient does not have a history of A fib noted. He currently takes Lipitor 20mg daily and Coreg 25mg BID. Patient denies chest pain, SOB, dizziness, syncope, edema. Patient has 3 brothers with similar condition of apical HCM w/o complications. Findings likely d/t apical HCM w/ pseudoaneurysm.    Recommendations:   -Recommend starting DOAC therapy due to patient being at high risk of clotting or recurrent stroke.  -Will consult EP for additional evaluation for possible ICD placement or life vest.   -Continue statin therapy.   -Continue home Coreg 25mg BID   -Will likely need EP outpatient follow up at the very least.

## 2023-03-28 ENCOUNTER — TELEPHONE (OUTPATIENT)
Dept: ELECTROPHYSIOLOGY | Facility: CLINIC | Age: 68
End: 2023-03-28
Payer: MEDICARE

## 2023-03-28 VITALS
RESPIRATION RATE: 18 BRPM | WEIGHT: 205.25 LBS | BODY MASS INDEX: 32.21 KG/M2 | DIASTOLIC BLOOD PRESSURE: 84 MMHG | TEMPERATURE: 98 F | HEART RATE: 60 BPM | OXYGEN SATURATION: 96 % | HEIGHT: 67 IN | SYSTOLIC BLOOD PRESSURE: 143 MMHG

## 2023-03-28 LAB
ALBUMIN SERPL BCP-MCNC: 3.6 G/DL (ref 3.5–5.2)
ALP SERPL-CCNC: 68 U/L (ref 55–135)
ALT SERPL W/O P-5'-P-CCNC: 21 U/L (ref 10–44)
ANION GAP SERPL CALC-SCNC: 8 MMOL/L (ref 8–16)
AST SERPL-CCNC: 14 U/L (ref 10–40)
BASOPHILS # BLD AUTO: 0.04 K/UL (ref 0–0.2)
BASOPHILS NFR BLD: 0.5 % (ref 0–1.9)
BILIRUB SERPL-MCNC: 0.6 MG/DL (ref 0.1–1)
BUN SERPL-MCNC: 22 MG/DL (ref 8–23)
CALCIUM SERPL-MCNC: 8.6 MG/DL (ref 8.7–10.5)
CHLORIDE SERPL-SCNC: 103 MMOL/L (ref 95–110)
CO2 SERPL-SCNC: 26 MMOL/L (ref 23–29)
CREAT SERPL-MCNC: 1 MG/DL (ref 0.5–1.4)
DIFFERENTIAL METHOD: ABNORMAL
EOSINOPHIL # BLD AUTO: 0.1 K/UL (ref 0–0.5)
EOSINOPHIL NFR BLD: 0.9 % (ref 0–8)
ERYTHROCYTE [DISTWIDTH] IN BLOOD BY AUTOMATED COUNT: 12.5 % (ref 11.5–14.5)
EST. GFR  (NO RACE VARIABLE): >60 ML/MIN/1.73 M^2
GLUCOSE SERPL-MCNC: 109 MG/DL (ref 70–110)
HCT VFR BLD AUTO: 38.9 % (ref 40–54)
HGB BLD-MCNC: 13 G/DL (ref 14–18)
IMM GRANULOCYTES # BLD AUTO: 0.05 K/UL (ref 0–0.04)
IMM GRANULOCYTES NFR BLD AUTO: 0.6 % (ref 0–0.5)
LYMPHOCYTES # BLD AUTO: 2.5 K/UL (ref 1–4.8)
LYMPHOCYTES NFR BLD: 28.4 % (ref 18–48)
MAGNESIUM SERPL-MCNC: 1.7 MG/DL (ref 1.6–2.6)
MCH RBC QN AUTO: 29.6 PG (ref 27–31)
MCHC RBC AUTO-ENTMCNC: 33.4 G/DL (ref 32–36)
MCV RBC AUTO: 89 FL (ref 82–98)
MONOCYTES # BLD AUTO: 0.6 K/UL (ref 0.3–1)
MONOCYTES NFR BLD: 6.6 % (ref 4–15)
NEUTROPHILS # BLD AUTO: 5.5 K/UL (ref 1.8–7.7)
NEUTROPHILS NFR BLD: 63 % (ref 38–73)
NRBC BLD-RTO: 0 /100 WBC
PHOSPHATE SERPL-MCNC: 2.9 MG/DL (ref 2.7–4.5)
PLATELET # BLD AUTO: 185 K/UL (ref 150–450)
PMV BLD AUTO: 10.9 FL (ref 9.2–12.9)
POTASSIUM SERPL-SCNC: 3.9 MMOL/L (ref 3.5–5.1)
PROT SERPL-MCNC: 5.6 G/DL (ref 6–8.4)
RBC # BLD AUTO: 4.39 M/UL (ref 4.6–6.2)
SODIUM SERPL-SCNC: 137 MMOL/L (ref 136–145)
WBC # BLD AUTO: 8.74 K/UL (ref 3.9–12.7)

## 2023-03-28 PROCEDURE — 36415 COLL VENOUS BLD VENIPUNCTURE: CPT

## 2023-03-28 PROCEDURE — 97535 SELF CARE MNGMENT TRAINING: CPT

## 2023-03-28 PROCEDURE — 92523 SPEECH SOUND LANG COMPREHEN: CPT

## 2023-03-28 PROCEDURE — 97116 GAIT TRAINING THERAPY: CPT | Mod: CQ

## 2023-03-28 PROCEDURE — 99232 PR SUBSEQUENT HOSPITAL CARE,LEVL II: ICD-10-PCS | Mod: ,,, | Performed by: INTERNAL MEDICINE

## 2023-03-28 PROCEDURE — 25000003 PHARM REV CODE 250

## 2023-03-28 PROCEDURE — 84100 ASSAY OF PHOSPHORUS: CPT

## 2023-03-28 PROCEDURE — 25000003 PHARM REV CODE 250: Performed by: PSYCHIATRY & NEUROLOGY

## 2023-03-28 PROCEDURE — 83735 ASSAY OF MAGNESIUM: CPT

## 2023-03-28 PROCEDURE — 99238 PR HOSPITAL DISCHARGE DAY,<30 MIN: ICD-10-PCS | Mod: GC,,, | Performed by: PSYCHIATRY & NEUROLOGY

## 2023-03-28 PROCEDURE — 80053 COMPREHEN METABOLIC PANEL: CPT

## 2023-03-28 PROCEDURE — 85025 COMPLETE CBC W/AUTO DIFF WBC: CPT

## 2023-03-28 PROCEDURE — 99232 SBSQ HOSP IP/OBS MODERATE 35: CPT | Mod: ,,, | Performed by: INTERNAL MEDICINE

## 2023-03-28 PROCEDURE — 99238 HOSP IP/OBS DSCHRG MGMT 30/<: CPT | Mod: GC,,, | Performed by: PSYCHIATRY & NEUROLOGY

## 2023-03-28 RX ADMIN — APIXABAN 5 MG: 5 TABLET, FILM COATED ORAL at 09:03

## 2023-03-28 RX ADMIN — CARVEDILOL 25 MG: 25 TABLET, FILM COATED ORAL at 09:03

## 2023-03-28 RX ADMIN — SENNOSIDES AND DOCUSATE SODIUM 1 TABLET: 50; 8.6 TABLET ORAL at 09:03

## 2023-03-28 RX ADMIN — ATORVASTATIN CALCIUM 40 MG: 40 TABLET, FILM COATED ORAL at 09:03

## 2023-03-28 RX ADMIN — FLUOXETINE 20 MG: 20 CAPSULE ORAL at 09:03

## 2023-03-28 NOTE — SUBJECTIVE & OBJECTIVE
Interval History: NAEON. Patient denies chest pain, SOB, dizziness, syncope, and LE swelling. EP saw patient yesterday and will f/u outpatient and continue DOAC therapy. Telemetry shows regular rhythm w/o tachycardia.      Review of Systems   Cardiovascular:  Negative for chest pain, irregular heartbeat, leg swelling and syncope.   Respiratory:  Negative for shortness of breath.    Neurological:  Negative for dizziness and headaches.   All other systems reviewed and are negative.  Objective:     Vital Signs (Most Recent):  Temp: 98.1 °F (36.7 °C) (03/28/23 0722)  Pulse: 60 (03/28/23 1116)  Resp: 18 (03/28/23 0722)  BP: (!) 143/84 (03/28/23 0722)  SpO2: 96 % (03/28/23 0722)   Vital Signs (24h Range):  Temp:  [96.6 °F (35.9 °C)-99.3 °F (37.4 °C)] 98.1 °F (36.7 °C)  Pulse:  [56-71] 60  Resp:  [14-21] 18  SpO2:  [94 %-97 %] 96 %  BP: (113-152)/(59-85) 143/84     Weight: 93.1 kg (205 lb 4 oz)  Body mass index is 32.15 kg/m².     SpO2: 96 %         Intake/Output Summary (Last 24 hours) at 3/28/2023 1243  Last data filed at 3/28/2023 0242  Gross per 24 hour   Intake 720 ml   Output 400 ml   Net 320 ml       Lines/Drains/Airways       Peripheral Intravenous Line  Duration                  Peripheral IV - Single Lumen 03/26/23 0100 18 G Left Antecubital 2 days         Peripheral IV - Single Lumen 03/26/23 20 G Right Hand 2 days                    Physical Exam  Constitutional:       General: He is not in acute distress.     Appearance: Normal appearance. He is not ill-appearing.   HENT:      Head: Normocephalic.   Eyes:      General:         Right eye: No discharge.         Left eye: No discharge.      Extraocular Movements: Extraocular movements intact.      Conjunctiva/sclera: Conjunctivae normal.   Cardiovascular:      Rate and Rhythm: Normal rate and regular rhythm.      Heart sounds: Normal heart sounds. No murmur heard.  Pulmonary:      Effort: Pulmonary effort is normal. No respiratory distress.      Breath sounds:  Normal breath sounds. No rhonchi.   Chest:      Chest wall: No tenderness.   Abdominal:      General: Abdomen is flat. There is no distension.      Tenderness: There is no abdominal tenderness.   Musculoskeletal:         General: Normal range of motion.      Right lower leg: No edema.      Left lower leg: No edema.   Skin:     General: Skin is warm.   Neurological:      Mental Status: He is alert and oriented to person, place, and time. Mental status is at baseline.       Significant Labs: CMP   Recent Labs   Lab 03/27/23  0111 03/28/23  0807    137   K 4.3 3.9    103   CO2 23 26   * 109   BUN 21 22   CREATININE 1.2 1.0   CALCIUM 9.1 8.6*   PROT 5.8* 5.6*   ALBUMIN 3.7 3.6   BILITOT 0.6 0.6   ALKPHOS 64 68   AST 12 14   ALT 20 21   ANIONGAP 9 8    and CBC   Recent Labs   Lab 03/27/23  0111 03/28/23  0807   WBC 11.73 8.74   HGB 12.3* 13.0*   HCT 35.0* 38.9*    185       Significant Imaging: Echocardiogram: Transthoracic echo (TTE) complete ([facility] Only):   Results for orders placed or performed during the hospital encounter of 03/26/23   Echo   Result Value Ref Range    BSA 2.05 m2    TDI SEPTAL 0.07 m/s    LV LATERAL E/E' RATIO 6.88 m/s    LV SEPTAL E/E' RATIO 7.86 m/s    LA WIDTH 4.39 cm    TDI LATERAL 0.08 m/s    LVIDd 4.36 3.5 - 6.0 cm    IVS 0.82 0.6 - 1.1 cm    Posterior Wall 0.82 0.6 - 1.1 cm    LVIDs 2.98 2.1 - 4.0 cm    FS 32 28 - 44 %    LA volume 71.80 cm3    Sinus 3.84 cm    STJ 2.64 cm    Ascending aorta 3.14 cm    LV mass 111.35 g    LA size 3.49 cm    RVDD 2.72 cm    TAPSE 1.68 cm    RV S' 15.19 cm/s    Left Ventricle Relative Wall Thickness 0.38 cm    AV mean gradient 4 mmHg    AV valve area 3.10 cm2    AV Velocity Ratio 1.02     AV index (prosthetic) 0.93     MV valve area p 1/2 method 2.53 cm2    E/A ratio 1.20     Mean e' 0.08 m/s    E wave deceleration time 299.31 msec    IVRT 97.05 msec    LVOT diameter 2.06 cm    LVOT area 3.3 cm2    LVOT peak faith 1.38 m/s    LVOT peak  VTI 24.16 cm    Ao peak tommy 1.35 m/s    Ao VTI 25.98 cm    LVOT stroke volume 80.48 cm3    AV peak gradient 7 mmHg    E/E' ratio 7.33 m/s    MV Peak E Tommy 0.55 m/s    TR Max Tommy 1.51 m/s    MV stenosis pressure 1/2 time 86.80 ms    MV Peak A Tommy 0.46 m/s    LV Systolic Volume 34.54 mL    LV Systolic Volume Index 17.3 mL/m2    LV Diastolic Volume 85.63 mL    LV Diastolic Volume Index 42.82 mL/m2    LA Volume Index 35.9 mL/m2    LV Mass Index 56 g/m2    RA Major Axis 4.39 cm    Left Atrium Minor Axis 5.43 cm    Left Atrium Major Axis 5.60 cm    Triscuspid Valve Regurgitation Peak Gradient 9 mmHg    LA Volume Index (Mod) 28.2 mL/m2    LA volume (mod) 56.35 cm3    RA Width 3.01 cm    Right Atrial Pressure (from IVC) 8 mmHg    EF 50 %    TV rest pulmonary artery pressure 17 mmHg    Narrative    · Left ventricle with morphology consistent with 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 atypical takotsubo   cardiomyopathy, suspect former.  · Mild left atrial enlargement.  · The left ventricle is normal in size with low normal systolic function.  · The estimated ejection fraction is 50%.  · Indeterminate left ventricular diastolic function.  · There are segmental left ventricular wall motion abnormalities.  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  · Intermediate central venous pressure (8 mmHg).  · The estimated PA systolic pressure is 17 mmHg.  · Mild tricuspid regurgitation.

## 2023-03-28 NOTE — SUBJECTIVE & OBJECTIVE
Neurologic Chief Complaint: speech difficulty    Subjective:     Interval History: Patient is seen for follow-up neurological assessment and treatment recommendations: No acute events overnight. Patient stepped down to stroke floor. Patient to be discharged home.     HPI, Past Medical, Family, and Social History remains the same as documented in the initial encounter.     Review of Systems   Constitutional:  Negative for fever.   HENT:  Negative for trouble swallowing.    Eyes:  Negative for visual disturbance.   Respiratory:  Negative for shortness of breath.    Cardiovascular:  Negative for chest pain.   Gastrointestinal:  Negative for vomiting.   Genitourinary:  Negative for difficulty urinating.   Musculoskeletal:  Negative for neck pain.   Skin:  Negative for rash.   Neurological:  Positive for speech difficulty. Negative for weakness and headaches.   Psychiatric/Behavioral:  Negative for behavioral problems.    Scheduled Meds:   apixaban  5 mg Oral BID    atorvastatin  40 mg Oral Daily    carvediloL  25 mg Oral BID    FLUoxetine  20 mg Oral Daily    mupirocin   Nasal BID    senna-docusate 8.6-50 mg  1 tablet Oral BID     Continuous Infusions:  PRN Meds:acetaminophen, hydrALAZINE, ondansetron, sodium chloride 0.9%, sodium chloride 0.9%    Objective:     Vital Signs (Most Recent):  Temp: 98.1 °F (36.7 °C) (03/28/23 0722)  Pulse: 60 (03/28/23 1116)  Resp: 18 (03/28/23 0722)  BP: (!) 143/84 (03/28/23 0722)  SpO2: 96 % (03/28/23 0722)  BP Location: Right arm    Vital Signs Range (Last 24H):  Temp:  [96.6 °F (35.9 °C)-99.3 °F (37.4 °C)]   Pulse:  [56-71]   Resp:  [14-21]   BP: (113-152)/(59-85)   SpO2:  [94 %-97 %]   BP Location: Right arm    Physical Exam  Vitals and nursing note reviewed.   Constitutional:       General: He is not in acute distress.  HENT:      Head: Normocephalic and atraumatic.      Right Ear: External ear normal.      Left Ear: External ear normal.      Nose: Nose normal.      Mouth/Throat:       Mouth: Mucous membranes are moist.   Eyes:      Extraocular Movements: Extraocular movements intact.      Conjunctiva/sclera: Conjunctivae normal.   Cardiovascular:      Rate and Rhythm: Normal rate.   Pulmonary:      Effort: Pulmonary effort is normal. No respiratory distress.   Abdominal:      General: There is no distension.   Musculoskeletal:         General: Normal range of motion.      Cervical back: Normal range of motion.   Skin:     General: Skin is warm and dry.   Neurological:      Mental Status: He is alert and oriented to person, place, and time.      Sensory: No sensory deficit.      Motor: No weakness.   Psychiatric:         Mood and Affect: Mood normal.         Behavior: Behavior normal.       Neurological Exam:   LOC: alert  Attention Span: Good   Language:mild aphasia with higher level of conversation  Articulation: no dysarthria  Orientation: oriented to person, place, time  EOM (CN III, IV, VI): intact  Facial Movement (CN VII): symmetric  Motor: moves all extremities spontaneously and against gravity  Sensation: intact to light touch       Laboratory:  CMP:   Recent Labs   Lab 03/28/23  0807   CALCIUM 8.6*   ALBUMIN 3.6   PROT 5.6*      K 3.9   CO2 26      BUN 22   CREATININE 1.0   ALKPHOS 68   ALT 21   AST 14   BILITOT 0.6     CBC:   Recent Labs   Lab 03/28/23  0807   WBC 8.74   RBC 4.39*   HGB 13.0*   HCT 38.9*      MCV 89   MCH 29.6   MCHC 33.4       Diagnostic Results     MOCA 3/28/2023: 23/30, points lost for memory and delayed recall    Brain Imaging   MRI Brain WO 3/26/2023    Findings consistent with acute left MCA territory infarct involving the left basal ganglia and left frontal insular cortex, as above.  No evidence of hemorrhagic conversion.     Vessel Imaging   IR angio 3/26/2023  Left M1 occlusion. Aspiration thrombectomy performed with restoration of TICI 3 flow after 2 passes. Ken Balloon Guide, 6 Fr Gail and 4 Max aspiration catheters were used.      CTA  Stroke MP 3/26/2023  Acute left MCA stroke with left M1 occlusion.     This report was flagged in Epic as abnormal.     Neurointerventional Radiology is aware of this finding and is proceeding to take the patient for thrombectomy.     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 atypical takotsubo cardiomyopathy, suspect former.  Mild left atrial enlargement.  The left ventricle is normal in size with low normal systolic function.  The estimated ejection fraction is 50%.  Indeterminate left ventricular diastolic function.  There are segmental left ventricular wall motion abnormalities.  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  Intermediate central venous pressure (8 mmHg).  The estimated PA systolic pressure is 17 mmHg.  Mild tricuspid regurgitation.

## 2023-03-28 NOTE — NURSING
Nursing Transfer Note       Transfer To      Transfer via wheelchair    Transfer with cardiac monitoring    Transported by Elmer NELSON    Medicines sent: yes    Chart sent with patient: Yes    Belongings sent with patient: prescription glasses, pillow    Notified: spouse    Bedside Neuro assessment performed: Yes    Bedside Handoff given to: NPU RN     Upon arrival to floor: cardiac monitor applied, patient oriented to room, call bell in reach, and bed in lowest position

## 2023-03-28 NOTE — PT/OT/SLP EVAL
Speech Language Pathology Evaluation  Cognitive Communication  Discharge    Patient Name:  Peter Munoz   MRN:  [id number]  [id number]    Admitting Diagnosis: Apical variant hypertrophic cardiomyopathy    Recommendations:     Recommendations:                General Recommendations:  Follow-up not indicated  Diet recommendations:  Regular, Thin   Aspiration Precautions: Standard aspiration precautions   General Precautions: Standard, fall  Communication strategies:  none    History:     Past Medical History:   Diagnosis Date    Cancer 2022    prostate    HLD (hyperlipidemia)     Hypertension        Past Surgical History:   Procedure Laterality Date    CEREBRAL ANGIOGRAM N/A 3/26/2023    Procedure: ANGIOGRAM-CEREBRAL;  Surgeon: Jocelyne Surgeon;  Location: [facility];  Service: Anesthesiology;  Laterality: N/A;     Social History: Patient lives with spouse.    PA note 3/27: Acute cerebrovascular accident (CVA) due to embolism of left middle cerebral artery  67 y.o. male with a significant medical history of HTN, HLD, prostate CA (2022) who presents to the hospital for evaluation of aphasia and RSW.   He was given TNK and taken to IR for endovascular intervention. Patient s/p IR TICI 3 after 2 passes of L M1.   -Patient will need a repeat fasting lipid panel to see what his LDL is and triglycerides. Recommend increasing statin to atorvastatin 80. He may benefit from fibrates if triglycerides are truly elevated, however this will need to be followed outpatient by PCP. Consider reducing dose vs d/c coreg as patient had episodes of bradycardia. 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 Given echo findings, recommend cardiology consult to see if patient will need further inpatient w/u and starting patient on a DOAC. He will also benefit from a cardiac monitor at discharge. Dispo pending cards recs.    Occupation/hobbies/homemaking:  Work in real estate.     Subjective     Patient awake and cooperative. Multiple family members present in room.     Respiratory Status: room air    Objective:       COGNITIVE STATUS:  Behavioral Observations: alert and cooperative  Memory:  Immediate: WFL   Short Term: WFL   Long Term: WFL   Orientation: Oriented x4   Attention: WFL.  Problem Solving: % accuracy  Insight Awareness: pt with good insight   Sequencing: % accuracy with 4 word mental manipulation task; pt with accurate sequencing of functional events  Pragmatics: WNL  Money/Time Management: WFL    LANGUAGE:  Receptive Language:   Complex y/n Questions: % accuracy  Complex Directions: % accuracy    Expressive Language:  Naming: named 15 animals in <1 minute (norm 15-20 in 1 minute)  Conversation: WFL; no noted word finding impairments     Motor Speech: No noted Dysarthria, Apraxia of Speech, Ataxia    Voice: adequate volumate, rate, prosody, breath support    Augmentative Alternative Communication: no    Treatment: SLP provided patient and family education on SLP role, SLP POC, signs of a stroke (reviewed FAST acronym), and d/c from ST services. All questions addressed within SLP scope. Patient and family verbalized understanding of all discussed and are in agreement with POC. Patient and family report patient is at or close to baseline.     Assessment:   Peter Munoz is a 67 y.o. male with no noted significant swallowing, speech, language, or cognitive impairments. No further skilled acute Speech Therapy services warranted at this time. Please re-consult as needed.       Goals:   Multidisciplinary Problems       SLP Goals       Not on file              Multidisciplinary Problems (Resolved)          Problem: SLP    Goal Priority Disciplines Outcome   SLP Goal   (Resolved)     SLP Met   Description: Speech Language Pathology Goals  Goals expected to be met by 4/2  1. Pt will tolerate a regular diet & thin liquids without s/s  aspiration & adequate oral phase of swallow  2. SLP Speech/Language/Cognitive Evaluation                         Plan:     Plan of Care reviewed with:  patient, family   SLP Follow-Up:  No       Discharge recommendations:  Discharge Facility/Level of Care Needs:  (no ST needs)   Barriers to Discharge:  None    Time Tracking:   SLP Treatment Date:   03/28/23  Speech Start Time:  1030  Speech Stop Time:  1047     Speech Total Time (min):  17 min    Billable Minutes: Eval 9  and Self Care/Home Management Training 8    03/28/2023

## 2023-03-28 NOTE — NURSING
Patient discharged. Education given.     Patient visualized leaving floor. Telemetry Dc'd. IV Dc'd. VSS. Aox4.

## 2023-03-28 NOTE — PROGRESS NOTES
Jose Layton - Neurosurgery ([facility])  Vascular Neurology  Comprehensive Stroke Center  Progress Note    Assessment/Plan:     Received intravenous tissue plasminogen activator (tPA) in emergency department  -s/p TNK in the ED.  Admitted to [facility] for close post thrombolytic monitoring.    Completed     Cytotoxic brain edema  -Small area of cytotoxic cerebral edema identified when reviewing brain imaging in the territory of the L middle cerebral artery. There is no mass effect associated with it. We will continue to monitor the patients clinical exam with frequently while in neuro critical care, for any worsening of symptoms which may indicate expansion of the insult and/or area of the edema resulting in clinical change that may require acute intervention to prevent loss of function and/or death. The pattern is suggestive of embolic etiology.        Aphasia  Mild, noted with higher level of conversation  SLP    History of malignant neoplasm of prostate  -Not currently on radiation or chemotherapy, per wife he is in remission.    Acute cerebrovascular accident (CVA) due to embolism of left middle cerebral artery  67 y.o. male with a significant medical history of HTN, HLD, prostate CA (2022) who presents to the hospital for evaluation of aphasia and RSW.   He was given TNK and taken to IR for endovascular intervention. Patient s/p IR TICI 3 after 2 passes of L M1.     -Patient will need a repeat fasting lipid panel to see what his LDL is and triglycerides. Recommend increasing statin to atorvastatin 80. He may benefit from fibrates if triglycerides are truly elevated, however this will need to be followed outpatient by PCP. Consider reducing dose vs d/c coreg as patient had episodes of bradycardia. 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 Given echo findings, recommend cardiology consult to see if patient will need further  inpatient w/u and starting patient on a DOAC. He will also benefit from a cardiac monitor at discharge. Dispo pending cards recs.    Antithrombotics for secondary stroke prevention:DOAC    Statins for secondary stroke prevention and hyperlipidemia, if present:   Statins: Atorvastatin- 80 mg daily    Aggressive risk factor modification: HTN, HLD     Rehab efforts: The patient has been evaluated by a stroke team provider and the therapy needs have been fully considered based off the presenting complaints and exam findings. The following therapy evaluations are needed: PT evaluate and treat, OT evaluate and treat, SLP evaluate and treat, PM&R evaluate for appropriate placement    Diagnostics ordered/pending: None     VTE prophylaxis: Heparin 5000 units SQ every 8 hours  Mechanical prophylaxis: Place SCDs    BP parameters:SBP<160        Mixed hyperlipidemia  -Stroke risk factor.  Triglycerides 561.  LDL invalid.  -recommend repeating a fasting lipid panel  -increase atorvastatin to 80  -if triglycerides remain elevated on repeat fasting lipid panel, patient may benefit from fibrates    Hypertension  -Stroke risk factor    -SBP<160    Hypertrophic cardiomyopathy  -Stroke risk factor  -Follows up with cardiologist Dr. Hill at [facility]  -TTE here mentions presence of this  -Echo also concerning for possible apical pseudoaneurysm, cardiology consulted, appreciate assistance           Patient was admitted with slurred speech. He was given TNK and taken to IR for endovascular intervention. Patient s/p IR TICI 3 after 2 passes of L M1. Patient rapidly improved almost back to baseline. 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 Given echo findings, recommend cardiology consult to see if patient will need further inpatient w/u and starting patient on DOAC. He will also benefit from a cardiac monitor at discharge. Discussed with  patient's outpatient cardiologist, inpatient cardiologist, and EP, will start patient on DOAC on discharge and patient will have close follow up with his cardiologist and EP. Patient discharged today with instructions to follow up with vascular neurology outpatient, EP, and cardiology. Patient also started on eliquis. MOCA performed on 3/28 which was 23/30.       STROKE DOCUMENTATION   Acute Stroke Times   Last Known Normal Date: 03/25/23  Last Known Normal Time: 2230  Symptom Onset Date: 03/26/23  Symptom Onset Time: 0015  Stroke Team Called Date: 03/26/23  Stroke Team Called Time: 0114  Stroke Team Arrival Date: 03/26/23  Stroke Team Arrival Time: 0118 (In ED prior to the patient's arrival)  CT Interpretation Time: 0132  Thrombolytic Therapy Recommended: Yes  CTA Interpretation Time: 0137  Thrombectomy Recommended: Yes  Decision to Treat Time for Tenecteplase: 0132  Decision to Treat Time for IR: 0146    NIH Scale:  1a. Level of Consciousness: 0-->Alert, keenly responsive  1b. LOC Questions: 0-->Answers both questions correctly  1c. LOC Commands: 0-->Performs both tasks correctly  2. Best Gaze: 0-->Normal  3. Visual: 0-->No visual loss  4. Facial Palsy: 0-->Normal symmetrical movements  5a. Motor Arm, Left: 0-->No drift, limb holds 90 (or 45) degrees for full 10 secs  5b. Motor Arm, Right: 0-->No drift, limb holds 90 (or 45) degrees for full 10 secs  6a. Motor Leg, Left: 0-->No drift, leg holds 30 degree position for full 5 secs  6b. Motor Leg, Right: 0-->No drift, leg holds 30 degree position for full 5 secs  7. Limb Ataxia: 0-->Absent  8. Sensory: 0-->Normal, no sensory loss  9. Best Language: 0-->No aphasia, normal  10. Dysarthria: 0-->Normal  11. Extinction and Inattention (formerly Neglect): 0-->No abnormality  Total (NIH Stroke Scale): 0       Modified Morrison Score: 0  Jacqueline Coma Scale:15   ABCD2 Score:    [id number] Score:   HAS -BLED Score:   ICH Score:   Hunt & Waggoner Classification:      Hemorrhagic  change of an Ischemic Stroke: Does this patient have an ischemic stroke with hemorrhagic changes? No     Neurologic Chief Complaint: speech difficulty    Subjective:     Interval History: Patient is seen for follow-up neurological assessment and treatment recommendations: No acute events overnight. Patient stepped down to stroke floor. Patient to be discharged home.     HPI, Past Medical, Family, and Social History remains the same as documented in the initial encounter.     Review of Systems   Constitutional:  Negative for fever.   HENT:  Negative for trouble swallowing.    Eyes:  Negative for visual disturbance.   Respiratory:  Negative for shortness of breath.    Cardiovascular:  Negative for chest pain.   Gastrointestinal:  Negative for vomiting.   Genitourinary:  Negative for difficulty urinating.   Musculoskeletal:  Negative for neck pain.   Skin:  Negative for rash.   Neurological:  Positive for speech difficulty. Negative for weakness and headaches.   Psychiatric/Behavioral:  Negative for behavioral problems.    Scheduled Meds:   apixaban  5 mg Oral BID    atorvastatin  40 mg Oral Daily    carvediloL  25 mg Oral BID    FLUoxetine  20 mg Oral Daily    mupirocin   Nasal BID    senna-docusate 8.6-50 mg  1 tablet Oral BID     Continuous Infusions:  PRN Meds:acetaminophen, hydrALAZINE, ondansetron, sodium chloride 0.9%, sodium chloride 0.9%    Objective:     Vital Signs (Most Recent):  Temp: 98.1 °F (36.7 °C) (03/28/23 0722)  Pulse: 60 (03/28/23 1116)  Resp: 18 (03/28/23 0722)  BP: (!) 143/84 (03/28/23 0722)  SpO2: 96 % (03/28/23 0722)  BP Location: Right arm    Vital Signs Range (Last 24H):  Temp:  [96.6 °F (35.9 °C)-99.3 °F (37.4 °C)]   Pulse:  [56-71]   Resp:  [14-21]   BP: (113-152)/(59-85)   SpO2:  [94 %-97 %]   BP Location: Right arm    Physical Exam  Vitals and nursing note reviewed.   Constitutional:       General: He is not in acute distress.  HENT:      Head: Normocephalic and atraumatic.      Right  Ear: External ear normal.      Left Ear: External ear normal.      Nose: Nose normal.      Mouth/Throat:      Mouth: Mucous membranes are moist.   Eyes:      Extraocular Movements: Extraocular movements intact.      Conjunctiva/sclera: Conjunctivae normal.   Cardiovascular:      Rate and Rhythm: Normal rate.   Pulmonary:      Effort: Pulmonary effort is normal. No respiratory distress.   Abdominal:      General: There is no distension.   Musculoskeletal:         General: Normal range of motion.      Cervical back: Normal range of motion.   Skin:     General: Skin is warm and dry.   Neurological:      Mental Status: He is alert and oriented to person, place, and time.      Sensory: No sensory deficit.      Motor: No weakness.   Psychiatric:         Mood and Affect: Mood normal.         Behavior: Behavior normal.       Neurological Exam:   LOC: alert  Attention Span: Good   Language:mild aphasia with higher level of conversation  Articulation: no dysarthria  Orientation: oriented to person, place, time  EOM (CN III, IV, VI): intact  Facial Movement (CN VII): symmetric  Motor: moves all extremities spontaneously and against gravity  Sensation: intact to light touch       Laboratory:  CMP:   Recent Labs   Lab 03/28/23  0807   CALCIUM 8.6*   ALBUMIN 3.6   PROT 5.6*      K 3.9   CO2 26      BUN 22   CREATININE 1.0   ALKPHOS 68   ALT 21   AST 14   BILITOT 0.6     CBC:   Recent Labs   Lab 03/28/23  0807   WBC 8.74   RBC 4.39*   HGB 13.0*   HCT 38.9*      MCV 89   MCH 29.6   MCHC 33.4       Diagnostic Results     MOCA 3/28/2023: 23/30, points lost for memory and delayed recall    Brain Imaging   MRI Brain WO 3/26/2023    Findings consistent with acute left MCA territory infarct involving the left basal ganglia and left frontal insular cortex, as above.  No evidence of hemorrhagic conversion.     Vessel Imaging   IR angio 3/26/2023  Left M1 occlusion. Aspiration thrombectomy performed with restoration of  TICI 3 flow after 2 passes. Ken Balloon Guide, 6 Fr Agil and 4 Max aspiration catheters were used.      CTA Stroke MP 3/26/2023  Acute left MCA stroke with left M1 occlusion.     This report was flagged in Epic as abnormal.     Neurointerventional Radiology is aware of this finding and is proceeding to take the patient for thrombectomy.     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 atypical takotsubo cardiomyopathy, suspect former.  Mild left atrial enlargement.  The left ventricle is normal in size with low normal systolic function.  The estimated ejection fraction is 50%.  Indeterminate left ventricular diastolic function.  There are segmental left ventricular wall motion abnormalities.  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  Intermediate central venous pressure (8 mmHg).  The estimated PA systolic pressure is 17 mmHg.  Mild tricuspid regurgitation.      Magdaleno Fair MD  Comprehensive Stroke Center  Department of Vascular Neurology   [facility] Neurosurgery [facility])

## 2023-03-28 NOTE — PROGRESS NOTES
Jose Layton - Neurosurgery ([facility])  Cardiology  Progress Note    Patient Name: Peter Munoz  MRN: [id number]  Admission Date: 3/26/2023  Hospital Length of Stay: 2 days  Code Status: Full Code   Attending Physician: Nate Sarkar MD   Primary Care Physician: Primary Doctor No  Expected Discharge Date: 3/28/2023  Principal Problem:Apical variant hypertrophic cardiomyopathy    Subjective:     Hospital Course:   No notes on file    Interval History: NAEON. Patient denies chest pain, SOB, dizziness, syncope, and LE swelling. EP saw patient yesterday and will f/u outpatient and continue DOAC therapy. Telemetry shows regular rhythm w/o tachycardia.      Review of Systems   Cardiovascular:  Negative for chest pain, irregular heartbeat, leg swelling and syncope.   Respiratory:  Negative for shortness of breath.    Neurological:  Negative for dizziness and headaches.   All other systems reviewed and are negative.  Objective:     Vital Signs (Most Recent):  Temp: 98.1 °F (36.7 °C) (03/28/23 0722)  Pulse: 60 (03/28/23 1116)  Resp: 18 (03/28/23 0722)  BP: (!) 143/84 (03/28/23 0722)  SpO2: 96 % (03/28/23 0722)   Vital Signs (24h Range):  Temp:  [96.6 °F (35.9 °C)-99.3 °F (37.4 °C)] 98.1 °F (36.7 °C)  Pulse:  [56-71] 60  Resp:  [14-21] 18  SpO2:  [94 %-97 %] 96 %  BP: (113-152)/(59-85) 143/84     Weight: 93.1 kg (205 lb 4 oz)  Body mass index is 32.15 kg/m².     SpO2: 96 %         Intake/Output Summary (Last 24 hours) at 3/28/2023 1243  Last data filed at 3/28/2023 0242  Gross per 24 hour   Intake 720 ml   Output 400 ml   Net 320 ml       Lines/Drains/Airways       Peripheral Intravenous Line  Duration                  Peripheral IV - Single Lumen 03/26/23 0100 18 G Left Antecubital 2 days         Peripheral IV - Single Lumen 03/26/23 20 G Right Hand 2 days                    Physical Exam  Constitutional:       General: He is not in acute distress.     Appearance: Normal appearance. He is not ill-appearing.   HENT:      Head:  Normocephalic.   Eyes:      General:         Right eye: No discharge.         Left eye: No discharge.      Extraocular Movements: Extraocular movements intact.      Conjunctiva/sclera: Conjunctivae normal.   Cardiovascular:      Rate and Rhythm: Normal rate and regular rhythm.      Heart sounds: Normal heart sounds. No murmur heard.  Pulmonary:      Effort: Pulmonary effort is normal. No respiratory distress.      Breath sounds: Normal breath sounds. No rhonchi.   Chest:      Chest wall: No tenderness.   Abdominal:      General: Abdomen is flat. There is no distension.      Tenderness: There is no abdominal tenderness.   Musculoskeletal:         General: Normal range of motion.      Right lower leg: No edema.      Left lower leg: No edema.   Skin:     General: Skin is warm.   Neurological:      Mental Status: He is alert and oriented to person, place, and time. Mental status is at baseline.       Significant Labs: CMP   Recent Labs   Lab 03/27/23  0111 03/28/23  0807    137   K 4.3 3.9    103   CO2 23 26   * 109   BUN 21 22   CREATININE 1.2 1.0   CALCIUM 9.1 8.6*   PROT 5.8* 5.6*   ALBUMIN 3.7 3.6   BILITOT 0.6 0.6   ALKPHOS 64 68   AST 12 14   ALT 20 21   ANIONGAP 9 8    and CBC   Recent Labs   Lab 03/27/23  0111 03/28/23  0807   WBC 11.73 8.74   HGB 12.3* 13.0*   HCT 35.0* 38.9*    185       Significant Imaging: Echocardiogram: Transthoracic echo (TTE) complete (Cupid Only):   Results for orders placed or performed during the hospital encounter of 03/26/23   Echo   Result Value Ref Range    BSA 2.05 m2    TDI SEPTAL 0.07 m/s    LV LATERAL E/E' RATIO 6.88 m/s    LV SEPTAL E/E' RATIO 7.86 m/s    LA WIDTH 4.39 cm    TDI LATERAL 0.08 m/s    LVIDd 4.36 3.5 - 6.0 cm    IVS 0.82 0.6 - 1.1 cm    Posterior Wall 0.82 0.6 - 1.1 cm    LVIDs 2.98 2.1 - 4.0 cm    FS 32 28 - 44 %    LA volume 71.80 cm3    Sinus 3.84 cm    STJ 2.64 cm    Ascending aorta 3.14 cm    LV mass 111.35 g    LA size 3.49 cm     RVDD 2.72 cm    TAPSE 1.68 cm    RV S' 15.19 cm/s    Left Ventricle Relative Wall Thickness 0.38 cm    AV mean gradient 4 mmHg    AV valve area 3.10 cm2    AV Velocity Ratio 1.02     AV index (prosthetic) 0.93     MV valve area p 1/2 method 2.53 cm2    E/A ratio 1.20     Mean e' 0.08 m/s    E wave deceleration time 299.31 msec    IVRT 97.05 msec    LVOT diameter 2.06 cm    LVOT area 3.3 cm2    LVOT peak tommy 1.38 m/s    LVOT peak VTI 24.16 cm    Ao peak tommy 1.35 m/s    Ao VTI 25.98 cm    LVOT stroke volume 80.48 cm3    AV peak gradient 7 mmHg    E/E' ratio 7.33 m/s    MV Peak E Tommy 0.55 m/s    TR Max Tommy 1.51 m/s    MV stenosis pressure 1/2 time 86.80 ms    MV Peak A Tommy 0.46 m/s    LV Systolic Volume 34.54 mL    LV Systolic Volume Index 17.3 mL/m2    LV Diastolic Volume 85.63 mL    LV Diastolic Volume Index 42.82 mL/m2    LA Volume Index 35.9 mL/m2    LV Mass Index 56 g/m2    RA Major Axis 4.39 cm    Left Atrium Minor Axis 5.43 cm    Left Atrium Major Axis 5.60 cm    Triscuspid Valve Regurgitation Peak Gradient 9 mmHg    LA Volume Index (Mod) 28.2 mL/m2    LA volume (mod) 56.35 cm3    RA Width 3.01 cm    Right Atrial Pressure (from IVC) 8 mmHg    EF 50 %    TV rest pulmonary artery pressure 17 mmHg    Narrative    · Left ventricle with morphology consistent with 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 atypical takotsubo   cardiomyopathy, suspect former.  · Mild left atrial enlargement.  · The left ventricle is normal in size with low normal systolic function.  · The estimated ejection fraction is 50%.  · Indeterminate left ventricular diastolic function.  · There are segmental left ventricular wall motion abnormalities.  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  · Intermediate central venous pressure (8 mmHg).  · The estimated PA systolic pressure is 17 mmHg.  · Mild tricuspid regurgitation.        Assessment and Plan:     * Apical variant hypertrophic cardiomyopathy  66 yo M  w/ PMH of HTN, HLD, prostate CA, and HCM admitted to [facility] on 3/26 s/p IR thrombectomy and TNK after CTA showed L M1 occlusion.  Patient sees Dr. Hill and last saw him within the last 2 months. Although unable to see records in system and family requesting to obtain them. EKG sinus heraclio w/ ST depression and T wave inversion. Troponin 0.01. ECHO 3/26 showed LV c/f apical hypertrophic CM w/ apical pseudoaneurysm vs atypical Takotsubo CM w/ mild LAE, EF 50%, and segmental LV wall motion abnormalities. Cardiology consulted for management and possible AC therapy. Patient does not have a history of A fib noted. He currently takes Lipitor 20mg daily and Coreg 25mg BID. Patient denies chest pain, SOB, dizziness, syncope, edema. Patient has 3 brothers with similar condition of apical HCM w/o complications. Findings likely d/t apical HCM w/ pseudoaneurysm.    Recommendations:   -Agree with continuation of Eliquis 5mg BID due to patient being at high risk of clotting or recurrent stroke.  -EP discussed risk of sudden cardiac death in patients with HCM with an apical aneurysm and the benefit of an ICD in the future.  -Continue statin therapy.   -Continue home Coreg 25mg BID   -Will arrange outpatient follow up with Dr. Good and Dr. Brink within next 4-6 weeks.    Cardiology will sign off for now. Please reach out if you have any additional questions. Thank you.          VTE Risk Mitigation (From admission, onward)         Ordered     apixaban tablet 5 mg  2 times daily         03/27/23 1632     IP VTE LOW RISK PATIENT  Once         03/26/23 0212     Place sequential compression device  Until discontinued         03/26/23 0212                Marcos Brooks,   Cardiology  Jose Layton - Neurosurgery ([facility])

## 2023-03-28 NOTE — PLAN OF CARE
Problem: Adjustment to Illness (Stroke, Ischemic/Transient Ischemic Attack)  Goal: Optimal Coping  Outcome: Ongoing, Progressing     Problem: Bowel Elimination Impaired (Stroke, Ischemic/Transient Ischemic Attack)  Goal: Effective Bowel Elimination  Outcome: Ongoing, Progressing     Problem: Cerebral Tissue Perfusion (Stroke, Ischemic/Transient Ischemic Attack)  Goal: Optimal Cerebral Tissue Perfusion  Outcome: Ongoing, Progressing     Problem: Swallowing Impairment (Stroke, Ischemic/Transient Ischemic Attack)  Goal: Optimal Eating and Swallowing without Aspiration  Outcome: Ongoing, Progressing     Problem: Adult Inpatient Plan of Care  Goal: Plan of Care Review  Outcome: Ongoing, Progressing  Goal: Patient-Specific Goal (Individualized)  Outcome: Ongoing, Progressing  Goal: Optimal Comfort and Wellbeing  Outcome: Ongoing, Progressing  Goal: Readiness for Transition of Care  Outcome: Ongoing, Progressing   Patient ambulating well to bathroom. Full function, speech and strength. [place] to unit. Continued stroke education. A/O x4. wife attentive at bedside. EP MD to eval for ICD placement or life vest. Denies complaints.

## 2023-03-28 NOTE — PT/OT/SLP PROGRESS
"Physical Therapy Treatment    Patient Name:  Peter Munoz   MRN:  [id number]    Recommendations:     Discharge Recommendations: other (see comments)  Discharge Equipment Recommendations: none  Barriers to discharge: None    Assessment:     Peter Munoz is a 67 y.o. male admitted with a medical diagnosis of Apical variant hypertrophic cardiomyopathy.  He presents with the following impairments/functional limitations: weakness, impaired endurance, impaired self care skills, impaired functional mobility, gait instability, impaired balance, decreased safety awareness.    Rehab Prognosis: Good; patient would benefit from acute skilled PT services to address these deficits and reach maximum level of function.    Recent Surgery: Procedure(s) (LRB):  ANGIOGRAM-CEREBRAL (N/A) 2 Days Post-Op    Plan:     During this hospitalization, patient to be seen 3 x/week to address the identified rehab impairments via gait training, therapeutic activities, therapeutic exercises, neuromuscular re-education and progress toward the following goals:    Plan of Care Expires:  04/26/23    Subjective     Chief Complaint: no c/o  Patient/Family Comments/goals: "I've been laying in bed for 3 days, I didn't expect to move perfectly."  Pain/Comfort:  Pain Rating 1: 0/10  Pain Rating Post-Intervention 1: 0/10      Objective:     Communicated with RN prior to session.  Patient found right sidelying, sleeping with telemetry upon PTA entry to room. Spouse and son at bedside. Daughter into room later in session.     General Precautions: Standard, fall  Orthopedic Precautions: N/A  Braces: N/A  Respiratory Status: Room air     Functional Mobility:  Bed Mobility:     Rolling Left:  modified independence  Supine to Sit: modified independence  Sit to Supine: modified independence  Transfers:     Sit to Stand:  supervision with no AD  Gait: Pt ambulates greater than 500 ft with no AD and Supervision. Pt with single LOB with sudden stop and rapid head " turn to L over shoulder in response to MD stating his name. Pt recovers with no change in assistance needed. Pt otherwise steady with no notable instability.       AM-PAC 6 CLICK MOBILITY  Turning over in bed (including adjusting bedclothes, sheets and blankets)?: 4  Sitting down on and standing up from a chair with arms (e.g., wheelchair, bedside commode, etc.): 4  Moving from lying on back to sitting on the side of the bed?: 4  Moving to and from a bed to a chair (including a wheelchair)?: 3  Need to walk in hospital room?: 3  Climbing 3-5 steps with a railing?: 3  Basic Mobility Total Score: 21       Treatment & Education:  Addressed family questions and concerns regarding current assistance needed, functional impairments, recovery process for return to work/driving, and safe transition to home. Deferred as appropriate with PTA scope of practice, directed to communicate with RN, , and MD team regarding any unanswered or out of scope concerns.     Patient left HOB elevated with all lines intact and call button in reach.    GOALS:   Multidisciplinary Problems       Physical Therapy Goals          Problem: Physical Therapy    Goal Priority Disciplines Outcome Goal Variances Interventions   Physical Therapy Goal     PT, PT/OT Ongoing, Progressing     Description: Goals to be met by: 2023     Patient will increase functional independence with mobility by performin. Supine to sit with independence  2. Sit to stand transfer with Modified [place]  3. Gait  x 200 feet with Modified [place] using No Assistive Device.   4. Ascend/descend 5 stair with bilateral Handrails Supervision using No Assistive Device.                          Time Tracking:     PT Received On: 23  PT Start Time: 0954     PT Stop Time: 1011  PT Total Time (min): 17 min     Billable Minutes: Gait Training 17    Treatment Type: Treatment  PT/PTA: PTA     Number of PTA visits since last PT visit: 2023

## 2023-03-28 NOTE — ASSESSMENT & PLAN NOTE
68 yo M w/ PMH of HTN, HLD, prostate CA, and HCM admitted to [facility] on 3/26 s/p IR thrombectomy and TNK after CTA showed L M1 occlusion.  Patient sees Dr. Hill and last saw him within the last 2 months. Although unable to see records in system and family requesting to obtain them. EKG sinus heraclio w/ ST depression and T wave inversion. Troponin 0.01. ECHO 3/26 showed LV c/f apical hypertrophic CM w/ apical pseudoaneurysm vs atypical Takotsubo CM w/ mild LAE, EF 50%, and segmental LV wall motion abnormalities. Cardiology consulted for management and possible AC therapy. Patient does not have a history of A fib noted. He currently takes Lipitor 20mg daily and Coreg 25mg BID. Patient denies chest pain, SOB, dizziness, syncope, edema. Patient has 3 brothers with similar condition of apical HCM w/o complications. Findings likely d/t apical HCM w/ pseudoaneurysm.    Recommendations:   -Agree with continuation of Eliquis 5mg BID due to patient being at high risk of clotting or recurrent stroke.  -EP discussed risk of sudden cardiac death in patients with HCM with an apical aneurysm and the benefit of an ICD in the future.  -Continue statin therapy.   -Continue home Coreg 25mg BID   -Will arrange outpatient follow up with Dr. Good and Dr. Brikn within next 4-6 weeks.    Cardiology will sign off for now. Please reach out if you have any additional questions. Thank you.

## 2023-03-28 NOTE — DISCHARGE SUMMARY
Jose Layton - Neurosurgery ([facility])  Vascular Neurology  Comprehensive Stroke Center  Discharge Summary     Summary:     Admit Date: 3/26/2023  1:23 AM    Discharge Date and Time:  03/28/2023 12:44 PM    Attending Physician: Nate Sarkar MD     Discharge Provider: Magdaleno Fair MD    History of Present Illness: Peter Munoz is a 67 y.o. male with a significant medical history of HTN, HLD, prostate CA (2022) who presents to the hospital for evaluation of aphasia and RSW.  HPI information gathered from review of the patient's medical record, patient's wife due to the patient having severe aphasia.  The patient was LKN around 2230.  Around 0030 the patient's wife heard a noise and woke up and noted the patient to have aphasia and right-sided weakness that continued to progress as she activated EMS.  On EMS arrival the patient was noted to have a right facial droop and right hemiplegia in addition to aphasia.  A stroke code was activated in the field and the patient was brought to the ED.  On arrival to the ED the patient is awake.  He has global aphasia, right facial droop, left gaze preference, left hemianopsia.  VAN+.  NIHSS 24.  He was taken to CT for a CTA Stroke MP.  Imaging reviewed by Dr. Sarkar.  CTH negative, CTA positive for L M1 occlusion.  Discussed the findings with the patient and his wife.  Explained the risks and benefits of thrombolytic therapy with them.  The patient's wife agreed to thrombolytic therapy.  Assessment post thrombolytic therapy mildly improved.  The patient's gaze preference resolved and he had some vocalizations though incomprehensible.  He remained w/RSW, facial droop, and expressive/receptive aphasia.  He was taken to IR for endovascular intervention.            Hospital Course (synopsis of major diagnoses, care, treatment, and services provided during the course of the hospital stay): Patient was admitted with slurred speech. He was given TNK and taken to IR for endovascular  intervention. Patient s/p IR TICI 3 after 2 passes of L M1. Patient rapidly improved almost back to baseline. 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 Given echo findings, recommend cardiology consult to see if patient will need further inpatient w/u and starting patient on DOAC. He will also benefit from a cardiac monitor at discharge. Discussed with patient's outpatient cardiologist, inpatient cardiologist, and EP, will start patient on DOAC on discharge and patient will have close follow up with his cardiologist and EP. Patient discharged today with instructions to follow up with vascular neurology outpatient, EP, and cardiology. Patient also started on eliquis. MOCA performed on 3/28 which was 23/30.       Goals of Care Treatment Preferences:  Code Status: Full Code      Stroke Etiology: Ischemic Large Artery Atherosclerosis/Atheroembolic Intracranial    STROKE DOCUMENTATION   Acute Stroke Times   Last Known Normal Date: 03/25/23  Last Known Normal Time: 2230  Symptom Onset Date: 03/26/23  Symptom Onset Time: 0015  Stroke Team Called Date: 03/26/23  Stroke Team Called Time: 0114  Stroke Team Arrival Date: 03/26/23  Stroke Team Arrival Time: 0118 (In ED prior to the patient's arrival)  CT Interpretation Time: 0132  Thrombolytic Therapy Recommended: Yes  CTA Interpretation Time: 0137  Thrombectomy Recommended: Yes  Decision to Treat Time for Tenecteplase: 0132  Decision to Treat Time for IR: 0146     NIH Scale:  1a. Level of Consciousness: 0-->Alert, keenly responsive  1b. LOC Questions: 0-->Answers both questions correctly  1c. LOC Commands: 0-->Performs both tasks correctly  2. Best Gaze: 0-->Normal  3. Visual: 0-->No visual loss  4. Facial Palsy: 0-->Normal symmetrical movements  5a. Motor Arm, Left: 0-->No drift, limb holds 90 (or 45) degrees for full 10 secs  5b. Motor Arm, Right: 0-->No drift, limb holds 90  (or 45) degrees for full 10 secs  6a. Motor Leg, Left: 0-->No drift, leg holds 30 degree position for full 5 secs  6b. Motor Leg, Right: 0-->No drift, leg holds 30 degree position for full 5 secs  7. Limb Ataxia: 0-->Absent  8. Sensory: 0-->Normal, no sensory loss  9. Best Language: 0-->No aphasia, normal  10. Dysarthria: 0-->Normal  11. Extinction and Inattention (formerly Neglect): 0-->No abnormality  Total (NIH Stroke Scale): 0        Modified [place] Score: 0  [place] Coma Scale:15   ABCD2 Score:    [id number] Score:   HAS -BLED Score:   ICH Score:   Hunt & Waggoner Classification:       Assessment/Plan:     Diagnostic Results:      MOCA 3/28/2023: 23/30, points lost for memory and delayed recall     Brain Imaging   MRI Brain WO 3/26/2023    Findings consistent with acute left MCA territory infarct involving the left basal ganglia and left frontal insular cortex, as above.  No evidence of hemorrhagic conversion.     Vessel Imaging   IR angio 3/26/2023  Left M1 occlusion. Aspiration thrombectomy performed with restoration of TICI 3 flow after 2 passes. Ken Balloon Guide, 6 Fr Gail and 4 Max aspiration catheters were used.      CTA Stroke MP 3/26/2023  Acute left MCA stroke with left M1 occlusion.     This report was flagged in Epic as abnormal.     Neurointerventional Radiology is aware of this finding and is proceeding to take the patient for thrombectomy.     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 atypical takotsubo cardiomyopathy, suspect former.  Mild left atrial enlargement.  The left ventricle is normal in size with low normal systolic function.  The estimated ejection fraction is 50%.  Indeterminate left ventricular diastolic function.  There are segmental left ventricular wall motion abnormalities.  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  Intermediate central venous pressure (8  mmHg).  The estimated PA systolic pressure is 17 mmHg.  Mild tricuspid regurgitation.    Interventions: Thrombectomy    Complications: None    Disposition: Home or Self Care    Final Active Diagnoses:    Diagnosis Date Noted POA    PRINCIPAL PROBLEM:  Apical variant hypertrophic cardiomyopathy [I42.2] 03/27/2023 Unknown    Acute cerebrovascular accident (CVA) due to embolism of left middle cerebral artery [I63.412] 03/26/2023 Yes    Aphasia [R47.01] 03/26/2023 Yes    Cytotoxic brain edema [G93.6] 03/26/2023 Yes    History of malignant neoplasm of prostate [Z85.46] 03/26/2023 Not Applicable    Received intravenous tissue plasminogen activator (tPA) in emergency department [Z92.82] 03/26/2023 Not Applicable    Mixed hyperlipidemia [E78.2] 09/30/2013 Yes    Hypertension [I10] 09/30/2013 Yes    Hypertrophic cardiomyopathy [I42.2] 09/30/2013 Yes      Problems Resolved During this Admission:    Diagnosis Date Noted Date Resolved POA    Prostate cancer [C61] 03/26/2023 03/26/2023 Yes     No new Assessment & Plan notes have been filed under this hospital service since the last note was generated.  Service: Vascular Neurology      Recommendations:     Post-discharge complication risks: None    Stroke Education given to: patient and family    Follow-up in Stroke Clinic in 4-6 weeks.     Discharge Plan:  Anticoagulant: Apixaban 5mg    Follow Up:   Follow-up Information       Isael Brink MD. Schedule an appointment as soon as possible for a visit in 1 month(s).    Specialties: Cardiovascular Disease, Cardiology  Contact information:  [address] [facility] [id number] [phone]               Juan Good MD. Schedule an appointment as soon as possible for a visit in 1 month(s).    Specialties: Electrophysiology, Cardiology  Contact information:  [address] [id number] [phone]                             Patient Instructions:      Ambulatory referral/consult to Vascular Neurology    Standing Status: Future   Referral Priority: Routine Referral Type: Consultation   Referral Reason: Specialty Services Required   Requested Specialty: Vascular Neurology   Number of Visits Requested: 1     Ambulatory referral/consult to Electrophysiology   Standing Status: Future   Referral Priority: Routine Referral Type: Consultation   Referral Reason: Specialty Services Required   Requested Specialty: Electrophysiology   Number of Visits Requested: 1     Diet Cardiac   Order Comments: See Stroke Patient Education Guide Booklet for details.     Call 911 for any of the following:   Order Comments: Call 911  right away if any of the following warning signs come on suddenly, even if the symptoms only last for a few minutes. With stroke, timing is very important.   - Warning Signs of Stroke:  - Weakness: You may feel a sudden weakness, tingling or loss of feeling on one side of your face or body.  - Vision Problems: You may have sudden double vision or trouble seeing in one or both eyes.  - Speech Problems: You may have sudden trouble talking, slured speech, or problems understanding others.  - Headache: You may have sudden, severe headache.  - Movement Problems: You may experience dizziness, a feeling of spinning, a loss of balance, a feeling of falling or blackouts.       Medications:  Reconciled Home Medications:      Medication List        START taking these medications      ELIQUIS 5 mg Tab  Generic drug: apixaban  Take 1 tablet (5 mg total) by mouth 2 (two) times daily.            CHANGE how you take these medications      telmisartan 80 MG Tab  Commonly known as: MICARDIS  Take 80 mg by mouth.  What changed: Another medication with the same name was removed. Continue taking this medication, and follow the directions you see here.            CONTINUE taking these medications      atorvastatin 20 MG tablet  Commonly known as: LIPITOR  Take 20 mg by mouth.     buPROPion 150 MG TB24 tablet  Commonly known as:  WELLBUTRIN XL  Take 150 mg by mouth once daily.     carvediloL 25 MG tablet  Commonly known as: COREG  Take 25 mg by mouth 2 (two) times daily.     FLUoxetine 10 MG capsule  Take by mouth.     turmeric 400 mg Cap  as directed     VITAMIN C 1000 MG tablet  Generic drug: ascorbic acid (vitamin C)  1 tablet.     zinc gluconate 50 mg tablet  1 tablet.     ZyrTEC 10 mg Cap  Generic drug: cetirizine  1 tablet.              Magdaleno Fair MD  Comprehensive Stroke Center  Department of Vascular Neurology   [facility])

## 2023-03-29 NOTE — PHYSICIAN QUERY
PT Name: Peter Munoz  MR #: [id number]    DOCUMENTATION CLARIFICATION     CDS/: Anastasiya Kendrick RN< CDIS        Contact information:[email or web address]      This form is a permanent document in the medical record.     Query Date: March 29, 2023    By submitting this query, we are merely seeking further clarification of documentation. Please utilize your independent clinical judgment when addressing the question(s) below.    The Medical Record contains the following:  Clinical Findings Location in Medical Record   --Acute cerebrovascular accident (CVA) due to embolism of left middle cerebral artery            -He was given TNK and taken to IR for endovascular intervention             --Marked improvement in symptoms, s/p TNK and thrombectomy of left M1; very subtle fluency reduction in higher level conversation, otherwise nonfocal on his exam today            -NIH 24  --Cytotoxic brain edema  -Small area of cytotoxic cerebral edema identified when reviewing brain imaging in the territory of the L middle cerebral artery. There is no mass effect associated with it. We will continue to monitor the patients clinical exam with frequently while in neuro critical care, for any worsening of symptoms which may indicate expansion of the insult and/or area of the edema resulting in clinical change that may require acute intervention to prevent loss of function and/or death. The pattern is suggestive of embolic etiology.    --He presented to [facility] ED with acute onset of aphasia, L gaze preference, and R-sided neglect. CT without acute hemorrhage.  --NIH 21  --Cytotoxic brain edema  See primary problem  Na goal: Eunatremic    --Aphasia  Mild, noted with higher level of conversation  SLP  --Cytotoxic brain edema  --NIH 0  --No acute events overnight. Patient stepped down to stroke floor. Patient to be discharged home.     --Patient rapidly improved almost back to baseline  --Final Active Diagnosis:    - Cytotoxic brain edema      Vas neuro c/s 3/26 (Dr Sarkar/todd, NP)                        NCC H&P 3/26 (ALEENA Rangel)              Vas neuro Note 3/28 ( Dr Fair)              Discharge Summary 3/28     --The brain parenchyma demonstrates subtle loss of gray-white matter differentiation of the left insular cortex with associated hyperdense left MCA sign, concerning for acute stroke.  No hemorrhage or mass.  No mass effect or midline shift.  --Ventricles are normal in size without evidence of hydrocephalus.    --Findings consistent with acute left MCA territory infarct involving the left basal ganglia and left frontal insular cortex, as above.  No evidence of hemorrhagic conversion.  --Cerebral volume loss with prominent sulci and ventricles.  No hydrocephalus.     CTA Stroke 3/26          Brain MRI 3/26     For reporting purposes, the definition for other diagnoses is interpreted as additional conditions that affect patient care in terms of requiring: clinical evaluation; or therapeutic treatment; or diagnostic procedures; or extended length of hospital stay; or increased nursing care and/or monitoring. (ICD-10-CM Official Guidelines for Coding and Reporting FY 2021)     After study, the diagnosis of cerebral edema is:  [ x  ] Clinically significant diagnosis that was monitored and/or treated as follows (please specify): ___monitored for extension or neuroworsening while in ICU and on stepdown unit________   [   ] Present but was inherent to the stroke   [   ] Other explanation (please specify): _________________   [  ] Clinically Undetermined       Please document in your progress notes daily for the duration of treatment until resolved, and include in your discharge summary.    Reference:  ICD-10-CM Official Guidelines for Coding and Reporting FY 2021. (2020). Retrieved April 7, 2021, from  https://www.cdc.gov/[facility]/data/icd/10cmguidelines-KV5571.pdf?fbclid=[email or web address]       Form No. [id number]

## 2023-04-10 ENCOUNTER — TELEPHONE (OUTPATIENT)
Dept: CARDIOLOGY | Facility: CLINIC | Age: 68
End: 2023-04-10
Payer: MEDICARE

## 2023-04-10 NOTE — TELEPHONE ENCOUNTER
Spoke with pt and gave him appt w/Dr Brink 5/3 at 4:00 PM.      MD Lexii Valente MA Lucy,     This is a gentleman I saw in the hospital,     If we could offer him a follow-up sometime towards the end of April or beginning of may, if I have any new patient spots?  Or if not some day when I am on echo at 1:00 p.m..     M

## 2023-04-10 NOTE — TELEPHONE ENCOUNTER
----- Message from Isael Brink MD sent at 3/28/2023  5:58 PM CDT -----  Regarding: Follow up  Chloe,    This is a gentleman I saw in the hospital,    If we could offer him a follow-up sometime towards the end of April or beginning of may, if I have any new patient spots?  Or if not some day when I am on echo at 1:00 p.m..    M

## 2023-04-11 ENCOUNTER — TELEPHONE (OUTPATIENT)
Dept: CARDIOLOGY | Facility: CLINIC | Age: 68
End: 2023-04-11
Payer: MEDICARE

## 2023-04-11 DIAGNOSIS — E78.2 MIXED HYPERLIPIDEMIA: Primary | ICD-10-CM

## 2023-04-11 DIAGNOSIS — I63.412 ACUTE CEREBROVASCULAR ACCIDENT (CVA) DUE TO EMBOLISM OF LEFT MIDDLE CEREBRAL ARTERY: ICD-10-CM

## 2023-04-11 NOTE — TELEPHONE ENCOUNTER
----- Message from Lexii Encarnacion MA sent at 4/10/2023  3:55 PM CDT -----  Regarding: RE: Follow up  Dr Brink - Do you want any tests before the visit? He is seeing you on 5/3 at 4:00 pm.     Thanks  Chloe  ----- Message -----  From: Isael Brink MD  Sent: 3/28/2023   6:33 PM CDT  To: Lexii Encarnacion MA  Subject: Follow up                                        Chloe,    This is a gentleman I saw in the hospital,    If we could offer him a follow-up sometime towards the end of April or beginning of may, if I have any new patient spots?  Or if not some day when I am on echo at 1:00 p.m..    M

## 2023-04-12 NOTE — TELEPHONE ENCOUNTER
----- Message from Isael Brink MD sent at 4/11/2023  9:46 AM CDT -----  Regarding: RE: Follow up  Yes please!  Just put in the orders    Thank you     Isael    ----- Message -----  From: Lexii Encarnacion MA  Sent: 4/10/2023   3:56 PM CDT  To: Isael Brink MD  Subject: RE: Follow up                                    Dr Brink - Do you want any tests before the visit? He is seeing you on 5/3 at 4:00 pm.     Ruby Corona  ----- Message -----  From: Isael Brink MD  Sent: 3/28/2023   6:33 PM CDT  To: Lexii Encarnacion MA  Subject: Follow up                                        Chloe,    This is a gentleman I saw in the hospital,    If we could offer him a follow-up sometime towards the end of April or beginning of may, if I have any new patient spots?  Or if not some day when I am on echo at 1:00 p.m..    M

## 2023-04-12 NOTE — TELEPHONE ENCOUNTER
Spoke with pt - labs scheduled for 5/1.    MD Lexii Valente MA  Yes please!  Just put in the orders     Thank you     Isael Andre Messages       ----- Message -----   From: Lexii Encarnacion MA   Sent: 4/10/2023   3:56 PM CDT   To: Isael Brink MD   Subject: RE: Follow up                                     Dr Brink - Do you want any tests before the visit? He is seeing you on 5/3 at 4:00 pm.     Thanks   Chloe   ----- Message -----   From: Isael Brink MD   Sent: 3/28/2023   6:33 PM CDT   To: Lexii Encarnacion MA   Subject: Follow up                                         Chloe,     This is a gentleman I saw in the hospital,     If we could offer him a follow-up sometime towards the end of April or beginning of may, if I have any new patient spots?  Or if not some day when I am on echo at 1:00 p.m..     M

## 2023-04-17 ENCOUNTER — TELEPHONE (OUTPATIENT)
Dept: ELECTROPHYSIOLOGY | Facility: CLINIC | Age: 68
End: 2023-04-17
Payer: MEDICARE

## 2023-04-24 ENCOUNTER — HOME CARE VISIT (OUTPATIENT)
Dept: NEUROLOGY | Facility: HOSPITAL | Age: 68
End: 2023-04-24
Payer: MEDICARE

## 2023-04-25 VITALS
WEIGHT: 203.38 LBS | SYSTOLIC BLOOD PRESSURE: 124 MMHG | BODY MASS INDEX: 31.86 KG/M2 | HEART RATE: 86 BPM | OXYGEN SATURATION: 96 % | DIASTOLIC BLOOD PRESSURE: 78 MMHG

## 2023-04-25 NOTE — PROGRESS NOTES
Mr. Munoz VS are WNL on today's visit. He has a PMH of HTN, HLP, and Apical HCM. Denies any personal or familial history of stroke. Caregiver wife (katheryn) present throughout visit. SM nurse educated patient and caregiver on stroke RF present, stroke S/S including what to do it they occur, purpose of SM program, medication compliance, and outpatient follow up.

## 2023-05-01 ENCOUNTER — LAB VISIT (OUTPATIENT)
Dept: LAB | Facility: HOSPITAL | Age: 68
End: 2023-05-01
Attending: INTERNAL MEDICINE
Payer: MEDICARE

## 2023-05-01 DIAGNOSIS — I63.412 ACUTE CEREBROVASCULAR ACCIDENT (CVA) DUE TO EMBOLISM OF LEFT MIDDLE CEREBRAL ARTERY: ICD-10-CM

## 2023-05-01 DIAGNOSIS — E78.2 MIXED HYPERLIPIDEMIA: ICD-10-CM

## 2023-05-01 LAB
ALBUMIN SERPL BCP-MCNC: 4.2 G/DL (ref 3.5–5.2)
ALP SERPL-CCNC: 71 U/L (ref 55–135)
ALT SERPL W/O P-5'-P-CCNC: 22 U/L (ref 10–44)
ANION GAP SERPL CALC-SCNC: 8 MMOL/L (ref 8–16)
AST SERPL-CCNC: 16 U/L (ref 10–40)
BASOPHILS # BLD AUTO: 0.02 K/UL (ref 0–0.2)
BASOPHILS NFR BLD: 0.4 % (ref 0–1.9)
BILIRUB SERPL-MCNC: 0.8 MG/DL (ref 0.1–1)
BUN SERPL-MCNC: 15 MG/DL (ref 8–23)
CALCIUM SERPL-MCNC: 9.6 MG/DL (ref 8.7–10.5)
CHLORIDE SERPL-SCNC: 100 MMOL/L (ref 95–110)
CHOLEST SERPL-MCNC: 112 MG/DL (ref 120–199)
CHOLEST/HDLC SERPL: 3.6 {RATIO} (ref 2–5)
CO2 SERPL-SCNC: 29 MMOL/L (ref 23–29)
CREAT SERPL-MCNC: 1.2 MG/DL (ref 0.5–1.4)
DIFFERENTIAL METHOD: NORMAL
EOSINOPHIL # BLD AUTO: 0.1 K/UL (ref 0–0.5)
EOSINOPHIL NFR BLD: 1.4 % (ref 0–8)
ERYTHROCYTE [DISTWIDTH] IN BLOOD BY AUTOMATED COUNT: 12.3 % (ref 11.5–14.5)
EST. GFR  (NO RACE VARIABLE): >60 ML/MIN/1.73 M^2
GLUCOSE SERPL-MCNC: 100 MG/DL (ref 70–110)
HCT VFR BLD AUTO: 46.3 % (ref 40–54)
HDLC SERPL-MCNC: 31 MG/DL (ref 40–75)
HDLC SERPL: 27.7 % (ref 20–50)
HGB BLD-MCNC: 15.4 G/DL (ref 14–18)
IMM GRANULOCYTES # BLD AUTO: 0.02 K/UL (ref 0–0.04)
IMM GRANULOCYTES NFR BLD AUTO: 0.4 % (ref 0–0.5)
LDLC SERPL CALC-MCNC: 61.6 MG/DL (ref 63–159)
LYMPHOCYTES # BLD AUTO: 1.6 K/UL (ref 1–4.8)
LYMPHOCYTES NFR BLD: 32 % (ref 18–48)
MCH RBC QN AUTO: 29.2 PG (ref 27–31)
MCHC RBC AUTO-ENTMCNC: 33.3 G/DL (ref 32–36)
MCV RBC AUTO: 88 FL (ref 82–98)
MONOCYTES # BLD AUTO: 0.5 K/UL (ref 0.3–1)
MONOCYTES NFR BLD: 10.5 % (ref 4–15)
NEUTROPHILS # BLD AUTO: 2.7 K/UL (ref 1.8–7.7)
NEUTROPHILS NFR BLD: 55.3 % (ref 38–73)
NONHDLC SERPL-MCNC: 81 MG/DL
NRBC BLD-RTO: 0 /100 WBC
PLATELET # BLD AUTO: 237 K/UL (ref 150–450)
PMV BLD AUTO: 10.6 FL (ref 9.2–12.9)
POTASSIUM SERPL-SCNC: 4.5 MMOL/L (ref 3.5–5.1)
PROT SERPL-MCNC: 6.5 G/DL (ref 6–8.4)
RBC # BLD AUTO: 5.28 M/UL (ref 4.6–6.2)
SODIUM SERPL-SCNC: 137 MMOL/L (ref 136–145)
TRIGL SERPL-MCNC: 97 MG/DL (ref 30–150)
WBC # BLD AUTO: 4.94 K/UL (ref 3.9–12.7)

## 2023-05-01 PROCEDURE — 80061 LIPID PANEL: CPT | Performed by: INTERNAL MEDICINE

## 2023-05-01 PROCEDURE — 36415 COLL VENOUS BLD VENIPUNCTURE: CPT | Performed by: INTERNAL MEDICINE

## 2023-05-01 PROCEDURE — 85025 COMPLETE CBC W/AUTO DIFF WBC: CPT | Performed by: INTERNAL MEDICINE

## 2023-05-01 PROCEDURE — 80053 COMPREHEN METABOLIC PANEL: CPT | Performed by: INTERNAL MEDICINE

## 2023-05-03 ENCOUNTER — OFFICE VISIT (OUTPATIENT)
Dept: CARDIOLOGY | Facility: CLINIC | Age: 68
End: 2023-05-03
Payer: MEDICARE

## 2023-05-03 VITALS
HEIGHT: 67 IN | SYSTOLIC BLOOD PRESSURE: 146 MMHG | BODY MASS INDEX: 28.89 KG/M2 | WEIGHT: 184.06 LBS | HEART RATE: 52 BPM | OXYGEN SATURATION: 100 % | DIASTOLIC BLOOD PRESSURE: 86 MMHG

## 2023-05-03 DIAGNOSIS — E78.2 MIXED HYPERLIPIDEMIA: ICD-10-CM

## 2023-05-03 DIAGNOSIS — I63.412 ACUTE CEREBROVASCULAR ACCIDENT (CVA) DUE TO EMBOLISM OF LEFT MIDDLE CEREBRAL ARTERY: ICD-10-CM

## 2023-05-03 DIAGNOSIS — I63.9 STROKE: ICD-10-CM

## 2023-05-03 DIAGNOSIS — I10 PRIMARY HYPERTENSION: ICD-10-CM

## 2023-05-03 DIAGNOSIS — I42.2 APICAL VARIANT HYPERTROPHIC CARDIOMYOPATHY: Primary | ICD-10-CM

## 2023-05-03 PROCEDURE — 99214 OFFICE O/P EST MOD 30 MIN: CPT | Mod: S$PBB,,, | Performed by: INTERNAL MEDICINE

## 2023-05-03 PROCEDURE — 99999 PR PBB SHADOW E&M-EST. PATIENT-LVL IV: ICD-10-PCS | Mod: PBBFAC,,, | Performed by: INTERNAL MEDICINE

## 2023-05-03 PROCEDURE — 99214 PR OFFICE/OUTPT VISIT, EST, LEVL IV, 30-39 MIN: ICD-10-PCS | Mod: S$PBB,,, | Performed by: INTERNAL MEDICINE

## 2023-05-03 PROCEDURE — 99214 OFFICE O/P EST MOD 30 MIN: CPT | Mod: PBBFAC | Performed by: INTERNAL MEDICINE

## 2023-05-03 PROCEDURE — 99999 PR PBB SHADOW E&M-EST. PATIENT-LVL IV: CPT | Mod: PBBFAC,,, | Performed by: INTERNAL MEDICINE

## 2023-05-03 RX ORDER — ATORVASTATIN CALCIUM 20 MG/1
20 TABLET, FILM COATED ORAL NIGHTLY
Qty: 90 TABLET | Refills: 3 | Status: SHIPPED | OUTPATIENT
Start: 2023-05-03

## 2023-05-03 RX ORDER — CARVEDILOL 12.5 MG/1
12.5 TABLET ORAL 2 TIMES DAILY
Qty: 180 TABLET | Refills: 3 | Status: SHIPPED | OUTPATIENT
Start: 2023-05-03 | End: 2023-11-06 | Stop reason: SDUPTHER

## 2023-05-03 RX ORDER — TELMISARTAN 80 MG/1
80 TABLET ORAL NIGHTLY
Qty: 90 TABLET | Refills: 3 | Status: SHIPPED | OUTPATIENT
Start: 2023-05-03

## 2023-05-03 NOTE — PROGRESS NOTES
Subjective:   Chief Complaint: Establish Care and Follow-up  Last Clinic Visit: New Patient    History of Present Illness: Peter Munoz is a 67 y.o. 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  He was in his usual state of health when he developed sudden onset aphasia and right-sided weakness, was taken for emergent interventional thrombectomy.  Symptoms significantly improved, since discharge she does report some mild difficulty with vision at long distance, and some gait abnormalities, but otherwise doing well.  Is walking long distances on a regular basis has lost 20 lb, no jogging or aerobic activities yet.  Has not followed with vascular Neurology at, and ask about clearance needed for driving.  Compliant with atorvastatin and most recent LDL 61, lipids drawn during hospitalization with spurious hypertriglyceridemia.  During his hospitalization for CVA he was diagnosed with apical aneurysm, had previous diagnosis of apical hypertrophic cardiomyopathy for many years but aneurysm a new diagnosis.  He was started on Eliquis, tolerating well denies any bleeding.  Denies any dyspnea on exertion, no orthopnea, no lower extremity edema.  Blood pressure at home typically in the 130s systolic over 80s diastolic.  Denies any lightheadedness noted to have bradycardia in the 50s.  With apical aneurysm during hospitalization he had no significant arrhythmias or ectopy on telemetry.  Due for follow-up with Dr. Good.    Dx:  Embolic CVA 03/26/2023  Apical hypertrophic cardiomyopathy   Apical aneurysm   Hyperlipidemia   Hypertension    Medications:  Outpatient Encounter Medications as of 5/3/2023   Medication Sig Dispense Refill    ascorbic acid, vitamin C, (VITAMIN C) 1000 MG tablet 1 tablet.      buPROPion (WELLBUTRIN XL) 150 MG TB24 tablet Take 150 mg by mouth  "once daily.      cetirizine (ZYRTEC) 10 mg Cap 1 tablet.      FLUoxetine 10 MG capsule Take by mouth.      turmeric 400 mg Cap as directed      zinc gluconate 50 mg tablet 1 tablet.      [DISCONTINUED] apixaban (ELIQUIS) 5 mg Tab Take 1 tablet (5 mg total) by mouth 2 (two) times daily. 60 tablet 11    [DISCONTINUED] atorvastatin (LIPITOR) 20 MG tablet Take 20 mg by mouth.      [DISCONTINUED] carvediloL (COREG) 25 MG tablet Take 25 mg by mouth 2 (two) times daily.      [DISCONTINUED] telmisartan (MICARDIS) 80 MG Tab Take 80 mg by mouth.      apixaban (ELIQUIS) 5 mg Tab Take 1 tablet (5 mg total) by mouth 2 (two) times daily. 60 tablet 11    atorvastatin (LIPITOR) 20 MG tablet Take 1 tablet (20 mg total) by mouth every evening. 90 tablet 3    carvediloL (COREG) 12.5 MG tablet Take 1 tablet (12.5 mg total) by mouth 2 (two) times daily. 180 tablet 3    telmisartan (MICARDIS) 80 MG Tab Take 1 tablet (80 mg total) by mouth every evening. 90 tablet 3     No facility-administered encounter medications on file as of 5/3/2023.     Social History:  Peter reports that he has never smoked. He does not have any smokeless tobacco history on file. He reports current alcohol use.    Objective:   BP (!) 146/86 (BP Location: Left arm, Patient Position: Sitting, BP Method: Medium (Automatic))   Pulse (!) 52   Ht 5' 7" (1.702 m)   Wt 83.5 kg (184 lb 1.4 oz)   SpO2 100%   BMI 28.83 kg/m²     Physical Exam   Constitutional: He does not appear ill. No distress.   HENT:   Head: Normocephalic and atraumatic.   Mouth/Throat: Mucous membranes are moist.   Cardiovascular: Normal rate and normal pulses. Exam reveals no gallop and no friction rub.   Murmur (1/6 systolic) heard.  Bigeminy   Pulmonary/Chest: Effort normal and breath sounds normal. No stridor. No respiratory distress. He has no wheezes. He has no rhonchi. He has no rales. He exhibits no tenderness.   Abdominal: Normal appearance.   Musculoskeletal:      Right lower leg: No " edema.      Left lower leg: No edema.   Neurological: He is alert.   Skin: Skin is warm.      EKG:  My independent visualization of most recent EKG is normal sinus rhythm, diffuse abnormal ST segments and diffuse T-wave inversions consistent with apical HCM.    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 atypical takotsubo cardiomyopathy, suspect former.  Mild left atrial enlargement.  The left ventricle is normal in size with low normal systolic function.  The estimated ejection fraction is 50%.  Indeterminate left ventricular diastolic function.  There are segmental left ventricular wall motion abnormalities.  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  Intermediate central venous pressure (8 mmHg).  The estimated PA systolic pressure is 17 mmHg.  Mild tricuspid regurgitation.     Lipids:  Recent Labs   Lab 05/01/23  1005   LDL Cholesterol 61.6 L   HDL 31 L      Renal:  Recent Labs   Lab 05/01/23  1005   Creatinine 1.2   Potassium 4.5   CO2 29   BUN 15     Liver:  Recent Labs   Lab 05/01/23  1005   AST 16   ALT 22     Assessment:     1. Apical variant hypertrophic cardiomyopathy    2. Stroke    3. Primary hypertension    4. Mixed hyperlipidemia    5. Acute cerebrovascular accident (CVA) due to embolism of left middle cerebral artery      Plan:   1. Apical variant hypertrophic cardiomyopathy  NYHA class II, doing well euvolemic.  Some deconditioning secondary to hospitalization, encouraged him to push his activity level as much as he felt comfortable to do so.  From an arrhythmia standpoint he did not have any ectopy or significant arrhythmias on telemetry while inpatient, does have apical aneurysm, but no other evidence concerning substrate.  Will obtain cardiac MRI to aid in further risk stratification and defer to Dr. Good for formal evaluation.  - apixaban (ELIQUIS) 5 mg Tab; Take 1 tablet (5 mg total) by mouth 2  (two) times daily.  Dispense: 60 tablet; Refill: 11  - MRI Cardiac Morphology Function W WO; Future  - Cardiac MRI Morphology; Future    2. Stroke  Will discuss with vascular Neurology for post hospital follow-up, unsure if there is a formalized mechanism for driving clearance, suspect that some of his gait abnormalities may be related to CVA  - atorvastatin (LIPITOR) 20 MG tablet; Take 1 tablet (20 mg total) by mouth every evening.  Dispense: 90 tablet; Refill: 3  - apixaban (ELIQUIS) 5 mg Tab; Take 1 tablet (5 mg total) by mouth 2 (two) times daily.  Dispense: 60 tablet; Refill: 11  - MRI Cardiac Morphology Function W WO; Future  - Cardiac MRI Morphology; Future  - Ambulatory referral/consult to Vascular Neurology; Future    3. Primary hypertension  Blood pressure systolic slightly elevated today but he reports reliably 130s at home denies any 140s.  With his bradycardia will decrease carvedilol slightly to 12.5 continue telmisartan 80 encouraged him to continue checking blood pressure at home on a regular basis let us know if he has any consistently elevated numbers in the 140s.  - carvediloL (COREG) 12.5 MG tablet; Take 1 tablet (12.5 mg total) by mouth 2 (two) times daily.  Dispense: 180 tablet; Refill: 3  - telmisartan (MICARDIS) 80 MG Tab; Take 1 tablet (80 mg total) by mouth every evening.  Dispense: 90 tablet; Refill: 3    4. Mixed hyperlipidemia  CVA was embolic, but did have a history of hyperlipidemia, most recent LDL 61 continue statin at this time.    5. Acute cerebrovascular accident (CVA) due to embolism of left middle cerebral artery      Follow up in 3 6 months      Isael Brink MD [facility]

## 2023-05-03 NOTE — Clinical Note
Ketan,  You saw this paul in the hospital, apical HCM with aneurysm and embolic CVA.  Not complicated but would you guys be willing to see once for follow up?    Isael

## 2023-05-03 NOTE — Clinical Note
I think he missed his appointment with you but I encouraged him to follow-up.  I am going to try to get a cardiac MRI to further aid in risk stratification. -Isael

## 2023-05-08 ENCOUNTER — OFFICE VISIT (OUTPATIENT)
Dept: NEUROLOGY | Facility: CLINIC | Age: 68
End: 2023-05-08
Payer: MEDICARE

## 2023-05-08 VITALS
HEIGHT: 67 IN | BODY MASS INDEX: 28.68 KG/M2 | SYSTOLIC BLOOD PRESSURE: 144 MMHG | WEIGHT: 182.75 LBS | DIASTOLIC BLOOD PRESSURE: 92 MMHG | HEART RATE: 66 BPM

## 2023-05-08 DIAGNOSIS — I63.412 CEREBROVASCULAR ACCIDENT (CVA) DUE TO EMBOLISM OF LEFT MIDDLE CEREBRAL ARTERY: Primary | ICD-10-CM

## 2023-05-08 DIAGNOSIS — I63.412 ACUTE CEREBROVASCULAR ACCIDENT (CVA) DUE TO EMBOLISM OF LEFT MIDDLE CEREBRAL ARTERY: ICD-10-CM

## 2023-05-08 DIAGNOSIS — I42.2 APICAL VARIANT HYPERTROPHIC CARDIOMYOPATHY: ICD-10-CM

## 2023-05-08 DIAGNOSIS — I10 PRIMARY HYPERTENSION: ICD-10-CM

## 2023-05-08 DIAGNOSIS — I42.2 HYPERTROPHIC CARDIOMYOPATHY: ICD-10-CM

## 2023-05-08 DIAGNOSIS — E78.2 MIXED HYPERLIPIDEMIA: ICD-10-CM

## 2023-05-08 DIAGNOSIS — Z85.46 HISTORY OF MALIGNANT NEOPLASM OF PROSTATE: ICD-10-CM

## 2023-05-08 PROCEDURE — 99215 PR OFFICE/OUTPT VISIT, EST, LEVL V, 40-54 MIN: ICD-10-PCS | Mod: S$PBB,,, | Performed by: STUDENT IN AN ORGANIZED HEALTH CARE EDUCATION/TRAINING PROGRAM

## 2023-05-08 PROCEDURE — 99999 PR PBB SHADOW E&M-EST. PATIENT-LVL IV: ICD-10-PCS | Mod: PBBFAC,,, | Performed by: STUDENT IN AN ORGANIZED HEALTH CARE EDUCATION/TRAINING PROGRAM

## 2023-05-08 PROCEDURE — 99999 PR PBB SHADOW E&M-EST. PATIENT-LVL IV: CPT | Mod: PBBFAC,,, | Performed by: STUDENT IN AN ORGANIZED HEALTH CARE EDUCATION/TRAINING PROGRAM

## 2023-05-08 PROCEDURE — 99215 OFFICE O/P EST HI 40 MIN: CPT | Mod: S$PBB,,, | Performed by: STUDENT IN AN ORGANIZED HEALTH CARE EDUCATION/TRAINING PROGRAM

## 2023-05-08 PROCEDURE — 99214 OFFICE O/P EST MOD 30 MIN: CPT | Mod: PBBFAC | Performed by: STUDENT IN AN ORGANIZED HEALTH CARE EDUCATION/TRAINING PROGRAM

## 2023-05-08 NOTE — PROGRESS NOTES
"Vascular Neurology Clinic  Initial Consult    Patient Name: Peter Munoz  MRN: [id number]    CC:Embolic L MCA infarct     HPI: Peter Munoz is a 67 y.o. R-handed male w/ PMH significant for HTN, HLD, prostate CA (2022), Apical Aneurysm ( 2023), HOCM presenting as referral status-post hospital admission on 3/26/2023.   Presented w/ RSW and aphasia.  As per admission notes - On arrival to the ED the patient is awake.  He has global aphasia, right facial droop, left gaze preference, left hemianopsia.  VAN+.  NIHSS 24.  He was taken to CT for a CTA Stroke MP.  Imaging reviewed by Dr. Sarkar.  CTH negative, CTA positive for L M1 occlusion.  Discussed the findings with the patient and his wife.  Explained the risks and benefits of thrombolytic therapy with them.  The patient's wife agreed to thrombolytic therapy.  Assessment post thrombolytic therapy mildly improved.  The patient's gaze preference resolved and he had some vocalizations though incomprehensible.  He remained w/RSW, facial droop, and expressive/receptive aphasia.  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 Given echo findings, recommend cardiology consult to see if patient will need further inpatient w/u and starting patient on DOAC.     Discharged to  Home w/ NIHSS of 0, mrS of 1.  Has been walking everyday.    - But feels "old" all of a sudden    - Had a bad day a few days ago when he felt off balance   - Feels like he is at around 90-95%   - Memory is great    - Feels that he is less vocal and his voice is not as loud     - Feels like he has no word finding difficulties       Diagnostic Results:  MOCA 3/28/2023: 23/30, points lost for memory and delayed recall     Brain Imaging   MRI Brain WO 3/26/2023    Findings consistent with acute left MCA territory infarct " involving the left basal ganglia and left frontal insular cortex, as above.  No evidence of hemorrhagic conversion.     Vessel Imaging   IR angio 3/26/2023  Left M1 occlusion. Aspiration thrombectomy performed with restoration of TICI 3 flow after 2 passes. Ken Balloon Guide, 6 Fr Gail and 4 Max aspiration catheters were used.      CTA Stroke MP 3/26/2023  Acute left MCA stroke with left M1 occlusion.  Neurointerventional Radiology is aware of this finding and is proceeding to take the patient for thrombectomy.     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 atypical takotsubo cardiomyopathy, suspect former.  Mild left atrial enlargement.  The left ventricle is normal in size with low normal systolic function.  The estimated ejection fraction is 50%.  Indeterminate left ventricular diastolic function.  There are segmental left ventricular wall motion abnormalities.  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  Intermediate central venous pressure (8 mmHg).  The estimated PA systolic pressure is 17 mmHg.  Mild tricuspid regurgitation.    Relevant Lab work   Recent Labs   Lab 23  0143 23  1005   Hemoglobin A1C 5.2  --    LDL Cholesterol Invalid, Trig>400.0 61.6 L   HDL 26 L 31 L   Triglycerides 561 H 97   Cholesterol 174 112 L         NIH Stroke Scale:  Interval: baseline (upon arrival/admit)  Level of Consciousness: 0 - alert  LOC Questions: 0 - answers both correctly  LOC Commands: 0 - performs both correctly  Best Gaze: 0 - normal  Visual: 0 - no visual loss  Facial Palsy: 0 - normal  Motor Left Arm: 0 - no drift  Motor Right Arm: 0 - no drift  Motor Left Le - no drift  Motor Right Le - no drift  Limb Ataxia: 0 - absent  Sensory: 0 - normal  Best Language: 0 - no aphasia  Dysarthria: 0 - normal articulation  Extinction and Inattention: 0 - no neglect  NIH Stroke Scale Total: 0        Review of Systems:  General: No fevers, chills  Eyes: No changes in vision  ENT: No changes in hearing  Respiratory: No SOB  CV: No chest pain, palpitations  GI: No diarrhea, blood in stool  Urinary: No dysuria, hematuria  Skin: No rashes  Neurological: No weakness, confusion  Psychiatric: No auditory nor visual hallucinations      Past Medical History  Past Medical History:   Diagnosis Date    Cancer 2022    prostate    HLD (hyperlipidemia)     Hypertension        Medications    Current Outpatient Medications:     apixaban (ELIQUIS) 5 mg Tab, Take 1 tablet (5 mg total) by mouth 2 (two) times daily., Disp: 60 tablet, Rfl: 11    ascorbic acid, vitamin C, (VITAMIN C) 1000 MG tablet, 1 tablet., Disp: , Rfl:     atorvastatin (LIPITOR) 20 MG tablet, Take 1 tablet (20 mg total) by mouth every evening., Disp: 90 tablet, Rfl: 3    buPROPion (WELLBUTRIN XL) 150 MG TB24 tablet, Take 150 mg by mouth once daily., Disp: , Rfl:     carvediloL (COREG) 12.5 MG tablet, Take 1 tablet (12.5 mg total) by mouth 2 (two) times daily., Disp: 180 tablet, Rfl: 3    cetirizine (ZYRTEC) 10 mg Cap, 1 tablet., Disp: , Rfl:     FLUoxetine 10 MG capsule, Take by mouth., Disp: , Rfl:     telmisartan (MICARDIS) 80 MG Tab, Take 1 tablet (80 mg total) by mouth every evening., Disp: 90 tablet, Rfl: 3    turmeric 400 mg Cap, as directed, Disp: , Rfl:     zinc gluconate 50 mg tablet, 1 tablet., Disp: , Rfl:   Any other notable medications as documented in HPI    Allergies  Review of patient's allergies indicates:  No Known Allergies    Social History  Social History     Socioeconomic History    Marital status:    Tobacco Use    Smoking status: Never   Substance and Sexual Activity    Alcohol use: Yes     Social Determinants of Health     Financial Resource Strain: Low Risk     Difficulty of Paying Living Expenses: Not very hard   Food Insecurity: No Food Insecurity    Worried About Running Out of Food in the Last Year: Never true    Ran Out of  "Food in the Last Year: Never true   Transportation Needs: No Transportation Needs    Lack of Transportation (Medical): No    Lack of Transportation (Non-Medical): No   Physical Activity: Sufficiently Active    Days of Exercise per Week: 3 days    Minutes of Exercise per Session: 110 min   Social Connections: Unknown    Frequency of Communication with Friends and Family: More than three times a week    Frequency of Social Gatherings with Friends and Family: More than three times a week    Marital Status:    Housing Stability: Unknown    Unable to Pay for Housing in the Last Year: No    Unstable Housing in the Last Year: No     Any other notable Social History as documented in HPI.    Family History  Family History   Problem Relation Age of Onset    Hypertension Brother      Any other notable FMH as documented in HPI.    Physical Exam  BP (!) 144/92   Pulse 66   Ht 5' 7" (1.702 m)   Wt 82.9 kg (182 lb 12.2 oz)   BMI 28.62 kg/m²     General: Well-developed, well-groomed. No apparent distress  HENT: Normocephalic, atraumatic.    Cardiovascular: Regular rate and rhythm  Chest: No audible wheezes, stridor, ronchi appreciated.  Musculoskeletal: No peripheral edema    Neurologic Exam: The patient is awake, alert and oriented to person, place, time and situation. Attentive, vigilant during exam. Language is fluent. Naming & repetition intact, +2-step commands.  Fund of knowledge is appropriate. Well organized thoughts.      Cranial nerves:   CN II: Visual fields are full to confrontation. Pupils are 4 mm and briskly reactive to light.  CN III, IV, VI: EOMI, no nystagmus, no ptosis  CN V: Facial sensation is intact in all 3 divisions bilaterally.  CN VII: Face is symmetric with normal eye closure and smile.  CN VIII: Hearing is normal bilaterally  CN IX, X: Palate elevates symmetrically. Phonation is normal.  CN XI: Head turning and shoulder shrug are intact  CN XII: Tongue is midline with normal movements and no " "atrophy.    Motor examination of all extremities demonstrates normal bulk and tone in all four limbs. There are no atrophy or fasciculations.      Left Right   Left Right   Deltoid 5/5 5/5  Hip Flexion 5/5 5/5   Biceps 5/5 5/5  Hip Extension 5/5 5/5   Triceps 5/5 5/5  Knee Flexion 5/5 5/5   Wrist Ext 5/5 5/5  Knee Extension 5/5 5/5   Finger Abd 5/5 5/5  Ankle dorsiflex 5/5 5/5       Ankle plantar flex 5/5 5/5     R pronator drift   Sensory examination is normal light touch in BUE and BLE.      Deep tendon reflexes No clonus. Negative Barr's    Gait: Normal tandem, and casual gait.     Coordination: No dysmetria with finger-to-nose. Rapid alternating movements and fine finger movements are intact.     Assessment and Plan  - Continue Eliquis 5 mg BID  - Cardiac Event monitor ordered - should be sent to your house   - Wear it for 30 days and send it back (instruction attached)  - Continue Atorvastatin 20 mg daily  - No driving restrictions at this time   - Just start out slow and work your way up to normal    - Have a "zack" with you at first  - Follow up w/ Cardiology     Diagnosis/Etiology: L MCA infarct, CE  Stroke Risk Factors: HTN, HLD, prostate CA (2022), Apical Aneurysm ( 2023), HOCM      Recommendations:   Antiplatelet/Anticoagulation: Eliquis 5 mg BID  Lipid Management: Atorvastatin 20 mg QHS (long-term goal LDL < 70).   - Monitoring for liver dysfunction and myopathy is suggested for statins. To be addressed by PCP  Diabetes: Target hemoglobin A1c <7%, measured 2X/year or quarterly if not meeting goals  Hypertension: Long term goal is normotension w/ target BP of less than 130/80 mmHg  Sleep: Denies any symptoms of BARRETT. Consider Sleep Medicine follow up in the future if suspicion for BARRETT occurs.   Smoking: Goal is smoking cessation and encouragement not to start smoking in the future.   Diet: Discussed Mediterranean Diet recommendations (Adopted from Emmanuel et al, NE, 2018.)  - Eat primarily " plant-based foods, such as fruits and vegetables, whole grains, legumes (beans) and nuts  - Limit refined carbohydrates (white pasta, bread, rice).  - Replace butter with healthy fats such as olive oil.  - Use herbs and spices instead of salt to flavor foods.  - Limit red meat and processed meats to no more than a few times a month.  - Avoid sugary sodas, bakery goods, and sweets.  - Eat fish and poultry at least twice a week.  - Get plenty of exercise (150 minutes per week).    RTC in PRN    Problem List Items Addressed This Visit          Neuro    Acute cerebrovascular accident (CVA) due to embolism of left middle cerebral artery    Relevant Orders    Cardiac event monitor       Cardiac/Vascular    Hypertrophic cardiomyopathy    Primary hypertension    Mixed hyperlipidemia    Apical variant hypertrophic cardiomyopathy       Oncology    History of malignant neoplasm of prostate     Other Visit Diagnoses       Cerebrovascular accident (CVA) due to embolism of left middle cerebral artery    -  Primary                Racquel Lazcano MD  Vascular Neurology  [facility]  [address] [place] [address]

## 2023-05-08 NOTE — PATIENT INSTRUCTIONS
"- Continue Eliquis 5 mg BID  - Cardiac Event monitor ordered - should be sent to your house   - Wear it for 30 days and send it back (instruction attached)  - Continue Atorvastatin 20 mg daily  - No driving restrictions at this time   - Just start out slow and work your way up to normal    - Have a "zack" with you at first  - Follow up w/ Cardiology     "

## 2023-05-10 ENCOUNTER — CLINICAL SUPPORT (OUTPATIENT)
Dept: CARDIOLOGY | Facility: HOSPITAL | Age: 68
End: 2023-05-10
Attending: STUDENT IN AN ORGANIZED HEALTH CARE EDUCATION/TRAINING PROGRAM
Payer: MEDICARE

## 2023-05-10 DIAGNOSIS — I63.412 ACUTE CEREBROVASCULAR ACCIDENT (CVA) DUE TO EMBOLISM OF LEFT MIDDLE CEREBRAL ARTERY: ICD-10-CM

## 2023-05-10 DIAGNOSIS — I67.848 OTHER CEREBROVASCULAR VASOSPASM AND VASOCONSTRICTION: ICD-10-CM

## 2023-05-10 PROCEDURE — 93272 CARDIAC EVENT MONITOR (CUPID ONLY): ICD-10-PCS | Mod: ,,, | Performed by: INTERNAL MEDICINE

## 2023-05-10 PROCEDURE — 93272 ECG/REVIEW INTERPRET ONLY: CPT | Mod: ,,, | Performed by: INTERNAL MEDICINE

## 2023-05-10 PROCEDURE — 93270 REMOTE 30 DAY ECG REV/REPORT: CPT

## 2023-06-06 ENCOUNTER — HOME CARE VISIT (OUTPATIENT)
Dept: NEUROLOGY | Facility: HOSPITAL | Age: 68
End: 2023-06-06
Payer: MEDICARE

## 2023-06-06 VITALS
HEART RATE: 53 BPM | OXYGEN SATURATION: 99 % | SYSTOLIC BLOOD PRESSURE: 136 MMHG | WEIGHT: 179 LBS | BODY MASS INDEX: 28.04 KG/M2 | DIASTOLIC BLOOD PRESSURE: 78 MMHG

## 2023-06-06 NOTE — PROGRESS NOTES
Mr. Munoz denies any ER visits or hospital stays. SM nurse reinforced education on stroke RF present, stroke residual effects, and stroke recovery time-frame.

## 2023-06-08 DIAGNOSIS — R52 PAIN: Primary | ICD-10-CM

## 2023-06-09 ENCOUNTER — TELEPHONE (OUTPATIENT)
Dept: SPORTS MEDICINE | Facility: CLINIC | Age: 68
End: 2023-06-09
Payer: MEDICARE

## 2023-06-09 NOTE — TELEPHONE ENCOUNTER
Called the patient in regards to appointment with Bria Manrique PA-C on 6/15.      Purpose of call; to get more information regarding shoulder pain. Also, to inform the patient that his provider order shoulder x-ray which is scheduled at 1:00 pm and his 1:30 pm office visit with Burton will follow. The patient can call the office at [phone] to discuss.

## 2023-06-15 ENCOUNTER — OFFICE VISIT (OUTPATIENT)
Dept: SPORTS MEDICINE | Facility: CLINIC | Age: 68
End: 2023-06-15
Payer: MEDICARE

## 2023-06-15 ENCOUNTER — HOSPITAL ENCOUNTER (OUTPATIENT)
Dept: RADIOLOGY | Facility: HOSPITAL | Age: 68
Discharge: HOME | End: 2023-06-15
Attending: PHYSICIAN ASSISTANT
Payer: MEDICARE

## 2023-06-15 VITALS
DIASTOLIC BLOOD PRESSURE: 89 MMHG | BODY MASS INDEX: 28.3 KG/M2 | HEART RATE: 47 BPM | SYSTOLIC BLOOD PRESSURE: 129 MMHG | WEIGHT: 180.31 LBS | HEIGHT: 67 IN

## 2023-06-15 DIAGNOSIS — M25.511 CHRONIC RIGHT SHOULDER PAIN: Primary | ICD-10-CM

## 2023-06-15 DIAGNOSIS — M75.81 ROTATOR CUFF TENDONITIS, RIGHT: ICD-10-CM

## 2023-06-15 DIAGNOSIS — Z86.73 HISTORY OF CVA (CEREBROVASCULAR ACCIDENT): ICD-10-CM

## 2023-06-15 DIAGNOSIS — M25.811 SHOULDER IMPINGEMENT, RIGHT: ICD-10-CM

## 2023-06-15 DIAGNOSIS — R52 PAIN: ICD-10-CM

## 2023-06-15 DIAGNOSIS — G89.29 CHRONIC RIGHT SHOULDER PAIN: Primary | ICD-10-CM

## 2023-06-15 PROCEDURE — 99999 PR PBB SHADOW E&M-EST. PATIENT-LVL III: ICD-10-PCS | Mod: PBBFAC,,, | Performed by: PHYSICIAN ASSISTANT

## 2023-06-15 PROCEDURE — 99213 OFFICE O/P EST LOW 20 MIN: CPT | Mod: PBBFAC,PN | Performed by: PHYSICIAN ASSISTANT

## 2023-06-15 PROCEDURE — 99214 PR OFFICE/OUTPT VISIT, EST, LEVL IV, 30-39 MIN: ICD-10-PCS | Mod: S$PBB,,, | Performed by: PHYSICIAN ASSISTANT

## 2023-06-15 PROCEDURE — 99999 PR PBB SHADOW E&M-EST. PATIENT-LVL III: CPT | Mod: PBBFAC,,, | Performed by: PHYSICIAN ASSISTANT

## 2023-06-15 PROCEDURE — 73030 X-RAY EXAM OF SHOULDER: CPT | Mod: TC,PN,RT

## 2023-06-15 PROCEDURE — 99214 OFFICE O/P EST MOD 30 MIN: CPT | Mod: S$PBB,,, | Performed by: PHYSICIAN ASSISTANT

## 2023-06-15 PROCEDURE — 73030 X-RAY EXAM OF SHOULDER: CPT | Mod: 26,RT,, | Performed by: RADIOLOGY

## 2023-06-15 PROCEDURE — 73030 XR SHOULDER COMPLETE 2 OR MORE VIEWS RIGHT: ICD-10-PCS | Mod: 26,RT,, | Performed by: RADIOLOGY

## 2023-06-15 NOTE — PROGRESS NOTES
Subjective:     Chief Complaint: Peter Munoz is a 67 y.o. male who had concerns including Pain of the Right Shoulder.    Patient who is RHD presents to clinic with right shoulder pain x 1-2 years.  Denies a specific VIVIANE. He has noticed a decrease in his ROM and strength of his right shoulder. Pain increases with reaching out and behind him. Denies a previous history of injections or surgeries. He recently had a CVA in March 2023 affecting his right side. However, he reports 95 % return of function to his right side. He has never seen anyone for this right shoulder pain before and has never completed any formal PT. He is now taking Eliquis post CVA.  Pain at rest is 0/10. Only reports slight discomfort with specific movements. He is here today to discuss treatment options.      Review of Systems   Constitutional: Negative. Negative for chills, fever, weight gain and weight loss.   HENT:  Negative for congestion and sore throat.    Eyes:  Negative for blurred vision and double vision.   Cardiovascular:  Negative for chest pain, leg swelling and palpitations.   Respiratory:  Negative for cough and shortness of breath.    Hematologic/Lymphatic: Does not bruise/bleed easily.   Skin:  Negative for itching, poor wound healing and rash.   Musculoskeletal:  Positive for joint pain. Negative for back pain, joint swelling, muscle weakness, myalgias and stiffness.   Gastrointestinal:  Negative for abdominal pain, constipation, diarrhea, nausea and vomiting.   Genitourinary: Negative.  Negative for frequency and hematuria.   Neurological:  Negative for dizziness, headaches, numbness, paresthesias and sensory change.   Psychiatric/Behavioral:  Negative for altered mental status and depression. The patient is not nervous/anxious.    Allergic/Immunologic: Negative for hives.               Objective:     General: Peter is well-developed, well-nourished, appears stated age, in no acute distress, alert and oriented to time, place  and person.     General    Vitals reviewed.  Constitutional: He is oriented to person, place, and time. He appears well-developed and well-nourished. No distress.   HENT:   Head: Normocephalic.   Eyes: EOM are normal.   Cardiovascular:  Normal rate and regular rhythm.            Pulmonary/Chest: Effort normal. No respiratory distress.   Neurological: He is alert and oriented to person, place, and time. He has normal reflexes. No cranial nerve deficit. Coordination normal.   Psychiatric: He has a normal mood and affect. His behavior is normal. Judgment and thought content normal.         Right Shoulder Exam     Inspection/Observation   Swelling: absent  Bruising: absent  Scars: absent  Deformity: absent  Scapular Winging: absent  Scapular Dyskinesia: negative  Atrophy: absent    Tenderness   The patient is experiencing no tenderness.    Range of Motion   Active abduction:  160 normal   Passive abduction:  160 normal   Extension:  30 normal   Forward Flexion:  170 normal   Adduction: 60 normal  External Rotation 0 degrees:  70 normal   External Rotation 90 degrees: 90 normal  Internal rotation 0 degrees:  T8 normal   Internal rotation 90 degrees:  70 normal     Tests & Signs   Apprehension: negative  Cross arm: negative  Drop arm: negative  Key test: negative  Impingement: negative  Sulcus: absent  Lift Off Sign: negative  Active Compression Test (Lowndes's Sign): negative  Yergason's Test: negative  Speed's Test: positive  Anterior Drawer Test: 1+   Posterior Drawer Test: 1+    Other   Sensation: normal    Left Shoulder Exam     Inspection/Observation   Swelling: absent  Bruising: absent  Scars: absent  Deformity: absent  Scapular Winging: absent  Scapular Dyskinesia: negative  Atrophy: absent    Tenderness   The patient is experiencing no tenderness.     Range of Motion   Active abduction:  160 normal   Passive abduction:  160 normal   Extension:  30 normal   Forward Flexion:  180 normal   Adduction: 60  normal  External Rotation 0 degrees:  70 normal   External Rotation 90 degrees: 90 normal  Internal rotation 0 degrees:  T8 normal   Internal rotation 90 degrees:  70 normal     Muscle Strength   The patient has normal left shoulder strength.    Tests & Signs   Apprehension: negative  Cross arm: negative  Drop arm: negative  Key test: negative  Impingement: negative  Sulcus: absent  Lift Off Sign: negative  Active Compression test (Medina's Sign): negative  Yergasons's Test: negative  Speed's Test: negative  Anterior Drawer Test: 1+  Posterior Drawer Test: 1+    Other   Sensation: normal       Muscle Strength   Right Upper Extremity   Shoulder Abduction: 4/5 (4+ pain)   Shoulder Internal Rotation: 5/5   Shoulder External Rotation: 5/5   Supraspinatus: 4/5 (4+)   Subscapularis: 5/5   Biceps: 5/5   Left Upper Extremity  Shoulder Abduction: 5/5   Shoulder Internal Rotation: 5/5   Shoulder External Rotation: 5/5   Supraspinatus: 5/5   Subscapularis: 5/5   Biceps: 5/5     Reflexes     Left Side  Biceps:  2+  Triceps:  2+  Brachioradialis:  2+    Right Side   Biceps:  2+  Triceps:  2+  Brachioradialis:  2+    RADIOGRAPHS: 6/15/23  Right shoulder:      Assessment:     Encounter Diagnoses   Name Primary?    Chronic right shoulder pain Yes    Shoulder impingement, right     Rotator cuff tendonitis, right     History of CVA (cerebrovascular accident)         Plan:     We have discussed a variety of treatment options including medications, injections, physical therapy and other alternative treatments. I also explained the indications, risks and benefits of surgery. Patient chooses to proceed with physical therapy at this time. Possible CSI in future.    I made the decision to obtain old records of the patient including previous notes and imaging. I independently reviewed and interpreted lab results today as well as prior imaging.     OTC Tylenol as needed. NSAIDs contraindicated due to taking Eliquis.  2.  Ice compress to  the affected area 2-3x a day for 15-20 minutes as needed for pain management.  3.  Ambulatory referral to physical therapy for periscapular/rotator cuff strengthening at [facility]  4. Consider CSI vs MRI in future if continued pain and failure of PT.  5. RTC to see Bria Manrique PA-C in 8 weeks for follow-up.    All of the patient's questions were answered and the patient will contact us if they have any questions or concerns in the interim.        Patient questionnaires may have been collected.

## 2023-06-26 PROBLEM — I63.412 ACUTE CEREBROVASCULAR ACCIDENT (CVA) DUE TO EMBOLISM OF LEFT MIDDLE CEREBRAL ARTERY: Status: RESOLVED | Noted: 2023-03-26 | Resolved: 2023-06-26

## 2023-07-06 ENCOUNTER — HOME CARE VISIT (OUTPATIENT)
Dept: NEUROLOGY | Facility: HOSPITAL | Age: 68
End: 2023-07-06
Payer: MEDICARE

## 2023-07-06 VITALS
OXYGEN SATURATION: 99 % | BODY MASS INDEX: 27.82 KG/M2 | HEART RATE: 78 BPM | DIASTOLIC BLOOD PRESSURE: 92 MMHG | SYSTOLIC BLOOD PRESSURE: 144 MMHG | WEIGHT: 177.63 LBS

## 2023-07-06 NOTE — PROGRESS NOTES
Mr. Munoz VS are WNL on today's visit. He denies [facility] ER visits or hospital stays. Recent trip to the [facility] was without any issues. Home B/P readings 132-129 sys 78-90 dys. SM nurse educated patient and caregiver on sleep routine, and methods to use while eating out.

## 2023-08-08 ENCOUNTER — HOME CARE VISIT (OUTPATIENT)
Dept: NEUROLOGY | Facility: HOSPITAL | Age: 68
End: 2023-08-08
Payer: MEDICARE

## 2023-09-11 ENCOUNTER — TELEPHONE (OUTPATIENT)
Dept: RADIOLOGY | Facility: HOSPITAL | Age: 68
End: 2023-09-11
Payer: MEDICARE

## 2023-09-11 NOTE — TELEPHONE ENCOUNTER
Called Mr. Munoz to remind him of his Cardiac MRI on 9/12/2023 at 7:00 am.  Patient verified he does not have a pacemaker or defibrillator. I reminded him of location, [facility], [address].  Patient said he will be there.

## 2023-09-12 ENCOUNTER — HOSPITAL ENCOUNTER (OUTPATIENT)
Dept: RADIOLOGY | Facility: HOSPITAL | Age: 68
Discharge: HOME | End: 2023-09-12
Attending: INTERNAL MEDICINE
Payer: MEDICARE

## 2023-09-12 DIAGNOSIS — I42.2 APICAL VARIANT HYPERTROPHIC CARDIOMYOPATHY: ICD-10-CM

## 2023-09-12 DIAGNOSIS — I63.9 STROKE: ICD-10-CM

## 2023-09-12 PROCEDURE — 75561 MRI CARDIAC MORPHOLOGY FUNCTION W WO: ICD-10-PCS | Mod: 26,,, | Performed by: RADIOLOGY

## 2023-09-12 PROCEDURE — 75561 CARDIAC MRI FOR MORPH W/DYE: CPT | Mod: 26,,, | Performed by: RADIOLOGY

## 2023-09-12 PROCEDURE — 75561 CARDIAC MRI FOR MORPH W/DYE: CPT | Mod: TC

## 2023-09-12 PROCEDURE — 25500020 PHARM REV CODE 255: Performed by: INTERNAL MEDICINE

## 2023-09-12 PROCEDURE — A9577 INJ MULTIHANCE: HCPCS | Performed by: INTERNAL MEDICINE

## 2023-09-12 PROCEDURE — 75561 CARDIAC MRI FOR MORPH W/DYE: CPT | Mod: 26,,, | Performed by: INTERNAL MEDICINE

## 2023-09-12 PROCEDURE — 75561 CV CARDIAC MRI MORPHOLOGY (CUPID ONLY): ICD-10-PCS | Mod: 26,,, | Performed by: INTERNAL MEDICINE

## 2023-09-12 RX ADMIN — GADOBENATE DIMEGLUMINE 20 ML: 529 INJECTION, SOLUTION INTRAVENOUS at 08:09

## 2023-10-10 NOTE — PROGRESS NOTES
Called patient, explained cardiac MRI without any late gadolinium enhancement, a relatively reassuring finding on top of the event monitor without any concerning arrhythmias.  Will continue to discuss the indication for defibrillator therapy however these findings are relatively reassuring at this time.

## 2023-11-06 ENCOUNTER — OFFICE VISIT (OUTPATIENT)
Dept: CARDIOLOGY | Facility: CLINIC | Age: 68
End: 2023-11-06
Payer: MEDICARE

## 2023-11-06 VITALS
HEART RATE: 72 BPM | WEIGHT: 186.06 LBS | BODY MASS INDEX: 29.2 KG/M2 | SYSTOLIC BLOOD PRESSURE: 165 MMHG | HEIGHT: 67 IN | DIASTOLIC BLOOD PRESSURE: 88 MMHG

## 2023-11-06 DIAGNOSIS — I42.2 HYPERTROPHIC CARDIOMYOPATHY: ICD-10-CM

## 2023-11-06 DIAGNOSIS — I42.2 ANEURYSM OF APEX OF HEART DUE TO APICAL HYPERTROPHIC CARDIOMYOPATHY: Primary | ICD-10-CM

## 2023-11-06 DIAGNOSIS — I49.8 OTHER SPECIFIED CARDIAC ARRHYTHMIAS: Primary | ICD-10-CM

## 2023-11-06 DIAGNOSIS — I42.2 APICAL VARIANT HYPERTROPHIC CARDIOMYOPATHY: ICD-10-CM

## 2023-11-06 DIAGNOSIS — I25.3 ANEURYSM OF APEX OF HEART DUE TO APICAL HYPERTROPHIC CARDIOMYOPATHY: Primary | ICD-10-CM

## 2023-11-06 DIAGNOSIS — E78.2 MIXED HYPERLIPIDEMIA: ICD-10-CM

## 2023-11-06 DIAGNOSIS — I10 PRIMARY HYPERTENSION: ICD-10-CM

## 2023-11-06 PROCEDURE — 99214 PR OFFICE/OUTPT VISIT, EST, LEVL IV, 30-39 MIN: ICD-10-PCS | Mod: S$PBB,,, | Performed by: INTERNAL MEDICINE

## 2023-11-06 PROCEDURE — 99999 PR PBB SHADOW E&M-EST. PATIENT-LVL III: CPT | Mod: PBBFAC,,, | Performed by: INTERNAL MEDICINE

## 2023-11-06 PROCEDURE — 99999 PR PBB SHADOW E&M-EST. PATIENT-LVL III: ICD-10-PCS | Mod: PBBFAC,,, | Performed by: INTERNAL MEDICINE

## 2023-11-06 PROCEDURE — 99214 OFFICE O/P EST MOD 30 MIN: CPT | Mod: S$PBB,,, | Performed by: INTERNAL MEDICINE

## 2023-11-06 PROCEDURE — 99213 OFFICE O/P EST LOW 20 MIN: CPT | Mod: PBBFAC | Performed by: INTERNAL MEDICINE

## 2023-11-06 RX ORDER — CARVEDILOL 25 MG/1
25 TABLET ORAL 2 TIMES DAILY
Qty: 180 TABLET | Refills: 3 | Status: SHIPPED | OUTPATIENT
Start: 2023-11-06

## 2023-11-06 NOTE — Clinical Note
This paul missed his last appt with you to discuss +/- ICD, hopefully he makes the next one. Currently scheduled in 3 weeks.  I talked in depth with him about lack of robust data, potential benefit, current guideline recommendations, etc., but left it open ended for your thoughts and input as well.  Isael

## 2023-11-06 NOTE — PROGRESS NOTES
Subjective:   Chief Complaint: Hypertension  Last Clinic Visit: 5/3/2023     History of Present Illness: Peter Munoz is a 68 y.o. 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    Interval History:  Since we saw him last we obtained event monitor which showed PACs, PVCs, but no pathological arrhythmias.  We obtained a cardiac MRI which confirmed presence of apical aneurysm however no significant late gadolinium hyperenhancement, maximum wall thickness 22 mm.  Blood pressure elevated today, he checks at home and reports does occasionally run in the 140s over 90s, compliant with antihypertensives carvedilol 12.5 b.i.d. and telmisartan 80 mg.  He does endorse occasional salty meals however attempts to watch his diet somewhat.  Exercises walking 2-3 miles daily denies any significant chest discomfort, no dyspnea on exertion, no shortness of breath, no orthopnea, no lower extremity edema, no syncope, no presyncope, no palpitations.  Compliant with atorvastatin most recent LDL checked 6 months prior 61.  Occasional scrapes and bruises from apixaban but otherwise tolerating well.  No additional focal neurological deficits    5/3/2023  He was in his usual state of health when he developed sudden onset aphasia and right-sided weakness, was taken for emergent interventional thrombectomy.  Symptoms significantly improved, since discharge she does report some mild difficulty with vision at long distance, and some gait abnormalities, but otherwise doing well.  Is walking long distances on a regular basis has lost 20 lb, no jogging or aerobic activities yet.  Has not followed with vascular Neurology at, and ask about clearance needed for driving.  Compliant with atorvastatin and most recent LDL 61, lipids drawn during hospitalization with spurious hypertriglyceridemia.   "During his hospitalization for CVA he was diagnosed with apical aneurysm, had previous diagnosis of apical hypertrophic cardiomyopathy for many years but aneurysm a new diagnosis.  He was started on Eliquis, tolerating well denies any bleeding.  Denies any dyspnea on exertion, no orthopnea, no lower extremity edema.  Blood pressure at home typically in the 130s systolic over 80s diastolic.  Denies any lightheadedness noted to have bradycardia in the 50s.  With apical aneurysm during hospitalization he had no significant arrhythmias or ectopy on telemetry.  Due for follow-up with Dr. Good.    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 60 tablet 11    ascorbic acid, vitamin C, (VITAMIN C) 1000 MG tablet 1 tablet.      atorvastatin (LIPITOR) 20 MG tablet Take 1 tablet (20 mg total) by mouth every evening. 90 tablet 3    buPROPion (WELLBUTRIN XL) 150 MG TB24 tablet Take 150 mg by mouth once daily.      carvediloL (COREG) 25 MG tablet Take 1 tablet (25 mg total) by mouth 2 (two) times daily. 180 tablet 3    cetirizine (ZYRTEC) 10 mg Cap 1 tablet.      FLUoxetine 10 MG capsule Take by mouth.      telmisartan (MICARDIS) 80 MG Tab Take 1 tablet (80 mg total) by mouth every evening. 90 tablet 3    turmeric 400 mg Cap as directed      zinc gluconate 50 mg tablet 1 tablet.      [DISCONTINUED] carvediloL (COREG) 12.5 MG tablet Take 1 tablet (12.5 mg total) by mouth 2 (two) times daily. 180 tablet 3     No facility-administered encounter medications on file as of 11/6/2023.     Social History:  Peter reports that he has never smoked. He does not have any smokeless tobacco history on file. He reports current alcohol use.    Objective:   BP (!) 165/88   Pulse 72   Ht 5' 7" " (1.702 m)   Wt 84.4 kg (186 lb 1.1 oz)   BMI 29.14 kg/m²     Physical Exam   Constitutional: He does not appear ill. No distress.   HENT:   Head: Normocephalic and atraumatic.   Mouth/Throat: Mucous membranes are moist.   Cardiovascular: Normal rate and normal pulses. Exam reveals no gallop and no friction rub.   No murmur heard.  Bigeminy   Pulmonary/Chest: Effort normal and breath sounds normal. No stridor. No respiratory distress. He has no wheezes. He has no rhonchi. He has no rales. He exhibits no tenderness.   Abdominal: Normal appearance.   Musculoskeletal:      Right lower leg: No edema.      Left lower leg: No edema.   Neurological: He is alert.   Skin: Skin is warm.        EKG:  My independent visualization of most recent EKG is normal sinus rhythm, diffuse abnormal ST segments and diffuse T-wave inversions consistent with apical HCM.    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 atypical takotsubo cardiomyopathy, suspect former.  Mild left atrial enlargement.  The left ventricle is normal in size with low normal systolic function.  The estimated ejection fraction is 50%.  Indeterminate left ventricular diastolic function.  There are segmental left ventricular wall motion abnormalities.  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  Intermediate central venous pressure (8 mmHg).  The estimated PA systolic pressure is 17 mmHg.  Mild tricuspid regurgitation.     Lipids:  Recent Labs   Lab 05/01/23  1005   LDL Cholesterol 61.6 L   HDL 31 L        Renal:  Recent Labs   Lab 05/01/23  1005   Creatinine 1.2   Potassium 4.5   CO2 29   BUN 15       Liver:  Recent Labs   Lab 05/01/23  1005   AST 16   ALT 22       Event monitor   06/12/2023   At baseline, in the absence of symptoms, the rhythm was sinus at over the course of the month there were many activations, though no symptom was selected/reported.  Each showed sinus  rhythm, sometimes with PVCs and PACs, sometimes atrial bigeminy. Heart rates ranged between 44 and 74 beats per minute.    Cardiac MRI  09/12/2023   Conclusion:    LV volumes are normal. The LV mass distribution and contraction pattern is consistent with apical HOCM. The maximum thickness of hypertrophy is 22mm on the SAX. A small aneurysm is present in the apex. LVEF = 60%.   RV end diastolic volume is decreased. RVEF = 54%.  Left atrial enlargement  No LGE appreciated     Assessment:     1. 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 Apical variant hypertrophic cardiomyopathy    3. Hypertrophic cardiomyopathy    4. Primary hypertension    5. Mixed hyperlipidemia      Plan:   1. 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  The overall mechanism is somewhat unclear, presumed to be at least partially related to supply demand mismatch in the setting of elevated gradient and intracardiac pressures.  Reassuring that he did not have any sustained ventricular arrhythmias on event monitor, along with no large quantity of late gadolinium enhancement on cardiac MRI however these were not completely sensitive for ruling out future adverse events.  Discuss the relative paucity of literature on the subject.   Also explained there was no relationship between risk of sudden cardiac death from arrhythmia, and risk of future thromboembolic events.  Explained class 2 indications for ICD among major guidelines given increased risk however lack of robust data, many of those with SCD or aborted SCD from large study in 2017 did not have any prodromal late gadolinium enhancement, or heart failure symptoms.    Frederic EJ, Chloé BJ, Keshav TS, Jaimie RF, Adrian W, Eliecer MS, Chloé BURRIS.  Hypertrophic Cardiomyopathy With Left Ventricular Apical Aneurysm: Implications for Risk Stratification and Management. J Am Royce Cardiol. 2017 Feb 21;69(7):761-773. doi: 10.1016/j.jacc.2016.11.063. Erratum in: J Am Royce Cardiol. 2017 Mar 28;69(12):1652.    Will have him discuss with Dr. Good as well for his thoughts on ICD implantation.    2. Apical variant hypertrophic cardiomyopathy  Continue Eliquis, definitely, okay to hold for elective procedures as needed.  - Ambulatory referral/consult to Cardiac Electrophysiology; Future    3. Hypertrophic cardiomyopathy    4. Primary hypertension  Blood pressure elevated, explained pathophysiology of blood pressure and risk of aneurysm progression will start with uptitration of carvedilol to 25, continue telmisartan 80, if blood pressure continues to remain elevated, he will check at home, would recommend addition of thiazide diuretic to telmisartan could be included in a combination based pill.  - carvediloL (COREG) 25 MG tablet; Take 1 tablet (25 mg total) by mouth 2 (two) times daily.  Dispense: 180 tablet; Refill: 3    5. Mixed hyperlipidemia  Well-controlled primary prevention continue statin    Follow up in 1 year      Isael Brink MD [facility]

## 2023-11-21 ENCOUNTER — HOSPITAL ENCOUNTER (OUTPATIENT)
Dept: CARDIOLOGY | Facility: CLINIC | Age: 68
Discharge: HOME | End: 2023-11-21
Payer: MEDICARE

## 2023-11-21 ENCOUNTER — OFFICE VISIT (OUTPATIENT)
Dept: ELECTROPHYSIOLOGY | Facility: CLINIC | Age: 68
End: 2023-11-21
Payer: MEDICARE

## 2023-11-21 ENCOUNTER — PATIENT MESSAGE (OUTPATIENT)
Dept: ELECTROPHYSIOLOGY | Facility: CLINIC | Age: 68
End: 2023-11-21

## 2023-11-21 VITALS
WEIGHT: 181 LBS | DIASTOLIC BLOOD PRESSURE: 89 MMHG | HEART RATE: 63 BPM | SYSTOLIC BLOOD PRESSURE: 139 MMHG | HEIGHT: 67 IN | BODY MASS INDEX: 28.41 KG/M2

## 2023-11-21 DIAGNOSIS — I63.412 CEREBROVASCULAR ACCIDENT (CVA) DUE TO EMBOLISM OF LEFT MIDDLE CEREBRAL ARTERY: ICD-10-CM

## 2023-11-21 DIAGNOSIS — I42.2 HYPERTROPHIC CARDIOMYOPATHY: Primary | ICD-10-CM

## 2023-11-21 DIAGNOSIS — I42.2 APICAL VARIANT HYPERTROPHIC CARDIOMYOPATHY: ICD-10-CM

## 2023-11-21 DIAGNOSIS — I49.8 OTHER SPECIFIED CARDIAC ARRHYTHMIAS: ICD-10-CM

## 2023-11-21 PROCEDURE — 93010 EKG 12-LEAD: ICD-10-PCS | Mod: S$PBB,,, | Performed by: INTERNAL MEDICINE

## 2023-11-21 PROCEDURE — 99215 PR OFFICE/OUTPT VISIT, EST, LEVL V, 40-54 MIN: ICD-10-PCS | Mod: S$PBB,,, | Performed by: INTERNAL MEDICINE

## 2023-11-21 PROCEDURE — 99215 OFFICE O/P EST HI 40 MIN: CPT | Mod: S$PBB,,, | Performed by: INTERNAL MEDICINE

## 2023-11-21 PROCEDURE — 99999 PR PBB SHADOW E&M-EST. PATIENT-LVL IV: ICD-10-PCS | Mod: PBBFAC,,, | Performed by: INTERNAL MEDICINE

## 2023-11-21 PROCEDURE — 93010 ELECTROCARDIOGRAM REPORT: CPT | Mod: S$PBB,,, | Performed by: INTERNAL MEDICINE

## 2023-11-21 PROCEDURE — 99214 OFFICE O/P EST MOD 30 MIN: CPT | Mod: PBBFAC | Performed by: INTERNAL MEDICINE

## 2023-11-21 PROCEDURE — 93005 ELECTROCARDIOGRAM TRACING: CPT | Mod: PBBFAC | Performed by: INTERNAL MEDICINE

## 2023-11-21 PROCEDURE — 99999 PR PBB SHADOW E&M-EST. PATIENT-LVL IV: CPT | Mod: PBBFAC,,, | Performed by: INTERNAL MEDICINE

## 2023-11-21 NOTE — PROGRESS NOTES
Subjective:   Patient ID:  Peter Munoz is a 68 y.o. male who presents for follow-up of Cardiomyopathy    Primary Cardiologist: Isael Brink MD    HPI  I had the pleasure of seeing Mr. Munoz today in our electrophysiology clinic in follow-up for his risk of sudden cardiac death. As you are aware he is a pleasant 68 year-old man with hypertension, prostate CA and apical hypertrophic CMP with apical aneurysm and related embolic stroke. He was admitted with a stroke in 2023, manifested by right sided weakness and dysarthria. He received TNK. CTA showed left M1 occlusion and he underwent thrombectomy. Symptoms improved. ECHO 3/26 showed LV c/f apical hypertrophic CM w/ apical pseudoaneurysm, EF 50%, and segmental LV wall motion abnormalities. He had no history of syncope. Reports an uncle may have  of a heart attack in his 50s. All 3 brothers have HOCM. His last angiogram was in  when diagnosed with HOCM but was told he had clean coronaries. Cardiology consulted for management and possible AC therapy. EP was consulted for consideration of ICD in a patient with HOCM and apical aneurysm. He and I had a long conversation. He elected to think about ICD. He cancelled follow-up with me post-discharge.    Interim Hx:  Mr. Munoz returns for follow-up discussion regarding ICD implantation. Recent MRI noted apical HCM and an apical aneurysm. No LGE.    My interpretation of today's ECG sinus rhythm with PACs and LVH    Review of Systems   Constitutional: Negative for fever and malaise/fatigue.   HENT:  Negative for congestion and sore throat.    Eyes:  Negative for blurred vision and visual disturbance.   Cardiovascular:  Negative for chest pain, dyspnea on exertion, irregular heartbeat, near-syncope, palpitations and syncope.   Respiratory:  Negative for cough and shortness of breath.    Hematologic/Lymphatic: Negative for bleeding problem. Does not bruise/bleed easily.   Skin: Negative.     Musculoskeletal: Negative.    Gastrointestinal:  Negative for bloating, abdominal pain, hematochezia and melena.   Neurological:  Negative for focal weakness and weakness.   Psychiatric/Behavioral: Negative.         Objective:   Physical Exam  Vitals reviewed.   Constitutional:       General: He is not in acute distress.     Appearance: He is well-developed. He is not diaphoretic.   HENT:      Head: Normocephalic and atraumatic.   Eyes:      General:         Right eye: No discharge.         Left eye: No discharge.      Conjunctiva/sclera: Conjunctivae normal.   Cardiovascular:      Rate and Rhythm: Normal rate and regular rhythm.      Heart sounds: No murmur heard.     No friction rub. No gallop.   Pulmonary:      Effort: Pulmonary effort is normal. No respiratory distress.      Breath sounds: Normal breath sounds. No wheezing or rales.   Abdominal:      General: Bowel sounds are normal. There is no distension.      Palpations: Abdomen is soft.      Tenderness: There is no abdominal tenderness.   Musculoskeletal:      Cervical back: Neck supple.   Skin:     General: Skin is warm and dry.   Neurological:      Mental Status: He is alert and oriented to person, place, and time.   Psychiatric:         Behavior: Behavior normal.         Thought Content: Thought content normal.         Judgment: Judgment normal.         Assessment:      1. Hypertrophic cardiomyopathy    2. Apical variant hypertrophic cardiomyopathy    3. Stroke        Plan:     In summary, Mr. 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 We discussed the etiology and pathophysiology of sudden cardiac death in a patient population such as his and how an ICD may provide mortality benefit. We discussed the long-term implications of device implantation including infection risk, inappropriate shocks, and device/lead malfunction requiring further procedures.  We discussed the alternatives, benefits and risks of the procedure including pain, infection, bleeding, injury to lung causing pneumothorax requiring tube placement, injury to heart valves, puncture of the heart leading to pericardial effusion or tamponade requiring tube drainage, heart attack, stroke and death. All questions answered. He agrees to proceed.    Plan  Single chamber ICD implant, Biotronic DX lead  Anesthesia  Hold eliquis 48 hours prior (discussed small stroke risk while holding eliquis)    Thank you for allowing me to participate in the care of this patient. Please do not hesitate to call me with any questions or concerns.    Juan Good MD, PhD  Cardiac Electrophysiology

## 2023-11-22 ENCOUNTER — PATIENT MESSAGE (OUTPATIENT)
Dept: ELECTROPHYSIOLOGY | Facility: CLINIC | Age: 68
End: 2023-11-22
Payer: MEDICARE

## 2023-11-22 ENCOUNTER — TELEPHONE (OUTPATIENT)
Dept: ELECTROPHYSIOLOGY | Facility: CLINIC | Age: 68
End: 2023-11-22
Payer: MEDICARE

## 2023-11-22 DIAGNOSIS — I10 PRIMARY HYPERTENSION: ICD-10-CM

## 2023-11-22 DIAGNOSIS — I42.2 HYPERTROPHIC CARDIOMYOPATHY: Primary | ICD-10-CM

## 2023-11-22 DIAGNOSIS — Z01.818 PRE-OP TESTING: ICD-10-CM

## 2023-11-22 DIAGNOSIS — Z79.01 CHRONIC ANTICOAGULATION: ICD-10-CM

## 2023-11-22 NOTE — TELEPHONE ENCOUNTER
Removed intact   Spoke with Patient . Scheduled for Single ICD  procedure on 11/30/2023 with Dr Good. Procedure details reviewed and advised that pre procedure patient instructions will be sent via patient portal as requested. Advised to call the office for any questions or concerns prior to scheduled procedure. Understanding verbalized.

## 2023-11-27 ENCOUNTER — LAB VISIT (OUTPATIENT)
Dept: LAB | Facility: HOSPITAL | Age: 68
End: 2023-11-27
Attending: INTERNAL MEDICINE
Payer: MEDICARE

## 2023-11-27 DIAGNOSIS — Z01.818 PRE-OP TESTING: ICD-10-CM

## 2023-11-27 DIAGNOSIS — I10 PRIMARY HYPERTENSION: ICD-10-CM

## 2023-11-27 DIAGNOSIS — I42.2 HYPERTROPHIC CARDIOMYOPATHY: ICD-10-CM

## 2023-11-27 DIAGNOSIS — Z79.01 CHRONIC ANTICOAGULATION: ICD-10-CM

## 2023-11-27 LAB
ANION GAP SERPL CALC-SCNC: 7 MMOL/L (ref 8–16)
APTT PPP: 31.4 SEC (ref 21–32)
BASOPHILS # BLD AUTO: 0.06 K/UL (ref 0–0.2)
BASOPHILS NFR BLD: 0.9 % (ref 0–1.9)
BUN SERPL-MCNC: 20 MG/DL (ref 8–23)
CALCIUM SERPL-MCNC: 9.8 MG/DL (ref 8.7–10.5)
CHLORIDE SERPL-SCNC: 101 MMOL/L (ref 95–110)
CO2 SERPL-SCNC: 32 MMOL/L (ref 23–29)
CREAT SERPL-MCNC: 1.1 MG/DL (ref 0.5–1.4)
DIFFERENTIAL METHOD: ABNORMAL
EOSINOPHIL # BLD AUTO: 0.3 K/UL (ref 0–0.5)
EOSINOPHIL NFR BLD: 4.2 % (ref 0–8)
ERYTHROCYTE [DISTWIDTH] IN BLOOD BY AUTOMATED COUNT: 12.6 % (ref 11.5–14.5)
EST. GFR  (NO RACE VARIABLE): >60 ML/MIN/1.73 M^2
GLUCOSE SERPL-MCNC: 123 MG/DL (ref 70–110)
HCT VFR BLD AUTO: 46.2 % (ref 40–54)
HGB BLD-MCNC: 15.8 G/DL (ref 14–18)
IMM GRANULOCYTES # BLD AUTO: 0.05 K/UL (ref 0–0.04)
IMM GRANULOCYTES NFR BLD AUTO: 0.8 % (ref 0–0.5)
INR PPP: 1.1 (ref 0.8–1.2)
LYMPHOCYTES # BLD AUTO: 1.7 K/UL (ref 1–4.8)
LYMPHOCYTES NFR BLD: 25.9 % (ref 18–48)
MCH RBC QN AUTO: 30.4 PG (ref 27–31)
MCHC RBC AUTO-ENTMCNC: 34.2 G/DL (ref 32–36)
MCV RBC AUTO: 89 FL (ref 82–98)
MONOCYTES # BLD AUTO: 0.5 K/UL (ref 0.3–1)
MONOCYTES NFR BLD: 8.2 % (ref 4–15)
NEUTROPHILS # BLD AUTO: 3.9 K/UL (ref 1.8–7.7)
NEUTROPHILS NFR BLD: 60 % (ref 38–73)
NRBC BLD-RTO: 0 /100 WBC
PLATELET # BLD AUTO: 215 K/UL (ref 150–450)
PMV BLD AUTO: 10.7 FL (ref 9.2–12.9)
POTASSIUM SERPL-SCNC: 4.5 MMOL/L (ref 3.5–5.1)
PROTHROMBIN TIME: 12.1 SEC (ref 9–12.5)
RBC # BLD AUTO: 5.2 M/UL (ref 4.6–6.2)
SODIUM SERPL-SCNC: 140 MMOL/L (ref 136–145)
WBC # BLD AUTO: 6.49 K/UL (ref 3.9–12.7)

## 2023-11-27 PROCEDURE — 85730 THROMBOPLASTIN TIME PARTIAL: CPT | Performed by: INTERNAL MEDICINE

## 2023-11-27 PROCEDURE — 85610 PROTHROMBIN TIME: CPT | Performed by: INTERNAL MEDICINE

## 2023-11-27 PROCEDURE — 80048 BASIC METABOLIC PNL TOTAL CA: CPT | Performed by: INTERNAL MEDICINE

## 2023-11-27 PROCEDURE — 85025 COMPLETE CBC W/AUTO DIFF WBC: CPT | Performed by: INTERNAL MEDICINE

## 2023-11-27 PROCEDURE — 36415 COLL VENOUS BLD VENIPUNCTURE: CPT | Performed by: INTERNAL MEDICINE

## 2023-11-29 ENCOUNTER — TELEPHONE (OUTPATIENT)
Dept: ELECTROPHYSIOLOGY | Facility: CLINIC | Age: 68
End: 2023-11-29
Payer: MEDICARE

## 2023-11-29 NOTE — TELEPHONE ENCOUNTER
Call returned . Patient stated that he wanted to clarify if he was to hold his carvedilol tomorrow morning on the day of his procedure. Patient advised that he does not have to hold the Carvedilol the morning of his procedure, only the Telmisartan. Patient verbalized  understanding and confirmed last dose of Eliquis was taken on 11/27/2023 as instructed.

## 2023-11-29 NOTE — SUBJECTIVE & OBJECTIVE
Past Medical History:   Diagnosis Date    Cancer 2022    prostate    HLD (hyperlipidemia)     Hypertension        Past Surgical History:   Procedure Laterality Date    CEREBRAL ANGIOGRAM N/A 3/26/2023    Procedure: ANGIOGRAM-CEREBRAL;  Surgeon: Jocelyne Surgeon;  Location: [facility];  Service: Anesthesiology;  Laterality: N/A;       Review of patient's allergies indicates:  No Known Allergies    No current facility-administered medications on file prior to encounter.     Current Outpatient Medications on File Prior to Encounter   Medication Sig    apixaban (ELIQUIS) 5 mg Tab Take 1 tablet (5 mg total) by mouth 2 (two) times daily.    ascorbic acid, vitamin C, (VITAMIN C) 1000 MG tablet 1 tablet.    atorvastatin (LIPITOR) 20 MG tablet Take 1 tablet (20 mg total) by mouth every evening.    buPROPion (WELLBUTRIN XL) 150 MG TB24 tablet Take 150 mg by mouth once daily.    carvediloL (COREG) 25 MG tablet Take 1 tablet (25 mg total) by mouth 2 (two) times daily.    cetirizine (ZYRTEC) 10 mg Cap 1 tablet.    FLUoxetine 10 MG capsule Take by mouth.    telmisartan (MICARDIS) 80 MG Tab Take 1 tablet (80 mg total) by mouth every evening.    turmeric 400 mg Cap as directed    zinc gluconate 50 mg tablet 1 tablet.     Family History       Problem Relation (Age of Onset)    Hypertension Brother          Tobacco Use    Smoking status: Never    Smokeless tobacco: Not on file   Substance and Sexual Activity    Alcohol use: Yes    Drug use: Not on file    Sexual activity: Not on file     Review of Systems   All other systems reviewed and are negative.    Objective:     Vital Signs (Most Recent):    Vital Signs (24h Range):  BP: ()/()   Arterial Line BP: ()/()         There is no height or weight on file to calculate BMI.            No intake or output data in the 24 hours ending 11/29/23 1704    Lines/Drains/Airways       None                    Physical Exam  Vitals and nursing note reviewed.   Constitutional:       Appearance: Normal  appearance.   Cardiovascular:      Rate and Rhythm: Normal rate and regular rhythm.      Pulses: Normal pulses.   Pulmonary:      Effort: Pulmonary effort is normal.   Musculoskeletal:      Right lower leg: No edema.      Left lower leg: No edema.   Skin:     General: Skin is warm.   Neurological:      Mental Status: He is alert.          Significant Labs: All pertinent lab results from the last 24 hours have been reviewed.

## 2023-11-29 NOTE — TELEPHONE ENCOUNTER
----- Message from Diana Scherer MA sent at 11/29/2023  9:57 AM CST -----  Contact: self  Pt is calling to speak to the nurse again to confirm about Coreg,not to take.Having procedure tomorrow Thurs 11/30.Please call [phone]

## 2023-11-29 NOTE — ASSESSMENT & PLAN NOTE
In summary, Mr. 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 We discussed the etiology and pathophysiology of sudden cardiac death in a patient population such as his and how an ICD may provide mortality benefit. We discussed the long-term implications of device implantation including infection risk, inappropriate shocks, and device/lead malfunction requiring further procedures. We discussed the alternatives, benefits and risks of the procedure including pain, infection, bleeding, injury to lung causing pneumothorax requiring tube placement, injury to heart valves, puncture of the heart leading to pericardial effusion or tamponade requiring tube drainage, heart attack, stroke and death. All questions answered. He agrees to proceed.     Plan  Single chamber ICD implant, Biotronic DX lead  Anesthesia  Hold eliquis 48 hours prior (discussed small stroke risk while holding eliquis)

## 2023-11-29 NOTE — TELEPHONE ENCOUNTER
Spoke to patient    CONFIRMED procedure arrival time of 8am tomorrow for ICD implant with Dr Good    Reiterated instructions including:  -Directions to check in desk  -NPO after midnight night prior to procedure  -High importance of HOLDING Eliquis for 2 days prior to procedure and he confirmed that his last dose was 11/27/2023. He will hold his Telmisartan in the am as well.   -Pre-procedure LABS reviewed, no alerts noted  -Confirmed no fever, cough, or shortness of breath in the past 30 days  -Bathe night prior and morning prior to procedure with Hibiclens solution or an antibacterial soap  -Reviewed current visitor policy    Patient verbalized understanding of above and appreciated the call.

## 2023-11-30 ENCOUNTER — ANESTHESIA (OUTPATIENT)
Dept: MEDSURG UNIT | Facility: HOSPITAL | Age: 68
End: 2023-11-30
Payer: MEDICARE

## 2023-11-30 ENCOUNTER — HOSPITAL ENCOUNTER (OUTPATIENT)
Facility: HOSPITAL | Age: 68
Discharge: HOME | End: 2023-11-30
Attending: INTERNAL MEDICINE | Admitting: INTERNAL MEDICINE
Payer: MEDICARE

## 2023-11-30 ENCOUNTER — ANESTHESIA EVENT (OUTPATIENT)
Dept: MEDSURG UNIT | Facility: HOSPITAL | Age: 68
End: 2023-11-30
Payer: MEDICARE

## 2023-11-30 VITALS
WEIGHT: 181 LBS | RESPIRATION RATE: 18 BRPM | SYSTOLIC BLOOD PRESSURE: 158 MMHG | HEART RATE: 58 BPM | OXYGEN SATURATION: 97 % | DIASTOLIC BLOOD PRESSURE: 86 MMHG | HEIGHT: 67 IN | TEMPERATURE: 97 F | BODY MASS INDEX: 28.41 KG/M2

## 2023-11-30 DIAGNOSIS — I42.2 HYPERTROPHIC CARDIOMYOPATHY: ICD-10-CM

## 2023-11-30 DIAGNOSIS — I49.9 ARRHYTHMIA: ICD-10-CM

## 2023-11-30 PROCEDURE — 93010 EKG 12-LEAD: ICD-10-PCS | Mod: ,,, | Performed by: INTERNAL MEDICINE

## 2023-11-30 PROCEDURE — 63600175 PHARM REV CODE 636 W HCPCS: Performed by: INTERNAL MEDICINE

## 2023-11-30 PROCEDURE — 25000003 PHARM REV CODE 250: Performed by: STUDENT IN AN ORGANIZED HEALTH CARE EDUCATION/TRAINING PROGRAM

## 2023-11-30 PROCEDURE — 37000009 HC ANESTHESIA EA ADD 15 MINS: Performed by: INTERNAL MEDICINE

## 2023-11-30 PROCEDURE — D9220A PRA ANESTHESIA: ICD-10-PCS | Mod: ANES,,, | Performed by: ANESTHESIOLOGY

## 2023-11-30 PROCEDURE — 33249 PR ICD INSERT SNGL/DUAL W/LEADS: ICD-10-PCS | Mod: ,,, | Performed by: INTERNAL MEDICINE

## 2023-11-30 PROCEDURE — 25500020 PHARM REV CODE 255: Performed by: INTERNAL MEDICINE

## 2023-11-30 PROCEDURE — 25500020 PHARM REV CODE 255: Performed by: NURSE ANESTHETIST, CERTIFIED REGISTERED

## 2023-11-30 PROCEDURE — C1894 INTRO/SHEATH, NON-LASER: HCPCS | Performed by: INTERNAL MEDICINE

## 2023-11-30 PROCEDURE — C1722 AICD, SINGLE CHAMBER: HCPCS | Performed by: INTERNAL MEDICINE

## 2023-11-30 PROCEDURE — 93010 ELECTROCARDIOGRAM REPORT: CPT | Mod: ,,, | Performed by: INTERNAL MEDICINE

## 2023-11-30 PROCEDURE — 25000003 PHARM REV CODE 250: Performed by: INTERNAL MEDICINE

## 2023-11-30 PROCEDURE — 63600175 PHARM REV CODE 636 W HCPCS: Performed by: NURSE ANESTHETIST, CERTIFIED REGISTERED

## 2023-11-30 PROCEDURE — 93005 ELECTROCARDIOGRAM TRACING: CPT

## 2023-11-30 PROCEDURE — D9220A PRA ANESTHESIA: ICD-10-PCS | Mod: CRNA,,, | Performed by: NURSE ANESTHETIST, CERTIFIED REGISTERED

## 2023-11-30 PROCEDURE — 37000008 HC ANESTHESIA 1ST 15 MINUTES: Performed by: INTERNAL MEDICINE

## 2023-11-30 PROCEDURE — 33249 INSJ/RPLCMT DEFIB W/LEAD(S): CPT | Performed by: INTERNAL MEDICINE

## 2023-11-30 PROCEDURE — 33249 INSJ/RPLCMT DEFIB W/LEAD(S): CPT | Mod: ,,, | Performed by: INTERNAL MEDICINE

## 2023-11-30 PROCEDURE — 25000003 PHARM REV CODE 250: Performed by: NURSE ANESTHETIST, CERTIFIED REGISTERED

## 2023-11-30 PROCEDURE — D9220A PRA ANESTHESIA: Mod: CRNA,,, | Performed by: NURSE ANESTHETIST, CERTIFIED REGISTERED

## 2023-11-30 PROCEDURE — D9220A PRA ANESTHESIA: Mod: ANES,,, | Performed by: ANESTHESIOLOGY

## 2023-11-30 PROCEDURE — C1777 LEAD, AICD, ENDO SINGLE COIL: HCPCS | Performed by: INTERNAL MEDICINE

## 2023-11-30 DEVICE — LEAD PAMIRA S DX 65/15: Type: IMPLANTABLE DEVICE | Site: HEART | Status: FUNCTIONAL

## 2023-11-30 DEVICE — IMPLANTABLE DEVICE
Type: IMPLANTABLE DEVICE | Site: HEART | Status: FUNCTIONAL
Brand: ACTICOR 7 VR-T DX

## 2023-11-30 RX ORDER — FENTANYL CITRATE 50 UG/ML
INJECTION, SOLUTION INTRAMUSCULAR; INTRAVENOUS
Status: DISCONTINUED | OUTPATIENT
Start: 2023-11-30 | End: 2023-11-30

## 2023-11-30 RX ORDER — VANCOMYCIN HYDROCHLORIDE 1 G/20ML
INJECTION, POWDER, LYOPHILIZED, FOR SOLUTION INTRAVENOUS
Status: DISCONTINUED | OUTPATIENT
Start: 2023-11-30 | End: 2023-11-30 | Stop reason: HOSPADM

## 2023-11-30 RX ORDER — ACETAMINOPHEN 325 MG/1
650 TABLET ORAL EVERY 4 HOURS PRN
Status: DISCONTINUED | OUTPATIENT
Start: 2023-11-30 | End: 2023-11-30 | Stop reason: HOSPADM

## 2023-11-30 RX ORDER — IODIXANOL 320 MG/ML
INJECTION, SOLUTION INTRAVASCULAR
Status: DISCONTINUED | OUTPATIENT
Start: 2023-11-30 | End: 2023-11-30

## 2023-11-30 RX ORDER — MIDAZOLAM HYDROCHLORIDE 1 MG/ML
INJECTION, SOLUTION INTRAMUSCULAR; INTRAVENOUS
Status: DISCONTINUED | OUTPATIENT
Start: 2023-11-30 | End: 2023-11-30

## 2023-11-30 RX ORDER — DOXYCYCLINE 100 MG/1
100 CAPSULE ORAL EVERY 12 HOURS
Qty: 10 CAPSULE | Refills: 0 | Status: SHIPPED | OUTPATIENT
Start: 2023-11-30 | End: 2023-12-05

## 2023-11-30 RX ORDER — IODIXANOL 320 MG/ML
INJECTION, SOLUTION INTRAVASCULAR
Status: DISCONTINUED | OUTPATIENT
Start: 2023-11-30 | End: 2023-11-30 | Stop reason: HOSPADM

## 2023-11-30 RX ORDER — LIDOCAINE HYDROCHLORIDE 20 MG/ML
INJECTION, SOLUTION EPIDURAL; INFILTRATION; INTRACAUDAL; PERINEURAL
Status: DISCONTINUED | OUTPATIENT
Start: 2023-11-30 | End: 2023-11-30

## 2023-11-30 RX ORDER — PROPOFOL 10 MG/ML
VIAL (ML) INTRAVENOUS CONTINUOUS PRN
Status: DISCONTINUED | OUTPATIENT
Start: 2023-11-30 | End: 2023-11-30

## 2023-11-30 RX ORDER — SODIUM CHLORIDE 9 MG/ML
INJECTION, SOLUTION INTRAMUSCULAR; INTRAVENOUS; SUBCUTANEOUS
Status: DISCONTINUED | OUTPATIENT
Start: 2023-11-30 | End: 2023-11-30 | Stop reason: HOSPADM

## 2023-11-30 RX ADMIN — SODIUM CHLORIDE: 0.9 INJECTION, SOLUTION INTRAVENOUS at 10:11

## 2023-11-30 RX ADMIN — CEFAZOLIN 2 G: 2 INJECTION, POWDER, FOR SOLUTION INTRAMUSCULAR; INTRAVENOUS at 10:11

## 2023-11-30 RX ADMIN — LIDOCAINE HYDROCHLORIDE 50 MG: 20 INJECTION, SOLUTION EPIDURAL; INFILTRATION; INTRACAUDAL; PERINEURAL at 10:11

## 2023-11-30 RX ADMIN — MIDAZOLAM HYDROCHLORIDE 2 MG: 1 INJECTION, SOLUTION INTRAMUSCULAR; INTRAVENOUS at 10:11

## 2023-11-30 RX ADMIN — FENTANYL CITRATE 50 MCG: 50 INJECTION, SOLUTION INTRAMUSCULAR; INTRAVENOUS at 10:11

## 2023-11-30 RX ADMIN — FENTANYL CITRATE 50 MCG: 50 INJECTION, SOLUTION INTRAMUSCULAR; INTRAVENOUS at 11:11

## 2023-11-30 RX ADMIN — PROPOFOL 50 MCG/KG/MIN: 10 INJECTION, EMULSION INTRAVENOUS at 10:11

## 2023-11-30 RX ADMIN — ACETAMINOPHEN 650 MG: 325 TABLET ORAL at 12:11

## 2023-11-30 RX ADMIN — IODIXANOL 10 ML: 320 INJECTION, SOLUTION INTRAVASCULAR at 11:11

## 2023-11-30 NOTE — DISCHARGE SUMMARY
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 He was admitted with a stroke in 2023, manifested by right sided weakness and dysarthria. He received TNK. CTA showed left M1 occlusion and he underwent thrombectomy. Symptoms improved. ECHO 3/26 showed LV c/f apical hypertrophic CM w/ apical pseudoaneurysm, EF 50%, and segmental LV wall motion abnormalities. He had no history of syncope. Reports an uncle may have  of a heart attack in his 50s. All 3 brothers have HOCM. His last angiogram was in  when diagnosed with HOCM but was told he had clean coronaries. Cardiology consulted for management and possible AC therapy. EP was consulted for consideration of ICD in a patient with HOCM and apical aneurysm. Recent MRI noted apical HCM and an apical aneurysm. No LGE. He and I had a long conversation. He elected to proceed with ICD implantations.        Procedure(s) (LRB):  Insertion, ICD, Single Chamber (Left)     Indwelling Lines/Drains at time of discharge:  Lines/Drains/Airways       None                   Hospital Course:  Patient underwent successful implantation of single chamber ICD for primary prevention for apical hypertrophic cardiomyopathy without evidence of complications intra- and post-procedure. CXR shows appropriate lead placement without acute cardiopulmonary process. ECG without evidence of device malfunction.     EP medications at discharge:  Initiation of doxycyline 100 mg BID x 5 days.  Patient was instructed to hold their anticoagulation for 5 days  (when they complete their antibiotics)     Patient given activity restrictions and warning signs/symptoms to monitor for, including chest pain, shortness of breath, fevers, or evidence of complications at the generator site [swelling, drainage, redness, tenderness, or warmth]. They were instructed to seek immediate medical attention if these occur and to contract the EP department. Follow up with device clinic in 1 week. No CP, SOB, or complications at the generator site on discharge. All questions and concerns answered prior to discharge.     --------------------------------------------------------------------------------    Goals of Care Treatment Preferences:  Code Status: Full Code      Consults:     Significant Diagnostic Studies: N/A    Pending Diagnostic Studies:       Procedure Component Value Units Date/Time    EKG 12-lead [[id number]]     Order Status: Sent Lab Status: No result     X-Ray Chest AP Portable [[id number]]     Order Status: Sent Lab Status: No result             Final Active Diagnoses:    Diagnosis Date Noted POA    PRINCIPAL PROBLEM:  Hypertrophic cardiomyopathy [I42.2] 09/30/2013 Yes      Problems Resolved During this Admission:     No new Assessment & Plan notes have been filed under this hospital service since the last note was generated.  Service: Arrhythmia      Discharged Condition: stable    Disposition:     Follow Up:   Follow-up Information       DEVICE [facility] CL[facility] Follow up in 1 week(s).               Juan Good MD Follow up in 4 month(s).    Specialties: Electrophysiology, Cardiology  Contact information:  [address] [place] [address] [id number] [phone]                           Patient Instructions:   No discharge procedures on file.  Medications:  Reconciled Home Medications:      Medication List        START taking these medications      doxycycline 100 MG Cap  Commonly known as: VIBRAMYCIN  Take 1 capsule (100 mg total) by mouth every 12 (twelve) hours. for 5  days            CONTINUE taking these medications      apixaban 5 mg Tab  Commonly known as: ELIQUIS  Take 1 tablet (5 mg total) by mouth 2 (two) times daily.     ascorbic acid (vitamin C) 1000 MG tablet  Commonly known as: VITAMIN C  1 tablet.     atorvastatin 20 MG tablet  Commonly known as: LIPITOR  Take 1 tablet (20 mg total) by mouth every evening.     buPROPion 150 MG TB24 tablet  Commonly known as: WELLBUTRIN XL  Take 150 mg by mouth once daily.     carvediloL 25 MG tablet  Commonly known as: COREG  Take 1 tablet (25 mg total) by mouth 2 (two) times daily.     FLUoxetine 10 MG capsule  Take by mouth.     telmisartan 80 MG Tab  Commonly known as: MICARDIS  Take 1 tablet (80 mg total) by mouth every evening.     turmeric 400 mg Cap  as directed     zinc gluconate 50 mg tablet  1 tablet.     ZyrTEC 10 mg Cap  Generic drug: cetirizine  1 tablet.              Time spent on the discharge of patient: 45 minutes    Kayce Dominguez MD  Cardiac Electrophysiology  [facility] - Cardiology

## 2023-11-30 NOTE — H&P
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 He was admitted with a stroke in 2023, manifested by right sided weakness and dysarthria. He received TNK. CTA showed left M1 occlusion and he underwent thrombectomy. Symptoms improved. ECHO 3/26 showed LV c/f apical hypertrophic CM w/ apical pseudoaneurysm, EF 50%, and segmental LV wall motion abnormalities. He had no history of syncope. Reports an uncle may have  of a heart attack in his 50s. All 3 brothers have HOCM. His last angiogram was in  when diagnosed with HOCM but was told he had clean coronaries. Cardiology consulted for management and possible AC therapy. EP was consulted for consideration of ICD in a patient with HOCM and apical aneurysm. Recent MRI noted apical HCM and an apical aneurysm. No LGE. He and I had a long conversation. He elected to proceed with ICD implantations.        Past Medical History:   Diagnosis Date    Cancer     prostate    HLD (hyperlipidemia)     Hypertension     Sleep apnea        Past Surgical History:   Procedure Laterality Date    CEREBRAL ANGIOGRAM N/A 2023    Procedure: ANGIOGRAM-CEREBRAL;  Surgeon: Jocelyne Surgeon;  Location: [facility];  Service: Anesthesiology;  Laterality: N/A;    PROSTATECTOMY         Review of patient's allergies indicates:  No Known Allergies    No current facility-administered medications on file prior to encounter.     Current Outpatient Medications on File Prior to Encounter   Medication Sig    ascorbic acid, vitamin C, (VITAMIN C) 1000 MG tablet 1 tablet.    atorvastatin (LIPITOR) 20 MG tablet Take 1  tablet (20 mg total) by mouth every evening.    buPROPion (WELLBUTRIN XL) 150 MG TB24 tablet Take 150 mg by mouth once daily.    carvediloL (COREG) 25 MG tablet Take 1 tablet (25 mg total) by mouth 2 (two) times daily.    cetirizine (ZYRTEC) 10 mg Cap 1 tablet.    FLUoxetine 10 MG capsule Take by mouth.    telmisartan (MICARDIS) 80 MG Tab Take 1 tablet (80 mg total) by mouth every evening.    turmeric 400 mg Cap as directed    apixaban (ELIQUIS) 5 mg Tab Take 1 tablet (5 mg total) by mouth 2 (two) times daily.    zinc gluconate 50 mg tablet 1 tablet.     Family History       Problem Relation (Age of Onset)    Hypertension Brother          Tobacco Use    Smoking status: Never    Smokeless tobacco: Not on file   Substance and Sexual Activity    Alcohol use: Yes     Alcohol/week: 18.0 standard drinks of alcohol     Types: 18 Drinks containing 0.5 oz of alcohol per week    Drug use: Never    Sexual activity: Not on file     Review of Systems   All other systems reviewed and are negative.    Objective:     Vital Signs (Most Recent):  Temp: 97.7 °F (36.5 °C) (11/30/23 0811)  Pulse: (!) 55 (11/30/23 0811)  Resp: 16 (11/30/23 0811)  BP: (!) 149/91 (11/30/23 0812)  SpO2: 99 % (11/30/23 0811) Vital Signs (24h Range):  Temp:  [97.7 °F (36.5 °C)] 97.7 °F (36.5 °C)  Pulse:  [55] 55  Resp:  [16] 16  SpO2:  [99 %] 99 %  BP: (141-149)/(85-91) 149/91       Weight: 82.1 kg (181 lb)  Body mass index is 28.35 kg/m².    SpO2: 99 %        Physical Exam  Vitals and nursing note reviewed.   Constitutional:       Appearance: Normal appearance.   Cardiovascular:      Rate and Rhythm: Normal rate and regular rhythm.      Pulses: Normal pulses.      Heart sounds: Normal heart sounds.   Pulmonary:      Effort: Pulmonary effort is normal.   Musculoskeletal:      Right lower leg: No edema.      Left lower leg: No edema.   Skin:     General: Skin is warm.   Neurological:      Mental Status: He is alert.            Significant Labs: All pertinent  lab results from the last 24 hours have been reviewed.    Assessment and Plan:     * Hypertrophic cardiomyopathy  In summary, Mr. 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 We discussed the etiology and pathophysiology of sudden cardiac death in a patient population such as his and how an ICD may provide mortality benefit. We discussed the long-term implications of device implantation including infection risk, inappropriate shocks, and device/lead malfunction requiring further procedures. We discussed the alternatives, benefits and risks of the procedure including pain, infection, bleeding, injury to lung causing pneumothorax requiring tube placement, injury to heart valves, puncture of the heart leading to pericardial effusion or tamponade requiring tube drainage, heart attack, stroke and death. All questions answered. He agrees to proceed.     Plan  Single chamber ICD implant, Biotronic DX lead  Anesthesia  Hold eliquis 48 hours prior (discussed small stroke risk while holding eliquis)           Kayce Dominguez MD  Cardiac Electrophysiology  [facility] - Short Stay Cardiac Unit

## 2023-11-30 NOTE — ANESTHESIA PREPROCEDURE EVALUATION
[facility]  Anesthesia Pre-Operative Evaluation        Patient Name: Peter Munoz  YOB: 1955  MRN: [id number]    SUBJECTIVE:     Pre-operative Evaluation for Procedure(s) (LRB):  ANGIOGRAM-CEREBRAL (N/A)     11/30/2023    Peter Munoz is a 68 y.o. male with a PMHx significant for HLD, HTN, prostate cancer s/p radical prostatectomy, and hypertrophic cardiomyopathy (unknown EF) presenting to the ED with right-sided weakness and aphasia. He was found to have an acute left M1 occlusion on imaging. Patient will be taken to OR for class A cerebral angiogram with Interventional Radiology.     Per patient's wife, he has been NPO since approx 9 PM when they had dinner at a wedding they were attending.       Previous Airway: None documented.    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       Review of patient's allergies indicates:  No Known Allergies    Current Outpatient Medications   Medication Instructions    apixaban (ELIQUIS) 5 mg, Oral, 2 times daily    ascorbic acid, vitamin C, (VITAMIN C) 1000 MG tablet 1 tablet    atorvastatin (LIPITOR) 20 mg, Oral, Nightly    buPROPion (WELLBUTRIN XL) 150 mg, Daily    carvediloL (COREG) 25 mg, Oral, 2 times daily    cetirizine (ZYRTEC) 10 mg Cap 1 tablet    FLUoxetine 10 MG capsule Oral    telmisartan (MICARDIS) 80 mg, Oral, Nightly    turmeric 400 mg Cap as directed    zinc gluconate 50 mg tablet 1 tablet       Past Surgical History:   Procedure Laterality Date    CEREBRAL ANGIOGRAM N/A 3/26/2023    Procedure: ANGIOGRAM-CEREBRAL;  Surgeon: Jocelyne Surgeon;  Location: [facility];  Service:  Anesthesiology;  Laterality: N/A;       Social History     Substance and Sexual Activity   Drug Use Not on file     Alcohol Use: Not on file     Tobacco Use: Unknown (11/30/2023)    Patient History     Smoking Tobacco Use: Never     Smokeless Tobacco Use: Unknown     Passive Exposure: Not on file       OBJECTIVE:     Vital Signs Range (Last 24H):         Significant Labs    Heme Profile  Lab Results   Component Value Date    WBC 6.49 11/27/2023    HGB 15.8 11/27/2023    HCT 46.2 11/27/2023     11/27/2023       Coagulation Studies  Lab Results   Component Value Date    LABPROT 12.1 11/27/2023    INR 1.1 11/27/2023    APTT 31.4 11/27/2023       BMP  Lab Results   Component Value Date     11/27/2023    K 4.5 11/27/2023     11/27/2023    CO2 32 (H) 11/27/2023    BUN 20 11/27/2023    CREATININE 1.1 11/27/2023    MG 1.7 03/28/2023    PHOS 2.9 03/28/2023       Liver Function Tests  Lab Results   Component Value Date    AST 16 05/01/2023    ALT 22 05/01/2023    ALKPHOS 71 05/01/2023    BILITOT 0.8 05/01/2023    PROT 6.5 05/01/2023    ALBUMIN 4.2 05/01/2023       Lipid Profile  Lab Results   Component Value Date    CHOL 112 (L) 05/01/2023    HDL 31 (L) 05/01/2023    TRIG 97 05/01/2023       Endocrine Profile  Lab Results   Component Value Date    HGBA1C 5.2 03/26/2023    TSH 3.352 03/26/2023         Cardiac Studies    EKG:   Results for orders placed or performed during the hospital encounter of 11/21/23   EKG 12-lead    Collection Time: 11/21/23  2:08 PM    Narrative    Test Reason : I49.8,    Vent. Rate : 063 BPM     Atrial Rate : 063 BPM     P-R Int : 154 ms          QRS Dur : 088 ms      QT Int : 456 ms       P-R-T Axes : 053 020 197 degrees     QTc Int : 466 ms    Sinus rhythm with Premature atrial complexes  ST and Marked T wave abnormality, consider anterolateral ischemia  Prolonged QT  Abnormal ECG  When compared with ECG of 26-MAR-2023 08:05,  No significant change was found  Confirmed by Ismael  Nate TATE ([id number]) on 11/21/2023 2:22:35 PM    Referred By: RUFINA YEE           Confirmed By:Nate Guevara MD       TTE:  No results found for this or any previous visit.      ASSESSMENT/PLAN:      11/30/2023  Peter Munoz is a 68 y.o., male.      Pre-op Assessment    I have reviewed the Patient Summary Reports.     I have reviewed the Nursing Notes. I have reviewed the NPO Status.   I have reviewed the Medications.     Review of Systems  Anesthesia Hx:  No problems with previous Anesthesia             Denies Family Hx of Anesthesia complications.    Denies Personal Hx of Anesthesia complications.                    Hematology/Oncology:                        --  Cancer in past history:       Other (see Oncology comments)          Oncology Comments: H/o prostate cancer s/p radical prostatectomy      Cardiovascular:     Hypertension       CHF    hyperlipidemia                             Hepatic/GI:  Hepatic/GI Normal                 Neurological:  Neurology Normal                                      Endocrine:  Endocrine Normal            Psych:  Psychiatric Normal                Left ventricle with morphology consistent with apical hypertrophic cardiomyopathy with apical pseudoaneurysm vs. atypical takotsubo cardiomyopathy, suspect former.  Mild left atrial enlargement.  The left ventricle is normal in size with low normal systolic function.  The estimated ejection fraction is 50%.  Indeterminate left ventricular diastolic function.  There are segmental left ventricular wall motion abnormalities.  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  Intermediate central venous pressure (8 mmHg).  The estimated PA systolic pressure is 17 mmHg.  Mild tricuspid regurgitation.    Physical Exam  General: Confusion and Alert  PERRLA  Aphasic     Airway:  Mallampati: IV   Mouth Opening: Small, but > 3cm  TM Distance: Normal  Tongue: Normal  Neck ROM: Normal ROM    Dental:  Intact        Anesthesia Plan  Type of  Anesthesia, risks & benefits discussed:    Anesthesia Type: MAC, Gen Natural Airway  Intra-op Monitoring Plan: Standard ASA Monitors and Art Line  Post Op Pain Control Plan: multimodal analgesia and IV/PO Opioids PRN  Induction:  IV  Airway Plan: Direct, Post-Induction  Informed Consent: Informed consent signed with the Patient representative and all parties understand the risks and agree with anesthesia plan.  All questions answered.   ASA Score: 4 Emergent  Day of Surgery Review of History & Physical: H&P Update referred to the surgeon/provider.    Ready For Surgery From Anesthesia Perspective.     .

## 2023-11-30 NOTE — DISCHARGE INSTRUCTIONS
Medication instructions:    Complete your 5 days of antibiotics  If you are on a blood thinner (examples: apixiban [Eliquis], rivaroxaban [Xarelto], dabigatran [Pradaxa], or enoxaparin [Lovenox]), do not take your blood thinner for the next 5 days after your procedure. You can resume after 5 days post-procedure (i.e., when you complete your antibiotics). If you are on Coumadin you can continue to take it the day of your procedure  Pain control: you can take up to Tylenol 1000 mg every 6 hours as needed or (if you do not have kidney disease) ibuprofen 600 mg every 6 hours as needed as needed for pain control (if you do not have kidney disease). If unsure, please contact your primary care physician for recommendations.      Activity restrictions & precautions:    Sling & arm instructions:  Wear your sling for 48 hours. After 48 hours, you can take your arm out of your sling.   You must wear your sling at night when you sleep for 6 weeks after your procedure  Do not lift >5 lbs for 2 weeks.  Do not raise your elbow above your shoulder on the same side as your device for 6 weeks.     Surgical site   Dressing (see images below)  **Note: these are general rules to follow unless instructed otherwise** - see images below  If you have a brown rectangular gel bandage (A.K.A. Aquacel Ag dressing) over your surgical incision do not remove it. This will be removed at your device clinic follow up appointment in 1 week.  If you have a square light brown bandage (A.K.A Mepilex dressing) you should remove in 48 hours after your procedure.  If you have a pressure dressing (white elastic tape with gauze underneath or a very large bandage that covers your left chest and is shaped like a triangle) over your surgical site, this should be removed the morning after your procedure unless instructed otherwise.  Do not place any creams or ointments over the surgical site. This could effect the integrity of the Dermabond glue.  You can shower  after 24 hours post-procedure, but do not let the jet directly hit your pocket site for at least 2 weeks. Recommend showering with your back to the shower head.   Do not reach your arm (on the same side as your device) around your back during showering for 6 weeks.  Do not submerge your surgical incision site under water (swimming, bathing if you submerge the actual surgical site) for at least 6 week. It is imperative that the surgical site is completely healed prior to doing so    Follow up in device clinic in 1 week to check your incision and device function  Please contact the electrophysiology clinic if you have any questions or if you experience: potential surgical/pocket site complications (pain, swelling, bleeding, drainage), fevers, chest pain/shortness of breath, or for any other concerns.

## 2023-11-30 NOTE — PLAN OF CARE
Pt ambulates around unit without stand by assist only.  Abx delivered to the bedside.   Voids in restroom without issue.  Patient discharged per MD orders. Instructions given on medications, wound care, activity, signs of infection, when to call MD, and follow up appointments. Pt verbalized understanding. PIV removed. Patient and family used wc off unit to private vehicle.

## 2023-11-30 NOTE — Clinical Note
The lead thresholds were tested, was sutured in place and was secured in place. A new lead was attached to the right ventricle.

## 2023-11-30 NOTE — HPI
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 He was admitted with a stroke in 2023, manifested by right sided weakness and dysarthria. He received TNK. CTA showed left M1 occlusion and he underwent thrombectomy. Symptoms improved. ECHO 3/26 showed LV c/f apical hypertrophic CM w/ apical pseudoaneurysm, EF 50%, and segmental LV wall motion abnormalities. He had no history of syncope. Reports an uncle may have  of a heart attack in his 50s. All 3 brothers have HOCM. His last angiogram was in  when diagnosed with HOCM but was told he had clean coronaries. Cardiology consulted for management and possible AC therapy. EP was consulted for consideration of ICD in a patient with HOCM and apical aneurysm. Recent MRI noted apical HCM and an apical aneurysm. No LGE. He and I had a long conversation. He elected to proceed with ICD implantations.      
Mr. 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 He was admitted with a stroke in 2023, manifested by right sided weakness and dysarthria. He received TNK. CTA showed left M1 occlusion and he underwent thrombectomy. Symptoms improved. ECHO 3/26 showed LV c/f apical hypertrophic CM w/ apical pseudoaneurysm, EF 50%, and segmental LV wall motion abnormalities. He had no history of syncope. Reports an uncle may have  of a heart attack in his 50s. All 3 brothers have HOCM. His last angiogram was in  when diagnosed with HOCM but was told he had clean coronaries. Cardiology consulted for management and possible AC therapy. EP was consulted for consideration of ICD in a patient with HOCM and apical aneurysm. Recent MRI noted apical HCM and an apical aneurysm. No LGE. After extensive conversation with Dr. Good he elected proceed with ICD implantation.      My interpretation of today's ECG sinus rhythm with PACs and LVH  
No

## 2023-11-30 NOTE — Clinical Note
A dressing was applied to the incision. TO BE APPLIED BY PACU NURSE 30 MIN AFTER DERMABOND APPLICATION

## 2023-11-30 NOTE — PLAN OF CARE
Patient arrived to room. PIV placed. Admit assessment completed. Plan of care discussed with patient. Call bell within reach. Family at bedside.

## 2023-11-30 NOTE — PROGRESS NOTES
Patient admitted to recovery see [facility] for complete assessment pacu bcg's maintained safety measures verified patient instructed on pain scale and patient verbalized understanding. Called for EKG and it was done and in chart and called for stat chest xray. Called patient's wife and updated on patient location.

## 2023-11-30 NOTE — HOSPITAL COURSE
Patient underwent successful implantation of single chamber ICD for primary prevention for apical hypertrophic cardiomyopathy without evidence of complications intra- and post-procedure. CXR shows appropriate lead placement without acute cardiopulmonary process. ECG without evidence of device malfunction.     EP medications at discharge:  Initiation of doxycyline 100 mg BID x 5 days.  Patient was instructed to hold their anticoagulation for 5 days (when they complete their antibiotics)     Patient given activity restrictions and warning signs/symptoms to monitor for, including chest pain, shortness of breath, fevers, or evidence of complications at the generator site [swelling, drainage, redness, tenderness, or warmth]. They were instructed to seek immediate medical attention if these occur and to contract the EP department. Follow up with device clinic in 1 week. No CP, SOB, or complications at the generator site on discharge. All questions and concerns answered prior to discharge.     --------------------------------------------------------------------------------

## 2023-11-30 NOTE — SUBJECTIVE & OBJECTIVE
Past Medical History:   Diagnosis Date    Cancer 2022    prostate    HLD (hyperlipidemia)     Hypertension     Sleep apnea        Past Surgical History:   Procedure Laterality Date    CEREBRAL ANGIOGRAM N/A 03/26/2023    Procedure: ANGIOGRAM-CEREBRAL;  Surgeon: Jocelyne Surgeon;  Location: [facility];  Service: Anesthesiology;  Laterality: N/A;    PROSTATECTOMY         Review of patient's allergies indicates:  No Known Allergies    No current facility-administered medications on file prior to encounter.     Current Outpatient Medications on File Prior to Encounter   Medication Sig    ascorbic acid, vitamin C, (VITAMIN C) 1000 MG tablet 1 tablet.    atorvastatin (LIPITOR) 20 MG tablet Take 1 tablet (20 mg total) by mouth every evening.    buPROPion (WELLBUTRIN XL) 150 MG TB24 tablet Take 150 mg by mouth once daily.    carvediloL (COREG) 25 MG tablet Take 1 tablet (25 mg total) by mouth 2 (two) times daily.    cetirizine (ZYRTEC) 10 mg Cap 1 tablet.    FLUoxetine 10 MG capsule Take by mouth.    telmisartan (MICARDIS) 80 MG Tab Take 1 tablet (80 mg total) by mouth every evening.    turmeric 400 mg Cap as directed    apixaban (ELIQUIS) 5 mg Tab Take 1 tablet (5 mg total) by mouth 2 (two) times daily.    zinc gluconate 50 mg tablet 1 tablet.     Family History       Problem Relation (Age of Onset)    Hypertension Brother          Tobacco Use    Smoking status: Never    Smokeless tobacco: Not on file   Substance and Sexual Activity    Alcohol use: Yes     Alcohol/week: 18.0 standard drinks of alcohol     Types: 18 Drinks containing 0.5 oz of alcohol per week    Drug use: Never    Sexual activity: Not on file     Review of Systems   All other systems reviewed and are negative.    Objective:     Vital Signs (Most Recent):  Temp: 97.7 °F (36.5 °C) (11/30/23 0811)  Pulse: (!) 55 (11/30/23 0811)  Resp: 16 (11/30/23 0811)  BP: (!) 149/91 (11/30/23 0812)  SpO2: 99 % (11/30/23 0811) Vital Signs (24h Range):  Temp:  [97.7 °F (36.5 °C)]  97.7 °F (36.5 °C)  Pulse:  [55] 55  Resp:  [16] 16  SpO2:  [99 %] 99 %  BP: (141-149)/(85-91) 149/91       Weight: 82.1 kg (181 lb)  Body mass index is 28.35 kg/m².    SpO2: 99 %        Physical Exam  Vitals and nursing note reviewed.   Constitutional:       Appearance: Normal appearance.   Cardiovascular:      Rate and Rhythm: Normal rate and regular rhythm.      Pulses: Normal pulses.      Heart sounds: Normal heart sounds.   Pulmonary:      Effort: Pulmonary effort is normal.   Musculoskeletal:      Right lower leg: No edema.      Left lower leg: No edema.   Skin:     General: Skin is warm.   Neurological:      Mental Status: He is alert.            Significant Labs: All pertinent lab results from the last 24 hours have been reviewed.

## 2023-11-30 NOTE — BRIEF OP NOTE
Attending: Juan Good MD  Date of Procedure: 11/30/23    Post-operative Diagnosis: apical hypertrophy cardiomyopathy    Procedure Performed: single chamber ICD implant    Description of Procedure: The patient was brought to the EP lab in the fasting state. Prepped and draped in sterile fashion. Safety timeout was performed. Sedation administered by anesthesia staff. Selective venogram of the left axillary and cephalic veins performed via left ac IV. Lidocaine used for local anesthetic. Fluoroscopic guided axillary access utilized. Guide/J Wire advanced and confirmed in IVC. Incision made. Blunt and electrocautery dissection performed. Pocket made and washed with antibiotic solution. Peel away sheath advanced over J wire. ICD lead advanced into RV after confirming in the IVC first under fluoroscopy. R waves and injury pattern adequate. Lead fixed into place and sutured in place. Pocket washed again using antibiotic solution. Lead connected to generator. Generator placed into pocket and washed with antibiotic solution. Deep layer closed with interrupted 3-0 suture. Intermediate layer closed with running 3-0 suture. Superficial layer closed with running 4-0 suture. Skin closed with Dermabond. Meplex dressing to be placed after dermabond has dried.     EBL: <10 mL    Specimens: None  Complications: no immediate    Post-CIED implantation instructions:  Doxycycline 100 mg BID x 5 days   Avoid all heparin products (e.g., DOACs, enoxaparin, heparin) for 5 days following implant. If the patient is taking coumadin, this can be continued uninterrupted  CXR & ECG (ordered)  Mepilex (square light brown bandage) should be removed the morning after the device implant by patient (if discharged home) or EP (if hospitalized). Patient can shower after the Mepilex dressing is removed  Sling to arm for first 48 hours continuously, then only nightly for 6 weeks  No lifting elbow above shoulder height on arm ipsilateral to implant  device for 6 weeks  No lifting over 5 lbs. for 2 weeks with arm ipsilateral to implanted device  No driving for 1 week if patient uses arm contralateral to implantation and 4 weeks if patient uses arm ipsilateral to implantation  Patient can shower starting post-procedure day 1. Do not submerge surgical site for 1 month (e.g., bathing or swimming)  Patient will be scheduled for device clinic follow up in 1 week (if has already scheduled) to assess surgical site and device interrogation  Please contact EP for any questions or concerns at [phone] or page EP fellow on call  Patient is to seek immediate medical attention for acute onset of chest pain, shortness of breath, syncope, or evidence of surgical site infection or hematoma.    Plan for discharge following bedrest if patient tolerating PO intake, voiding, and ambulatory without evidence of complications     The attending physician was present for entire duration of the procedure    Kayce Dominguez MD, PGY7  Electrophysiology

## 2023-11-30 NOTE — NURSING TRANSFER
Nursing Transfer Note      11/30/2023   3:27 PM    Nurse giving handoff:killian quinteros   Nurse receiving handoff:mark quinteros     Reason patient is being transferred: d/c critieria met     Transfer To: [facility]    Transfer via stretcher    Transfer with cardiac monitoring and registered box     Transported by killian rn     Transfer Vital Signs:  Blood Pressure:see epic   Heart Rate:see epic   O2:room air   Temperature:see epic   Respirations:see epic     Telemetry: yes   Order for Tele Monitor? Yes    Additional Lines: n/a    4eyes on Skin: yes in recovery     Medicines sent: none     Any special needs or follow-up needed: pressure dressing remove in am     Patient belongings transferred with patient: Yes    Chart send with patient: Yes    Notified: reported to shareace rn     Patient reassessed at: see epic  (date, time)  1  Upon arrival to floor: to room no complaints no distress noted.

## 2023-11-30 NOTE — PLAN OF CARE
Received report from Dawna cevallos pacu RN. Patient s/p ICD placement with left upper chest incision, AAOx3. VSS, no c/o pain or discomfort at this time, resp even and unlabored on RA. CHANDAN in sling. Primary dermabond, mepilex, gauze/pressure dressing to left upper chest is CDI. No active bleeding. No hematoma noted. Post procedure protocol reviewed with patient and patient's family. Understanding verbalized. Palpable pulses x 4 extremities. Family members at bedside. Nurse call bell within reach. Monitoring per post procedure protocol.

## 2023-12-04 NOTE — ANESTHESIA POSTPROCEDURE EVALUATION
Anesthesia Post Evaluation    Patient: Peter Munoz    Procedure(s) Performed: Procedure(s) (LRB):  Insertion, ICD, Single Chamber (Left)    Final Anesthesia Type: general      Patient location during evaluation: PACU  Patient participation: Yes- Able to Participate  Level of consciousness: awake and alert  Post-procedure vital signs: reviewed and stable  Pain management: adequate  Airway patency: patent    PONV status at discharge: No PONV  Anesthetic complications: no      Cardiovascular status: blood pressure returned to baseline  Respiratory status: unassisted  Hydration status: euvolemic  Follow-up not needed.              Vitals Value Taken Time   /86 11/30/23 1610   Temp 36.2 °C (97.2 °F) 11/30/23 1520   Pulse 58 11/30/23 1610   Resp 18 11/30/23 1610   SpO2 97 % 11/30/23 1520         No case tracking events are documented in the log.      Pain/Bigg Score: No data recorded

## 2023-12-08 ENCOUNTER — CLINICAL SUPPORT (OUTPATIENT)
Dept: CARDIOLOGY | Facility: HOSPITAL | Age: 68
End: 2023-12-08
Attending: INTERNAL MEDICINE
Payer: MEDICARE

## 2023-12-08 DIAGNOSIS — I42.2 HYPERTROPHIC CARDIOMYOPATHY: ICD-10-CM

## 2023-12-08 PROCEDURE — 93282 CARDIAC DEVICE CHECK - IN CLINIC & HOSPITAL: ICD-10-PCS | Mod: 26,,, | Performed by: INTERNAL MEDICINE

## 2023-12-08 PROCEDURE — 93282 PRGRMG EVAL IMPLANTABLE DFB: CPT

## 2023-12-08 PROCEDURE — 93282 PRGRMG EVAL IMPLANTABLE DFB: CPT | Mod: 26,,, | Performed by: INTERNAL MEDICINE

## 2023-12-11 LAB — OHS CV DC REMOTE DEVICE TYPE: NORMAL

## 2024-01-11 DIAGNOSIS — I42.2 APICAL VARIANT HYPERTROPHIC CARDIOMYOPATHY: ICD-10-CM

## 2024-01-11 DIAGNOSIS — I42.2 HYPERTROPHIC CARDIOMYOPATHY: Primary | ICD-10-CM

## 2024-02-28 ENCOUNTER — CLINICAL SUPPORT (OUTPATIENT)
Dept: CARDIOLOGY | Facility: HOSPITAL | Age: 69
End: 2024-02-28
Attending: INTERNAL MEDICINE
Payer: MEDICARE

## 2024-02-28 ENCOUNTER — CLINICAL SUPPORT (OUTPATIENT)
Dept: CARDIOLOGY | Facility: HOSPITAL | Age: 69
End: 2024-02-28
Payer: MEDICARE

## 2024-02-28 DIAGNOSIS — I42.2 OTHER HYPERTROPHIC CARDIOMYOPATHY: ICD-10-CM

## 2024-02-28 DIAGNOSIS — Z95.810 PRESENCE OF AUTOMATIC (IMPLANTABLE) CARDIAC DEFIBRILLATOR: ICD-10-CM

## 2024-02-28 PROCEDURE — 93295 DEV INTERROG REMOTE 1/2/MLT: CPT | Mod: ,,, | Performed by: INTERNAL MEDICINE

## 2024-03-21 PROBLEM — Z95.810 ICD (IMPLANTABLE CARDIOVERTER-DEFIBRILLATOR) IN PLACE: Status: ACTIVE | Noted: 2024-03-21

## 2024-03-21 NOTE — PROGRESS NOTES
Mr. Munoz is a patient of Dr. Good and was last seen in clinic 2023.      Subjective:   Patient ID:  Peter Munoz is a 68 y.o. male who presents for follow up of Atrial Fibrillation and ICD  .     HPI:    Mr. Munoz is a 68 y.o. male with HTN, prostate CA, apical hypertrophic CMP, CVA, ICD here for follow up after ICD implantation.     Background:    Primary Cardiologist: Isael Brink MD    Mr. 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 He was admitted with a stroke in 2023, manifested by right sided weakness and dysarthria. He received TNK. CTA showed left M1 occlusion and he underwent thrombectomy. Symptoms improved. ECHO 3/26 showed LV c/f apical hypertrophic CM w/ apical pseudoaneurysm, EF 50%, and segmental LV wall motion abnormalities. He had no history of syncope. Reports an uncle may have  of a heart attack in his 50s. All 3 brothers have HOCM. His last angiogram was in  when diagnosed with HOCM but was told he had clean coronaries. Cardiology consulted for management and possible AC therapy. EP was consulted for consideration of ICD in a patient with HOCM and apical aneurysm. He and I had a long conversation. He elected to think about ICD. He cancelled follow-up with me post-discharge.    2023: Mr. Munoz returns for follow-up discussion regarding ICD implantation. Recent MRI noted apical HCM and an apical aneurysm. No LGE.  ECG sinus rhythm with PACs and LVH  We discussed the etiology and pathophysiology of sudden cardiac death in a patient population such as his and how an ICD may provide mortality benefit. We discussed the long-term implications of device implantation including infection risk, inappropriate shocks, and device/lead malfunction requiring further procedures. We discussed the alternatives, benefits and risks of the procedure including pain, infection, bleeding, injury  to lung causing pneumothorax requiring tube placement, injury to heart valves, puncture of the heart leading to pericardial effusion or tamponade requiring tube drainage, heart attack, stroke and death. All questions answered. He agrees to proceed.    Plan  Single chamber ICD implant, Biotronic DX lead  Anesthesia  Hold eliquis 48 hours prior (discussed small stroke risk while holding eliquis)    Update (03/25/2024):    11/30/2023: Successful implantation of ICD Single placed for primary intervention in setting of apical hypertrophic cardiomyopathy with an apical aneurysm     Today he says he feels at baseline. No new cardiac complaints. No CP, worsening CONTRERAS, edema, palps, LH, syncope reported.    He is currently taking eliquis 5mg BID for CVA prophylaxis (hx of CVA and apical aneurysm) and denies significant bleeding episodes. He is currently being treated with carvedilol 2\5mg BID for HR control.  Kidney function is stable, with a creatinine of 1.1 on 11/27/2023.    Device Interrogation (3/25/2024) reveals an intrinsic SB (54bpm) with stable lead and device function. No arrhythmias or treated episodes were noted.  He paces 0% in the RV. Estimated battery longevity JOHN.     I have personally reviewed the patient's EKG today, which shows sinus rhythm at 66bpm. CO interval is 156. QRS is 84. QTc is 457.    Relevant Cardiac Test Results:    2D Echo (3/26/2023):  Left ventricle with morphology consistent with apical hypertrophic cardiomyopathy with apical pseudoaneurysm vs. atypical takotsubo cardiomyopathy, suspect former.  Mild left atrial enlargement.  The left ventricle is normal in size with low normal systolic function.  The estimated ejection fraction is 50%.  Indeterminate left ventricular diastolic function.  There are segmental left ventricular wall motion abnormalities.  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  Intermediate central venous pressure (8 mmHg).  The estimated PA systolic pressure is  17 mmHg.  Mild tricuspid regurgitation.    Current Outpatient Medications   Medication Sig    apixaban (ELIQUIS) 5 mg Tab Take 1 tablet (5 mg total) by mouth 2 (two) times daily.    ascorbic acid, vitamin C, (VITAMIN C) 1000 MG tablet 1 tablet.    atorvastatin (LIPITOR) 20 MG tablet Take 1 tablet (20 mg total) by mouth every evening.    buPROPion (WELLBUTRIN SR) 150 MG TBSR 12 hr tablet Take 1 tablet by mouth twice a day    buPROPion (WELLBUTRIN XL) 150 MG TB24 tablet Take 150 mg by mouth once daily.    carvediloL (COREG) 25 MG tablet Take 1 tablet (25 mg total) by mouth 2 (two) times daily.    cetirizine (ZYRTEC) 10 mg Cap 1 tablet.    FLUoxetine 10 MG capsule Take by mouth.    telmisartan (MICARDIS) 80 MG Tab Take 1 tablet (80 mg total) by mouth every evening.    turmeric 400 mg Cap as directed    zinc gluconate 50 mg tablet 1 tablet.     No current facility-administered medications for this visit.       Review of Systems   Constitutional: Negative for malaise/fatigue.   Cardiovascular:  Negative for chest pain, dyspnea on exertion, irregular heartbeat, leg swelling and palpitations.   Respiratory:  Negative for shortness of breath.    Hematologic/Lymphatic: Negative for bleeding problem.   Skin:  Negative for rash.   Musculoskeletal:  Negative for myalgias.   Gastrointestinal:  Negative for hematemesis, hematochezia and nausea.   Genitourinary:  Negative for hematuria.   Neurological:  Negative for light-headedness.   Psychiatric/Behavioral:  Negative for altered mental status.    Allergic/Immunologic: Negative for persistent infections.       Objective:          BP (!) 142/92   Pulse 66   Wt 88.7 kg (195 lb 8.8 oz)   BMI 30.63 kg/m²     Physical Exam  Vitals and nursing note reviewed.   Constitutional:       Appearance: Normal appearance. He is well-developed.   HENT:      Head: Normocephalic.      Nose: Nose normal.   Eyes:      Pupils: Pupils are equal, round, and reactive to light.   Cardiovascular:       Rate and Rhythm: Normal rate and regular rhythm.   Pulmonary:      Effort: No respiratory distress.      Breath sounds: Normal breath sounds.   Chest:      Comments: Device to LUCW. Incision and pocket in good repair.    Musculoskeletal:         General: Normal range of motion.   Skin:     General: Skin is warm and dry.      Findings: No erythema.   Neurological:      Mental Status: He is alert and oriented to person, place, and time.   Psychiatric:         Speech: Speech normal.         Behavior: Behavior normal.           Lab Results   Component Value Date     11/27/2023    K 4.5 11/27/2023    MG 1.7 03/28/2023    BUN 20 11/27/2023    CREATININE 1.1 11/27/2023    ALT 22 05/01/2023    AST 16 05/01/2023    HGB 15.8 11/27/2023    HCT 46.2 11/27/2023    TSH 3.352 03/26/2023    LDLCALC 61.6 (L) 05/01/2023     Recent Labs   Lab 03/26/23  0143 11/27/23  0958   INR 1.1 1.1       Assessment:     1. ICD (implantable cardioverter-defibrillator) in place    2. Hypertrophic cardiomyopathy    3. Primary hypertension    4. History of CVA (cerebrovascular accident)      Plan:     In summary, Mr. Munoz is a 68 y.o. male with HTN, prostate CA, apical hypertrophic CMP, CVA, ICD here for follow up after ICD implantation.   He is 4 months s/p ICD implantaton. Mr. Munoz is doing well from a device perspective with stable lead and device function. No arrhythmia noted (Dx lead).  On eliquis for CVA prophylaxis. No RV pacing. No CHF symptoms. He follows with general cardiology.     Continue current medication regimen and device settings.   Follow up in device clinic as scheduled.   Follow up in EP clinic in 1 year, sooner as needed.     *A copy of this note has been sent to Dr. Good*    Follow up in about 1 year (around 3/25/2025).    ------------------------------------------------------------------    WILFREDO Abrams, NP-C  Cardiac Electrophysiology

## 2024-03-25 ENCOUNTER — HOSPITAL ENCOUNTER (OUTPATIENT)
Dept: CARDIOLOGY | Facility: CLINIC | Age: 69
Discharge: HOME | End: 2024-03-25
Payer: MEDICARE

## 2024-03-25 ENCOUNTER — CLINICAL SUPPORT (OUTPATIENT)
Dept: CARDIOLOGY | Facility: HOSPITAL | Age: 69
End: 2024-03-25
Attending: INTERNAL MEDICINE
Payer: MEDICARE

## 2024-03-25 ENCOUNTER — OFFICE VISIT (OUTPATIENT)
Dept: ELECTROPHYSIOLOGY | Facility: CLINIC | Age: 69
End: 2024-03-25
Payer: MEDICARE

## 2024-03-25 VITALS
DIASTOLIC BLOOD PRESSURE: 92 MMHG | HEART RATE: 66 BPM | WEIGHT: 195.56 LBS | SYSTOLIC BLOOD PRESSURE: 142 MMHG | BODY MASS INDEX: 30.63 KG/M2

## 2024-03-25 DIAGNOSIS — I42.2 HYPERTROPHIC CARDIOMYOPATHY: ICD-10-CM

## 2024-03-25 DIAGNOSIS — Z86.73 HISTORY OF CVA (CEREBROVASCULAR ACCIDENT): ICD-10-CM

## 2024-03-25 DIAGNOSIS — I10 PRIMARY HYPERTENSION: ICD-10-CM

## 2024-03-25 DIAGNOSIS — I42.2 APICAL VARIANT HYPERTROPHIC CARDIOMYOPATHY: ICD-10-CM

## 2024-03-25 DIAGNOSIS — Z95.810 ICD (IMPLANTABLE CARDIOVERTER-DEFIBRILLATOR) IN PLACE: Primary | ICD-10-CM

## 2024-03-25 LAB
OHS QRS DURATION: 84 MS
OHS QTC CALCULATION: 457 MS

## 2024-03-25 PROCEDURE — 99214 OFFICE O/P EST MOD 30 MIN: CPT | Mod: S$PBB,,, | Performed by: NURSE PRACTITIONER

## 2024-03-25 PROCEDURE — 93282 PRGRMG EVAL IMPLANTABLE DFB: CPT

## 2024-03-25 PROCEDURE — 99213 OFFICE O/P EST LOW 20 MIN: CPT | Mod: PBBFAC,25 | Performed by: NURSE PRACTITIONER

## 2024-03-25 PROCEDURE — 93282 PRGRMG EVAL IMPLANTABLE DFB: CPT | Mod: 26,,, | Performed by: INTERNAL MEDICINE

## 2024-03-25 PROCEDURE — 99999 PR PBB SHADOW E&M-EST. PATIENT-LVL III: CPT | Mod: PBBFAC,,, | Performed by: NURSE PRACTITIONER

## 2024-03-25 PROCEDURE — 93010 ELECTROCARDIOGRAM REPORT: CPT | Mod: S$PBB,,, | Performed by: INTERNAL MEDICINE

## 2024-03-25 PROCEDURE — 93005 ELECTROCARDIOGRAM TRACING: CPT | Mod: 59,PBBFAC | Performed by: INTERNAL MEDICINE

## 2024-03-25 RX ORDER — LORATADINE 10 MG/1
TABLET ORAL
COMMUNITY

## 2024-03-25 RX ORDER — PREDNISONE 5 MG/1
5 TABLET ORAL 2 TIMES DAILY
COMMUNITY

## 2024-03-25 RX ORDER — LOSARTAN POTASSIUM 100 MG/1
TABLET ORAL
COMMUNITY
End: 2024-03-25

## 2024-03-25 RX ORDER — ABIRATERONE 500 MG/1
1000 TABLET ORAL
COMMUNITY
Start: 2024-03-19

## 2024-03-26 LAB
OHS CV AF BURDEN PERCENT: < 1
OHS CV AF BURDEN PERCENT: < 1
OHS CV DC REMOTE DEVICE TYPE: NORMAL
OHS CV DC REMOTE DEVICE TYPE: NORMAL
OHS CV RV PACING PERCENT: 0 %
OHS CV RV PACING PERCENT: 0 %

## 2024-04-25 DIAGNOSIS — I10 PRIMARY HYPERTENSION: ICD-10-CM

## 2024-04-25 RX ORDER — TELMISARTAN 80 MG/1
80 TABLET ORAL NIGHTLY
Qty: 90 TABLET | Refills: 3 | Status: SHIPPED | OUTPATIENT
Start: 2024-04-25

## 2024-05-21 DIAGNOSIS — I63.9 STROKE: ICD-10-CM

## 2024-05-21 DIAGNOSIS — I42.2 APICAL VARIANT HYPERTROPHIC CARDIOMYOPATHY: ICD-10-CM

## 2024-05-21 RX ORDER — ATORVASTATIN CALCIUM 20 MG/1
20 TABLET, FILM COATED ORAL NIGHTLY
Qty: 90 TABLET | Refills: 3 | Status: SHIPPED | OUTPATIENT
Start: 2024-05-21

## 2024-05-29 ENCOUNTER — CLINICAL SUPPORT (OUTPATIENT)
Dept: CARDIOLOGY | Facility: HOSPITAL | Age: 69
End: 2024-05-29
Attending: INTERNAL MEDICINE
Payer: MEDICARE

## 2024-05-29 ENCOUNTER — CLINICAL SUPPORT (OUTPATIENT)
Dept: CARDIOLOGY | Facility: HOSPITAL | Age: 69
End: 2024-05-29
Payer: MEDICARE

## 2024-05-29 DIAGNOSIS — Z95.810 PRESENCE OF AUTOMATIC (IMPLANTABLE) CARDIAC DEFIBRILLATOR: ICD-10-CM

## 2024-05-29 DIAGNOSIS — I42.2 OTHER HYPERTROPHIC CARDIOMYOPATHY: ICD-10-CM

## 2024-05-29 PROCEDURE — 93295 DEV INTERROG REMOTE 1/2/MLT: CPT | Mod: ,,, | Performed by: INTERNAL MEDICINE

## 2024-06-14 LAB
OHS CV AF BURDEN PERCENT: < 1
OHS CV DC REMOTE DEVICE TYPE: NORMAL
OHS CV RV PACING PERCENT: 0 %

## 2024-07-23 ENCOUNTER — TELEPHONE (OUTPATIENT)
Dept: CARDIOLOGY | Facility: CLINIC | Age: 69
End: 2024-07-23
Payer: MEDICARE

## 2024-07-23 NOTE — TELEPHONE ENCOUNTER
----- Message from Isael Brink MD sent at 7/23/2024  4:42 PM CDT -----  Regarding: RE: High BP readings  I would tell him to continue for another 2 days and call us with blood pressures on Thursday, if still elevated then let us know and we can see if he needs any additional agents.    Also they should figure out who told him to stop and why?    Can't see anything on our end here    Isael  ----- Message -----  From: Viviana Marvin RN  Sent: 7/23/2024   4:17 PM CDT  To: Isael Brink MD; #  Subject: High BP readings                                 Per wife pt has been having /140 which I told her would be erroneous.  She states that pt stopped taking telmisartan 80 as instructed by a provider (does not know which).  He restarted it yesterday and this morning    At hem visit at [facility] on 7/17: 188/99  Spoke w. pt who gave me the following readings.  Date -- Time -- BP -- HR --   --  --  --  --  15-Apr --          -- 146/84 --  --      21-Jul --          -- 188/99 --  --  22-Jul -- mid aft -- 143/93 --  bef pills, am telmisartan  22-Jul -- 1100 -- 157/96 --  after pills, am telmisartan    He also takes carvedilol 25 bid. His telmisartan is current for us in Epic.    Please advise,

## 2024-07-25 ENCOUNTER — TELEPHONE (OUTPATIENT)
Dept: CARDIOLOGY | Facility: CLINIC | Age: 69
End: 2024-07-25
Payer: MEDICARE

## 2024-07-25 NOTE — TELEPHONE ENCOUNTER
----- Message from Ginette Rojas sent at 7/24/2024 11:39 AM CDT -----  Regarding: bp  Pt of Dr. Brink is calling you back today and says nothing has changed regarding his high bp readings and can be reached at [phone].    Thank you

## 2024-08-05 ENCOUNTER — TELEPHONE (OUTPATIENT)
Dept: CARDIOLOGY | Facility: CLINIC | Age: 69
End: 2024-08-05
Payer: MEDICARE

## 2024-08-05 DIAGNOSIS — I10 PRIMARY HYPERTENSION: Primary | ICD-10-CM

## 2024-08-05 RX ORDER — HYDROCHLOROTHIAZIDE 12.5 MG/1
12.5 TABLET ORAL DAILY
Qty: 30 TABLET | Refills: 11 | Status: SHIPPED | OUTPATIENT
Start: 2024-08-05 | End: 2025-08-05

## 2024-08-06 ENCOUNTER — TELEPHONE (OUTPATIENT)
Dept: CARDIOLOGY | Facility: CLINIC | Age: 69
End: 2024-08-06
Payer: MEDICARE

## 2024-08-28 ENCOUNTER — CLINICAL SUPPORT (OUTPATIENT)
Dept: CARDIOLOGY | Facility: HOSPITAL | Age: 69
End: 2024-08-28
Attending: INTERNAL MEDICINE
Payer: MEDICARE

## 2024-08-28 ENCOUNTER — CLINICAL SUPPORT (OUTPATIENT)
Dept: CARDIOLOGY | Facility: HOSPITAL | Age: 69
End: 2024-08-28
Payer: MEDICARE

## 2024-08-28 DIAGNOSIS — I42.2 OTHER HYPERTROPHIC CARDIOMYOPATHY: ICD-10-CM

## 2024-08-28 DIAGNOSIS — Z95.810 PRESENCE OF AUTOMATIC (IMPLANTABLE) CARDIAC DEFIBRILLATOR: ICD-10-CM

## 2024-08-29 LAB
OHS CV AF BURDEN PERCENT: < 1
OHS CV DC REMOTE DEVICE TYPE: NORMAL
OHS CV RV PACING PERCENT: 0 %

## 2024-10-01 ENCOUNTER — OFFICE VISIT (OUTPATIENT)
Dept: CARDIOLOGY | Facility: CLINIC | Age: 69
End: 2024-10-01
Payer: MEDICARE

## 2024-10-01 DIAGNOSIS — I42.2 APICAL VARIANT HYPERTROPHIC CARDIOMYOPATHY: ICD-10-CM

## 2024-10-01 DIAGNOSIS — I42.2 HYPERTROPHIC CARDIOMYOPATHY: Primary | ICD-10-CM

## 2024-10-01 DIAGNOSIS — Z85.46 HISTORY OF MALIGNANT NEOPLASM OF PROSTATE: ICD-10-CM

## 2024-10-01 DIAGNOSIS — Z95.810 ICD (IMPLANTABLE CARDIOVERTER-DEFIBRILLATOR) IN PLACE: ICD-10-CM

## 2024-10-01 DIAGNOSIS — I10 PRIMARY HYPERTENSION: ICD-10-CM

## 2024-10-01 PROCEDURE — 99214 OFFICE O/P EST MOD 30 MIN: CPT | Mod: 95,,, | Performed by: INTERNAL MEDICINE

## 2024-10-01 RX ORDER — HYDROCHLOROTHIAZIDE 25 MG/1
25 TABLET ORAL DAILY
Qty: 90 TABLET | Refills: 5 | Status: SHIPPED | OUTPATIENT
Start: 2024-10-01 | End: 2025-10-01

## 2024-10-01 NOTE — PROGRESS NOTES
Subjective:   Chief Complaint:  Hypertrophic cardiomyopathy, hypertension     History of Present Illness: Peter Munoz is a 68 y.o. 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    Interval History:  Successfully had ICD implant.  He has started a new androgen receptor blocker Zytiga for his prostate cancer, PSA has responded significantly, but he has gained approximately 15-20 lb over the course the past year.  Also has noticed some intermittent swelling, may still be on prednisone unclear.  He has had increasing blood pressure over the course the past 2 months, reached out to us last month and we started him on hydrochlorothiazide 12.5 in addition to telmisartan 80 mg he was already on and carvedilol 25 mg he was already on.  Blood pressure is improved slightly, but still with significantly elevated blood pressure diastolics in the 90s.  Denies any cramping, no lightheadedness.  Exercising by walking 1-2 miles a day in the mall.  Denies any chest pain, no dyspnea on exertion, no shortness of breath.  No device discharges.  No palpitations.  Denies any significant bleeding on Eliquis.    11/06/2023  Since we saw him last we obtained event monitor which showed PACs, PVCs, but no pathological arrhythmias.  We obtained a cardiac MRI which confirmed presence of apical aneurysm however no significant late gadolinium hyperenhancement, maximum wall thickness 22 mm.  Blood pressure elevated today, he checks at home and reports does occasionally run in the 140s over 90s, compliant with antihypertensives carvedilol 12.5 b.i.d. and telmisartan 80 mg.  He does endorse occasional salty meals however attempts to watch his diet somewhat.  Exercises walking 2-3 miles daily denies any significant chest discomfort, no dyspnea on exertion, no shortness of breath, no  orthopnea, no lower extremity edema, no syncope, no presyncope, no palpitations.  Compliant with atorvastatin most recent LDL checked 6 months prior 61.  Occasional scrapes and bruises from apixaban but otherwise tolerating well.  No additional focal neurological deficits    5/3/2023  He was in his usual state of health when he developed sudden onset aphasia and right-sided weakness, was taken for emergent interventional thrombectomy.  Symptoms significantly improved, since discharge she does report some mild difficulty with vision at long distance, and some gait abnormalities, but otherwise doing well.  Is walking long distances on a regular basis has lost 20 lb, no jogging or aerobic activities yet.  Has not followed with vascular Neurology at, and ask about clearance needed for driving.  Compliant with atorvastatin and most recent LDL 61, lipids drawn during hospitalization with spurious hypertriglyceridemia.  During his hospitalization for CVA he was diagnosed with apical aneurysm, had previous diagnosis of apical hypertrophic cardiomyopathy for many years but aneurysm a new diagnosis.  He was started on Eliquis, tolerating well denies any bleeding.  Denies any dyspnea on exertion, no orthopnea, no lower extremity edema.  Blood pressure at home typically in the 130s systolic over 80s diastolic.  Denies any lightheadedness noted to have bradycardia in the 50s.  With apical aneurysm during hospitalization he had no significant arrhythmias or ectopy on telemetry.  Due for follow-up with Dr. Good.    Visit date not found    Dx:  Embolic CVA 03/26/2023  Apical hypertrophic cardiomyopathy   Apical aneurysm   Hyperlipidemia   Hypertension  Prostate cancer    Medications:  Outpatient Encounter Medications as of 10/1/2024   Medication Sig Dispense Refill    abiraterone (ZYTIGA) 500 mg Tab Take 1,000 mg by mouth.      apixaban (ELIQUIS) 5 mg Tab Take 1 tablet (5 mg total) by mouth 2 (two) times daily. 60 tablet 11     ascorbic acid, vitamin C, (VITAMIN C) 1000 MG tablet 1 tablet.      atorvastatin (LIPITOR) 20 MG tablet Take 1 tablet (20 mg total) by mouth every evening. 90 tablet 3    buPROPion (WELLBUTRIN SR) 150 MG TBSR 12 hr tablet Take 1 tablet by mouth twice a day 180 tablet 3    buPROPion (WELLBUTRIN SR) 150 MG TBSR 12 hr tablet Take 2 tablets (300 mg total) by mouth every morning AND 2 tablets (300 mg total) every evening. 320 tablet 5    buPROPion (WELLBUTRIN XL) 150 MG TB24 tablet Take 150 mg by mouth once daily.      carvediloL (COREG) 25 MG tablet Take 1 tablet (25 mg total) by mouth 2 (two) times daily. 180 tablet 3    cetirizine (ZYRTEC) 10 mg Cap 1 tablet.      FLUoxetine 10 MG capsule Take by mouth.      hydroCHLOROthiazide (HYDRODIURIL) 25 MG tablet Take 1 tablet (25 mg total) by mouth once daily. 90 tablet 5    loratadine (CLARITIN) 10 mg tablet 1 tablet Orally Once a day for 30 day(s)      predniSONE (DELTASONE) 5 MG tablet Take 5 mg by mouth 2 (two) times daily.      telmisartan (MICARDIS) 80 MG Tab TAKE ONE TABLET BY MOUTH EVERY EVENING 90 tablet 3    turmeric 400 mg Cap as directed      zinc gluconate 50 mg tablet 1 tablet.      [DISCONTINUED] hydroCHLOROthiazide (HYDRODIURIL) 12.5 MG Tab Take 1 tablet (12.5 mg total) by mouth once daily. 30 tablet 11     No facility-administered encounter medications on file as of 10/1/2024.     Social History:  Peter reports that he has never smoked. He does not have any smokeless tobacco history on file. He reports current alcohol use of about 18.0 standard drinks of alcohol per week. He reports that he does not use drugs.    Objective:   There were no vitals taken for this visit.    Physical Exam   Constitutional:  Non-toxic appearance.   Abdominal: Normal appearance.   Neurological: He is alert.   Skin: He is not diaphoretic.      EKG:  My independent visualization of most recent EKG is normal sinus rhythm, diffuse abnormal ST segments and diffuse T-wave inversions  consistent with apical HCM.    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 atypical takotsubo cardiomyopathy, suspect former.  Mild left atrial enlargement.  The left ventricle is normal in size with low normal systolic function.  The estimated ejection fraction is 50%.  Indeterminate left ventricular diastolic function.  There are segmental left ventricular wall motion abnormalities.  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  Intermediate central venous pressure (8 mmHg).  The estimated PA systolic pressure is 17 mmHg.  Mild tricuspid regurgitation.     Lipids:  Recent Labs   Lab 05/01/23  1005   LDL Cholesterol 61.6 L   HDL 31 L      Renal:  Recent Labs   Lab 10/02/24  0926   Creatinine 1.3   Potassium 3.8   CO2 27   BUN 17     Liver:  Recent Labs   Lab 05/01/23  1005   AST 16   ALT 22     Event monitor   06/12/2023   At baseline, in the absence of symptoms, the rhythm was sinus at over the course of the month there were many activations, though no symptom was selected/reported.  Each showed sinus rhythm, sometimes with PVCs and PACs, sometimes atrial bigeminy. Heart rates ranged between 44 and 74 beats per minute.    Cardiac MRI  09/12/2023   Conclusion:    LV volumes are normal. The LV mass distribution and contraction pattern is consistent with apical HOCM. The maximum thickness of hypertrophy is 22mm on the SAX. A small aneurysm is present in the apex. LVEF = 60%.   RV end diastolic volume is decreased. RVEF = 54%.  Left atrial enlargement  No LGE appreciated     Assessment:     1. Hypertrophic cardiomyopathy    2. Primary hypertension    3. Apical variant hypertrophic cardiomyopathy    4. ICD (implantable cardioverter-defibrillator) in place    5. History of malignant neoplasm of prostate      Plan:   1. Primary hypertension  Blood pressure above goal, on max dose telmisartan max dose carvedilol.  Gradient from  apical hypertrophic cardiomyopathy does not have obstructive outflow tract gradient.  Has tolerated hydrochlorothiazide 12.5 well thus far, will increase to 25 mg and follow up with BMP to evaluate electrolytes.  - hydroCHLOROthiazide (HYDRODIURIL) 25 MG tablet; Take 1 tablet (25 mg total) by mouth once daily.  Dispense: 90 tablet; Refill: 5  - Basic metabolic panel; Future  - Magnesium; Future    2. Hypertrophic cardiomyopathy (Primary)  As above, status post ICD implantation, no device discharges.  No heart failure symptoms at this time NYHA class 1.    3. Apical variant hypertrophic cardiomyopathy  As above    4. ICD (implantable cardioverter-defibrillator) in place      5. History of malignant neoplasm of prostate  Discussed that prostate cancer medication likely contributing to increased blood pressure along with 15-20 lb weight gain over the course the past year.  Encouraged regular physical exercise, increasing intensity of exercise to where he can not have a conversation while he is exercising.  salt avoidance, healthy eating, weight loss if possible.      Follow up in 1 year      Isael Brink MD [facility]    The patient location is:  [place]  The chief complaint leading to consultation is:  Hypertrophic cardiomyopathy hypertension    Visit type: audiovisual    Face to Face time with patient:  30  Forty-five minutes of total time spent on the encounter, which includes face to face time and non-face to face time preparing to see the patient (eg, review of tests), Obtaining and/or reviewing separately obtained history, Documenting clinical information in the electronic or other health record, Independently interpreting results (not separately reported) and communicating results to the patient/family/caregiver, or Care coordination (not separately reported).         Each patient to whom he or she provides medical services by telemedicine is:  (1) informed of the relationship between the physician and patient  and the respective role of any other health care provider with respect to management of the patient; and (2) notified that he or she may decline to receive medical services by telemedicine and may withdraw from such care at any time.    Notes:

## 2024-10-02 ENCOUNTER — LAB VISIT (OUTPATIENT)
Dept: LAB | Facility: HOSPITAL | Age: 69
End: 2024-10-02
Attending: INTERNAL MEDICINE
Payer: MEDICARE

## 2024-10-02 DIAGNOSIS — I10 PRIMARY HYPERTENSION: ICD-10-CM

## 2024-10-02 LAB
ANION GAP SERPL CALC-SCNC: 11 MMOL/L (ref 8–16)
BUN SERPL-MCNC: 17 MG/DL (ref 8–23)
CALCIUM SERPL-MCNC: 10 MG/DL (ref 8.7–10.5)
CHLORIDE SERPL-SCNC: 103 MMOL/L (ref 95–110)
CO2 SERPL-SCNC: 27 MMOL/L (ref 23–29)
CREAT SERPL-MCNC: 1.3 MG/DL (ref 0.5–1.4)
EST. GFR  (NO RACE VARIABLE): >60 ML/MIN/1.73 M^2
GLUCOSE SERPL-MCNC: 108 MG/DL (ref 70–110)
MAGNESIUM SERPL-MCNC: 1.9 MG/DL (ref 1.6–2.6)
POTASSIUM SERPL-SCNC: 3.8 MMOL/L (ref 3.5–5.1)
SODIUM SERPL-SCNC: 141 MMOL/L (ref 136–145)

## 2024-10-02 PROCEDURE — 36415 COLL VENOUS BLD VENIPUNCTURE: CPT | Mod: PN | Performed by: INTERNAL MEDICINE

## 2024-10-02 PROCEDURE — 80048 BASIC METABOLIC PNL TOTAL CA: CPT | Performed by: INTERNAL MEDICINE

## 2024-10-02 PROCEDURE — 83735 ASSAY OF MAGNESIUM: CPT | Performed by: INTERNAL MEDICINE

## 2024-11-14 DIAGNOSIS — I10 PRIMARY HYPERTENSION: ICD-10-CM

## 2024-11-15 RX ORDER — CARVEDILOL 25 MG/1
25 TABLET ORAL 2 TIMES DAILY
Qty: 180 TABLET | Refills: 3 | Status: SHIPPED | OUTPATIENT
Start: 2024-11-15

## 2024-11-27 ENCOUNTER — CLINICAL SUPPORT (OUTPATIENT)
Dept: CARDIOLOGY | Facility: HOSPITAL | Age: 69
End: 2024-11-27
Attending: INTERNAL MEDICINE
Payer: MEDICARE

## 2024-11-27 ENCOUNTER — CLINICAL SUPPORT (OUTPATIENT)
Dept: CARDIOLOGY | Facility: HOSPITAL | Age: 69
End: 2024-11-27
Payer: MEDICARE

## 2024-11-27 DIAGNOSIS — I42.2 OTHER HYPERTROPHIC CARDIOMYOPATHY: ICD-10-CM

## 2024-11-27 DIAGNOSIS — Z95.810 PRESENCE OF AUTOMATIC (IMPLANTABLE) CARDIAC DEFIBRILLATOR: ICD-10-CM

## 2024-11-27 PROCEDURE — 93295 DEV INTERROG REMOTE 1/2/MLT: CPT | Mod: ,,, | Performed by: INTERNAL MEDICINE

## 2024-12-13 LAB
OHS CV AF BURDEN PERCENT: < 1
OHS CV DC REMOTE DEVICE TYPE: NORMAL
OHS CV RV PACING PERCENT: 0 %

## 2025-01-07 ENCOUNTER — TELEPHONE (OUTPATIENT)
Dept: CARDIOLOGY | Facility: CLINIC | Age: 70
End: 2025-01-07
Payer: MEDICARE

## 2025-01-07 NOTE — TELEPHONE ENCOUNTER
Spoke w. pt. Told him that physical therapy is not usually ordered by cardiology. He should refer to PCP/ neuro/ oncologist. He verbalized understanding.

## 2025-01-07 NOTE — TELEPHONE ENCOUNTER
----- Message from OMAR Michelle sent at 1/7/2025  8:59 AM CST -----    ----- Message -----  From: Aram Lockwood  Sent: 1/6/2025   3:05 PM CST  To: [place] [facility] Staff    Patient is calling in regards to getting orders for PT for a stroke a year ago. Stated he is having problems walking. Please call back for at [phone] for further questions.     LOV 10/24    Thank you

## 2025-01-13 ENCOUNTER — TELEPHONE (OUTPATIENT)
Dept: NEUROLOGY | Facility: CLINIC | Age: 70
End: 2025-01-13
Payer: MEDICARE

## 2025-01-13 NOTE — TELEPHONE ENCOUNTER
----- Message from Khushboo sent at 1/8/2025  1:39 PM CST -----  Type:  Sooner Apoointment Request    Caller is requesting a sooner appointment.    Name of Caller:wife of Mr Green   When is the first available appointment?Feb   Symptoms:walking issues since stoke   Would the patient rather a call back or a response via [facility]? call  Best Call Back Number:[phone] Esther  Additional Information: she wants to see if possible can the patient get physical therapy orders be sent so, he can start therapy since the first appt to see the provider in not until Feb 24

## 2025-02-24 ENCOUNTER — OFFICE VISIT (OUTPATIENT)
Dept: NEUROLOGY | Facility: CLINIC | Age: 70
End: 2025-02-24
Payer: MEDICARE

## 2025-02-24 VITALS
HEIGHT: 67 IN | SYSTOLIC BLOOD PRESSURE: 137 MMHG | DIASTOLIC BLOOD PRESSURE: 84 MMHG | WEIGHT: 195.56 LBS | HEART RATE: 73 BPM | BODY MASS INDEX: 30.69 KG/M2

## 2025-02-24 DIAGNOSIS — E78.2 MIXED HYPERLIPIDEMIA: ICD-10-CM

## 2025-02-24 DIAGNOSIS — I25.3 ANEURYSM OF APEX OF HEART DUE TO APICAL HYPERTROPHIC CARDIOMYOPATHY: ICD-10-CM

## 2025-02-24 DIAGNOSIS — I10 PRIMARY HYPERTENSION: ICD-10-CM

## 2025-02-24 DIAGNOSIS — Z95.810 ICD (IMPLANTABLE CARDIOVERTER-DEFIBRILLATOR) IN PLACE: ICD-10-CM

## 2025-02-24 DIAGNOSIS — Z85.46 HISTORY OF MALIGNANT NEOPLASM OF PROSTATE: ICD-10-CM

## 2025-02-24 DIAGNOSIS — Z79.01 CURRENT USE OF LONG TERM ANTICOAGULATION: ICD-10-CM

## 2025-02-24 DIAGNOSIS — I63.412 CEREBROVASCULAR ACCIDENT (CVA) DUE TO EMBOLISM OF LEFT MIDDLE CEREBRAL ARTERY: Primary | ICD-10-CM

## 2025-02-24 DIAGNOSIS — R29.898 WEAKNESS OF RIGHT LEG: ICD-10-CM

## 2025-02-24 DIAGNOSIS — I42.2 ANEURYSM OF APEX OF HEART DUE TO APICAL HYPERTROPHIC CARDIOMYOPATHY: ICD-10-CM

## 2025-02-24 DIAGNOSIS — Z78.9 ALTERATION IN INSTRUMENTAL ACTIVITIES OF DAILY LIVING (IADL): ICD-10-CM

## 2025-02-24 PROCEDURE — 99214 OFFICE O/P EST MOD 30 MIN: CPT | Mod: PBBFAC | Performed by: STUDENT IN AN ORGANIZED HEALTH CARE EDUCATION/TRAINING PROGRAM

## 2025-02-24 PROCEDURE — 99999 PR PBB SHADOW E&M-EST. PATIENT-LVL IV: CPT | Mod: PBBFAC,,, | Performed by: STUDENT IN AN ORGANIZED HEALTH CARE EDUCATION/TRAINING PROGRAM

## 2025-02-24 NOTE — PROGRESS NOTES
"[facility]  Follow up Visit     Patient Name: Peter Munoz  MRN: [id number]    CC:Embolic L MCA infarct   Interval history: 2/24/2025  Patient returns for follow up. Returns on his own.   Feels that he is not "walking as well", has had a few falls - states that they were mechanical, denies any dizzy spells or near-syncope   Also feels that he is limping a little more than previously.   Balance is "off"   - Seems that he is unable to balance on one foot when he is getting dressed  Admits that he probably should have done therapy earlier, but he refers to himself as "stubborn".   Denies any new episodes of weakness or numbness.   States that he is compliant with his medication regimen.   States that he is doing well from his malignancy history perspective and states that his PSA has been "way down".       HPI 5/8/2023: Peter Munoz is a 69 y.o. R-handed male w/ PMH significant for HTN, HLD, prostate CA (2022), Apical Aneurysm (2023), HOCM presenting as referral status-post hospital admission on 3/26/2023.   Presented w/ RSW and aphasia.  As per admission notes - On arrival to the ED the patient is awake.  He has global aphasia, right facial droop, left gaze preference, left hemianopsia.  VAN+.  NIHSS 24.  He was taken to CT for a CTA Stroke MP.  Imaging reviewed by Dr. Sarkar.  CTH negative, CTA positive for L M1 occlusion.  Discussed the findings with the patient and his wife.  Explained the risks and benefits of thrombolytic therapy with them.  The patient's wife agreed to thrombolytic therapy.  Assessment post thrombolytic therapy mildly improved.  The patient's gaze preference resolved and he had some vocalizations though incomprehensible.  He remained w/RSW, facial droop, and expressive/receptive aphasia.  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 Given echo findings, recommend cardiology consult to see if patient will need further inpatient w/u and starting patient on DOAC.     Discharged to  Home w/ NIHSS of 0, mrS of 1.  Has been walking everyday.    - But feels "old" all of a sudden    - Had a bad day a few days ago when he felt off balance   - Feels like he is at around 90-95%   - Memory is great    - Feels that he is less vocal and his voice is not as loud     - Feels like he has no word finding difficulties       Diagnostic Results:  MOCA 3/28/2023: 23/30, points lost for memory and delayed recall     Brain Imaging   MRI Brain WO 3/26/2023    Findings consistent with acute left MCA territory infarct involving the left basal ganglia and left frontal insular cortex, as above.  No evidence of hemorrhagic conversion.     Vessel Imaging   IR angio 3/26/2023  Left M1 occlusion. Aspiration thrombectomy performed with restoration of TICI 3 flow after 2 passes. Ken Balloon Guide, 6 Fr Gail and 4 Max aspiration catheters were used.      CTA Stroke MP 3/26/2023  Acute left MCA stroke with left M1 occlusion.  Neurointerventional Radiology is aware of this finding and is proceeding to take the patient for thrombectomy.     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 atypical takotsubo cardiomyopathy, suspect former.  Mild left atrial enlargement.  The left ventricle is normal in size with low normal systolic function.  The estimated ejection fraction is 50%.  Indeterminate left ventricular diastolic function.  There are segmental left ventricular wall motion abnormalities.  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  Intermediate central venous pressure (8 mmHg).  The estimated PA systolic pressure is 17 mmHg.  Mild tricuspid regurgitation.    Relevant Lab work   Recent Labs   Lab 03/26/23  0143 " 23  1005   Hemoglobin A1C 5.2  --    LDL Cholesterol Invalid, Trig>400.0 61.6 L   HDL 26 L 31 L   Triglycerides 561 H 97   Cholesterol 174 112 L         NIH Stroke Scale:  Interval: baseline (upon arrival/admit)  Level of Consciousness: 0 - alert  LOC Questions: 0 - answers both correctly  LOC Commands: 0 - performs both correctly  Best Gaze: 0 - normal  Visual: 0 - no visual loss  Facial Palsy: 0 - normal  Motor Left Arm: 0 - no drift  Motor Right Arm: 0 - no drift  Motor Left Le - no drift  Motor Right Le - no drift  Limb Ataxia: 0 - absent  Sensory: 0 - normal  Best Language: 0 - no aphasia  Dysarthria: 0 - normal articulation  Extinction and Inattention: 0 - no neglect  NIH Stroke Scale Total: 0         Review of Systems:  General: No fevers, chills  Eyes: No changes in vision  ENT: No changes in hearing  Respiratory: No SOB  CV: No chest pain, palpitations  GI: No diarrhea, blood in stool  Urinary: No dysuria, hematuria  Skin: No rashes  Neurological: No weakness, confusion  Psychiatric: No auditory nor visual hallucinations      Past Medical History  Past Medical History:   Diagnosis Date    Cancer     prostate    HLD (hyperlipidemia)     Hypertension     Sleep apnea        Medications    Current Outpatient Medications:     abiraterone (ZYTIGA) 500 mg Tab, Take 1,000 mg by mouth., Disp: , Rfl:     apixaban (ELIQUIS) 5 mg Tab, Take 1 tablet (5 mg total) by mouth 2 (two) times daily., Disp: 60 tablet, Rfl: 11    ascorbic acid, vitamin C, (VITAMIN C) 1000 MG tablet, 1 tablet., Disp: , Rfl:     atorvastatin (LIPITOR) 20 MG tablet, Take 1 tablet (20 mg total) by mouth every evening., Disp: 90 tablet, Rfl: 3    buPROPion (WELLBUTRIN SR) 150 MG TBSR 12 hr tablet, Take 1 tablet by mouth twice a day, Disp: 180 tablet, Rfl: 3    buPROPion (WELLBUTRIN SR) 150 MG TBSR 12 hr tablet, Take 2 tablets (300 mg total) by mouth every morning AND 2 tablets (300 mg total) every evening., Disp: 320 tablet, Rfl: 5     buPROPion (WELLBUTRIN XL) 150 MG TB24 tablet, Take 150 mg by mouth once daily., Disp: , Rfl:     carvediloL (COREG) 25 MG tablet, Take 1 tablet (25 mg total) by mouth 2 (two) times daily., Disp: 180 tablet, Rfl: 3    cetirizine (ZYRTEC) 10 mg Cap, 1 tablet., Disp: , Rfl:     FLUoxetine 10 MG capsule, Take by mouth., Disp: , Rfl:     hydroCHLOROthiazide (HYDRODIURIL) 25 MG tablet, Take 1 tablet (25 mg total) by mouth once daily., Disp: 90 tablet, Rfl: 5    loratadine (CLARITIN) 10 mg tablet, 1 tablet Orally Once a day for 30 day(s), Disp: , Rfl:     prednisoLONE acetate (PRED FORTE) 1 % DrpS, Place 1 drop into the right eye 3 (three) times daily for 10 days, Disp: 5 mL, Rfl: 5    predniSONE (DELTASONE) 5 MG tablet, Take 5 mg by mouth 2 (two) times daily., Disp: , Rfl:     RSVPreF3 antigen-AS01E, PF, (AREXVY, PF,) 120 mcg/0.5 mL SusR vaccine, Inject into the muscle., Disp: 1 each, Rfl: 0    RSVPreF3 antigen-AS01E, PF, (AREXVY, PF,) 120 mcg/0.5 mL SusR vaccine, Inject into the muscle., Disp: 1 each, Rfl: 0    telmisartan (MICARDIS) 80 MG Tab, TAKE ONE TABLET BY MOUTH EVERY EVENING, Disp: 90 tablet, Rfl: 3    turmeric 400 mg Cap, as directed, Disp: , Rfl:     zinc gluconate 50 mg tablet, 1 tablet., Disp: , Rfl:   Any other notable medications as documented in HPI    Allergies  Review of patient's allergies indicates:  No Known Allergies    Social History  Social History     Socioeconomic History    Marital status:    Tobacco Use    Smoking status: Never   Substance and Sexual Activity    Alcohol use: Yes     Alcohol/week: 18.0 standard drinks of alcohol     Types: 18 Drinks containing 0.5 oz of alcohol per week    Drug use: Never     Social Drivers of Health     Financial Resource Strain: Low Risk  (9/24/2024)    Overall Financial Resource Strain (CARDIA)     Difficulty of Paying Living Expenses: Not very hard   Food Insecurity: No Food Insecurity (9/24/2024)    Hunger Vital Sign     Worried About Running Out of  "Food in the Last Year: Never true     Ran Out of Food in the Last Year: Never true   Transportation Needs: No Transportation Needs (3/27/2023)    PRAPARE - Transportation     Lack of Transportation (Medical): No     Lack of Transportation (Non-Medical): No   Physical Activity: Sufficiently Active (9/24/2024)    Exercise Vital Sign     Days of Exercise per Week: 4 days     Minutes of Exercise per Session: 50 min   Stress: Stress Concern Present (9/24/2024)    [facility] - Occupational Stress Questionnaire     Feeling of Stress : To some extent   Housing Stability: Unknown (3/27/2023)    Housing Stability Vital Sign     Unable to Pay for Housing in the Last Year: No     Unstable Housing in the Last Year: No     Any other notable Social History as documented in HPI.    Family History  Family History   Problem Relation Name Age of Onset    Hypertension Brother       Any other notable FMH as documented in HPI.    Physical Exam  /84 (BP Location: Right arm, Patient Position: Sitting)   Pulse 73   Ht 5' 7" (1.702 m)   Wt 88.7 kg (195 lb 8.8 oz)   BMI 30.63 kg/m²     General: Well-developed, well-groomed. No apparent distress  HENT: Normocephalic, atraumatic.    Cardiovascular: Regular rate and rhythm  Chest: No audible wheezes, stridor, ronchi appreciated.  Musculoskeletal: No peripheral edema    Neurologic Exam: The patient is awake, alert and oriented to person, place, time and situation. Attentive, vigilant during exam. Language is fluent. Naming & repetition intact, +2-step commands.  Fund of knowledge is appropriate. Well organized thoughts.      Cranial nerves:   CN II: Visual fields are full to confrontation. Pupils are 4 mm and briskly reactive to light.  CN III, IV, VI: EOMI, no nystagmus, no ptosis  CN V: Facial sensation is intact in all 3 divisions bilaterally.  CN VII: Face is symmetric with normal eye closure and smile.  CN VIII: Hearing is normal bilaterally  CN IX, X: " Palate elevates symmetrically. Phonation is normal.  CN XI: Head turning and shoulder shrug are intact  CN XII: Tongue is midline with normal movements and no atrophy.    Motor examination of all extremities demonstrates normal bulk and tone in all four limbs. There are no atrophy or fasciculations.      Left Right   Left Right   Deltoid 5/5 5/5  Hip Flexion 5/5 4/5   Biceps 5/5 5/5  Hip Extension 5/5 4+/5   Triceps 5/5 5/5  Knee Flexion 5/5 4/5   Wrist Ext 5/5 4+/5  Knee Extension 5/5 4+/5   Finger Abd 5/5 4/5  Ankle dorsiflex 5/5 4/5       Ankle plantar flex 5/5 4+/5     R pronator drift     Sensory examination is normal light touch in BUE and BLE.      Deep tendon reflexes No clonus. Negative Barr's    Gait: Mildly antalgic gait w/ RLE everted.     Coordination: No dysmetria with finger-to-nose. Rapid alternating movements and fine finger movements are intact.     Assessment and Plan  Histroy of L MCA territory CE infarct. Persistent mild RLE weakness w/ distal RUE weakness.  Patient is interested in trying some therapy at this time to improve his functional mobility.    - Continue Eliquis 5 mg BID)  - Continue Atorvastatin 20 mg daily  - PT/OT outpatient ordered per request   - Follow up w/ Cardiology as needed    Diagnosis/Etiology: L MCA infarct, CE  Stroke Risk Factors: HTN, HLD, prostate CA (2022), Apical Aneurysm (2023), HOCM      Recommendations:   Antiplatelet/Anticoagulation: Eliquis 5 mg BID  Lipid Management: Atorvastatin 20 mg QHS (long-term goal LDL < 70).   - Monitoring for liver dysfunction and myopathy is suggested for statins. To be addressed by PCP  Diabetes: Target hemoglobin A1c <7%, measured 2X/year or quarterly if not meeting goals  Hypertension: Long term goal is normotension w/ target BP of less than 130/80 mmHg  Sleep: Denies any symptoms of BARRETT. Consider Sleep Medicine follow up in the future if suspicion for BARRETT occurs.   Smoking: Goal is smoking cessation and encouragement not to  start smoking in the future.   Diet: Discussed Mediterranean Diet recommendations (Adopted from Emmanuel et al, [facility], 2018.)  - Eat primarily plant-based foods, such as fruits and vegetables, whole grains, legumes (beans) and nuts  - Limit refined carbohydrates (white pasta, bread, rice).  - Replace butter with healthy fats such as olive oil.  - Use herbs and spices instead of salt to flavor foods.  - Limit red meat and processed meats to no more than a few times a month.  - Avoid sugary sodas, bakery goods, and sweets.  - Eat fish and poultry at least twice a week.  - Get plenty of exercise (150 minutes per week).    RTC in PRN    32 minutes were spent on the date of this patient encounter, which includes: preparing to see the patient, reviewing previous history, obtaining new patient history, performing the physical exam, counseling and educating the patient and/or family/caregiver, ordering necessary medications or tests or referrals, documenting in the electronic medical record, coordinating care.       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

## 2025-02-26 ENCOUNTER — CLINICAL SUPPORT (OUTPATIENT)
Dept: CARDIOLOGY | Facility: HOSPITAL | Age: 70
End: 2025-02-26
Payer: MEDICARE

## 2025-02-26 ENCOUNTER — CLINICAL SUPPORT (OUTPATIENT)
Dept: CARDIOLOGY | Facility: HOSPITAL | Age: 70
End: 2025-02-26
Attending: INTERNAL MEDICINE
Payer: MEDICARE

## 2025-02-26 DIAGNOSIS — Z95.810 PRESENCE OF AUTOMATIC (IMPLANTABLE) CARDIAC DEFIBRILLATOR: ICD-10-CM

## 2025-02-26 DIAGNOSIS — I42.2 OTHER HYPERTROPHIC CARDIOMYOPATHY: ICD-10-CM

## 2025-02-26 PROCEDURE — 93295 DEV INTERROG REMOTE 1/2/MLT: CPT | Mod: ,,, | Performed by: INTERNAL MEDICINE

## 2025-02-27 ENCOUNTER — CLINICAL SUPPORT (OUTPATIENT)
Dept: REHABILITATION | Facility: HOSPITAL | Age: 70
End: 2025-02-27
Payer: MEDICARE

## 2025-02-27 DIAGNOSIS — M62.81 MUSCLE WEAKNESS OF RIGHT UPPER EXTREMITY: ICD-10-CM

## 2025-02-27 DIAGNOSIS — I63.412 CEREBROVASCULAR ACCIDENT (CVA) DUE TO EMBOLISM OF LEFT MIDDLE CEREBRAL ARTERY: ICD-10-CM

## 2025-02-27 DIAGNOSIS — R27.9 UNSPECIFIED LACK OF COORDINATION: Primary | ICD-10-CM

## 2025-02-27 DIAGNOSIS — Z78.9 ALTERATION IN INSTRUMENTAL ACTIVITIES OF DAILY LIVING (IADL): ICD-10-CM

## 2025-02-27 DIAGNOSIS — Z78.9 ALTERATION IN PERFORMANCE OF ACTIVITIES OF DAILY LIVING: ICD-10-CM

## 2025-02-27 DIAGNOSIS — Z74.09 IMPAIRED FUNCTIONAL MOBILITY, BALANCE, GAIT, AND ENDURANCE: Primary | ICD-10-CM

## 2025-02-27 DIAGNOSIS — Z74.09 IMPAIRED FUNCTIONAL MOBILITY, BALANCE, GAIT, AND ENDURANCE: ICD-10-CM

## 2025-02-27 PROCEDURE — 97161 PT EVAL LOW COMPLEX 20 MIN: CPT | Mod: PO

## 2025-02-27 PROCEDURE — 97166 OT EVAL MOD COMPLEX 45 MIN: CPT | Mod: PO

## 2025-02-27 NOTE — PROGRESS NOTES
Outpatient Rehab    Physical Therapy Evaluation    Patient Name: Peter Munoz  MRN: [id number]  YOB: 1955  Encounter Date: 2/27/2025    Therapy Diagnosis:   Encounter Diagnoses   Name Primary?    Cerebrovascular accident (CVA) due to embolism of left middle cerebral artery     Impaired functional mobility, balance, gait, and endurance Yes     Physician: Racquel Lazcano MD    Physician Orders: Eval and Treat  Medical Diagnosis:   Diagnosis   I63.412 (ICD-10-CM) - Cerebrovascular accident (CVA) due to embolism of left middle cerebral artery       Visit # / Visits Authorized:  01 / 01   Date of Evaluation:  2/27/2025   Insurance Authorization Period: 2/24/2025 to 12/31/2025  Plan of Care Certification:  2/27/2025 to 4/24/2025      Time In: 1100   Time Out: 1145  Total Time: 45   Total Billable Time: 45 minutes     Intake Outcome Measure for FOTO Survey    Therapist reviewed FOTO scores for Peter Munoz on 2/27/2025.   FOTO report - see Media section or FOTO account episode details.     Intake Score: 69%         Subjective   History of Present Illness  Peter is a 69 y.o. male who reports to physical therapy with a chief concern of imbalance with risidual R sided weakness from CVA.                 Dominant Hand: Right  History of Present Condition/Illness: That his balance has progressively worsened. He reports he believes it is related to changes in his vision recently with getting cataract surgeries in addition to weakness on his right side from a stroke in 2023. He reports two falls in the past six months. The patient reports decreased endurance and increased fatigue with minimal activity.  The patient just recently started strength training about two weeks ago.     Activities of Daily Living  Social history was obtained from Patient.    General Prior Level of Function Comments: independent with all functional mobility and ADLs  General Current Level of Function Comments: independent with all  "functional mobility and ADLs  Patient Roles: Caregiver for child  Patient Responsibilities: Driving, Community mobility, Home management, Personal ADL        Pain     Patient reports a current pain level of 0/10. Pain at best is reported as 0/10. Pain at worst is reported as 0/10.             Treatment History  Treatments  Previously Received Treatments: No    Living Arrangements  Living Situation  Housing: Home with care/services  Type of Care/Services Provided at Home: Family care  Living Arrangements: Spouse/significant other    Equipment/Treatments  Other Adaptive Equipment Comment: none    2 story home, bedroom on the first floor. No steps to enter home if entering the home from the back       Employment  Patient does not report that: Does the patient's condition impact their ability to work?  Employment Status: Retired          HPI from neurology    CC:Embolic L MCA infarct   Interval history: 2/24/2025  Patient returns for follow up.   Feels that he is not "walking as well", has had a few falls - states that they were mechanical   Also feels that he is limping a little more  Balance is "off"              - Seems that he is unable to balance on foot  HPI 5/8/2023: Peter Munoz is a 69 y.o. R-handed male w/ PMH significant for HTN, HLD, prostate CA (2022), Apical Aneurysm (2023), HOCM presenting as referral status-post hospital admission on 3/26/2023.   Presented w/ RSW and aphasia.  As per admission notes - On arrival to the ED the patient is awake.  He has global aphasia, right facial droop, left gaze preference, left hemianopsia.  VAN+.  NIHSS 24.  He was taken to CT for a CTA Stroke MP.  Imaging reviewed by Dr. Sarkar.  CTH negative, CTA positive for L M1 occlusion.  Discussed the findings with the patient and his wife.  Explained the risks and benefits of thrombolytic therapy with them.  The patient's wife agreed to thrombolytic therapy.  Assessment post thrombolytic therapy mildly improved.  The " "patient's gaze preference resolved and he had some vocalizations though incomprehensible.  He remained w/RSW, facial droop, and expressive/receptive aphasia.  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 Given echo findings, recommend cardiology consult to see if patient will need further inpatient w/u and starting patient on DOAC  Discharged to  Home w/ NIHSS of 0, mrS of 1.  Has been walking everyday.               - But feels "old" all of a sudden               - Had a bad day a few days ago when he felt off balance              - Feels like he is at around 90-95%              - Memory is great               - Feels that he is less vocal and his voice is not as loud                           - Feels like he has no word finding difficulties     Past Medical History/Physical Systems Review:   Peter Munoz  has a past medical history of Cancer, HLD (hyperlipidemia), Hypertension, and Sleep apnea.    Peter Munoz  has a past surgical history that includes Cerebral angiogram (N/A, 03/26/2023); Prostatectomy; and insertion, icd, single chamber (Left, 11/30/2023).    Peter has a current medication list which includes the following prescription(s): abiraterone, apixaban, ascorbic acid (vitamin c), atorvastatin, bupropion, bupropion, bupropion, carvedilol, zyrtec, fluoxetine, hydrochlorothiazide, loratadine, prednisolone acetate, prednisone, arexvy (pf), arexvy (pf), telmisartan, turmeric, and zinc gluconate.    Review of patient's allergies indicates:  No Known Allergies     Objective            RANGE OF MOTION--LOWER EXTREMITIES  (R) LE Hip: normal   Knee: normal   Ankle: normal    (L) LE: Hip: normal   Knee: normal   Ankle: normal    Strength: manual muscle test grades below   Lower Extremity Strength  Right LE  Left LE    Hip " Flexion: 4-/5 Hip Flexion: 4-/5   Hip Extension:  4/5 Hip Extension: 4+/5   Hip Abduction: 5/5 Hip Abduction: 5/5   Hip Adduction: 4/5 Hip Adduction 4+/5   Knee Extension: 5/5 Knee Extension: 5/5   Knee Flexion: 4/5 Knee Flexion: 4+/5   Ankle Dorsiflexion: 5/5 Ankle Dorsiflexion: 5/5   Ankle Plantarflexion: 5/5 Ankle Plantarflexion: 5/5     Abdominal Strength: 3/5    Flexibility: WNL     Evaluation   Single Limb Stance R LE 1 second  (<10 sec = HIGH FALL RISK)   Single Limb Stance L LE 1 second  (<10 sec = HIGH FALL RISK)      Evaluation   30 second Chair Rise  (adults > 59 y/o) 10 completed with no arms   5 times sit-stand  (adults 18-63 y/o) 15 seconds with no UE support   >12 sec= fall risk for general elderly  >16 sec= fall risk for Parkinson's disease  >10 sec= balance/vestibular dysfunction (<59 y/o)  >14.2 sec= balance/vestibular dysfunction (>59 y/o)  >12 sec= fall risk for CVA     Postural control:  MCTSIB:  1. Eyes Open/feet together/Firm: 30 seconds  2. Eyes Closed/feet together/Firm: 30 seconds  3. Eyes Open/feet together/Foam: 30 seconds  4. Eyes Closed/feet together/Foam: 30 seconds moderate sway     Gait Assessment:   - AD used: none   - Assistance: independent   - Distance: ambulatory throughout session     GAIT DEVIATIONS:  Peter displays the following deviations with ambulation: decreased BLE foot clearance, slight external rotation of the RLE, decreased step length, aleshia and velocity of limb movement     Impairments contributing to deviations: impaired motor control, impaired endurance, impaired BLE strength, impaired static/ dynamic balance     Endurance Deficit: moderate       Evaluation   6 MWT  1250 feet    Self Selected Walking Speed 0.86 m/sec (6m/7s)   Fast Walking Speed 1.0 m/sec (6m/6s)       Functional Gait Assessment:   1. Gait on level surface =  3  2. Change in Gait Speed = 2  3. Gait with horizontal head turns  = 1  4. Gait with vertical head turns = 2  5. Gait with pivot turns =  2  6. Step over obstacle = 3  7. Gait with Narrow JOHN = 0  8. Gait with eyes closed = 1  9. Ambulating Backwards = 2  10. Steps = 2     Score 18/30   Score:   <22/30 fall risk   <20/30 fall risk in older adults   <18/30 fall risk in Parkinsons     Treatment:  Treatment not performed due to evaluation lasting duration of session     Assessment & Plan   Assessment  Peter presents with a condition of Low complexity.   Presentation of Symptoms: Stable  Will Comorbidities Impact Care: Yes       Functional Limitations: Activity tolerance, Ambulating on uneven surfaces, Bed mobility, Carrying objects, Community integration, Completing self-care activities, Decreased ambulation distance/endurance, Completing work/school activities, Driving, Functional cognition, Functional mobility, Gait limitations, Getting off the floor, Increased risk of fall, Maintaining balance, Participating in leisure activities, Painful locomotion/ambulation, Pain with ADLs/IADLs, Pain when reaching, Manipulating objects, Reaching, Sitting tolerance, Standing tolerance, Squatting, Performing household chores  Impairments: Abnormal gait, Abnormal or restricted range of motion, Impaired physical strength, Lack of appropriate home exercise program, Impaired balance  Personal Factors Affecting Prognosis: Motivation    Patient Goal for Therapy (PT): improve balance and overall mobility  Prognosis: Good  Assessment Details:  Based on the patient's MMT scores, the patient presents with slightly impaired BLE strength with more significant strength deficits noted on the RLE. Hip flexion most limited. Based on the patient's SSWS, the patient falls into the limited community ambulator category. The patient's score on the FGA places the patient in the elevated fall risk category. The patient has the most difficulty with ambulation with eyes closed and tandem ambulation.  The patient's 5 time sit to stand score also places the patient in the elevated fall risk  category and indicates impaired LE strength and endurance. Decreased eccentric control when sitting down noted. The patient passed all four conditions of the MCTSIB but did have increased difficulty with vision eliminated on complaint surface . The patient's SLS time places the patient in the elevated fall risk category. The patient will benefit from skilled physical therapy intervention to address the deficits listed above. Plan to focus on endurance, balance and LE strength.      Plan  From a physical therapy perspective, the patient would benefit from: Skilled Rehab Services    Planned therapy interventions include: Therapeutic exercise, Therapeutic activities, Neuromuscular re-education, Manual therapy, Canalith repositioning, ADLs/IADLs, Community/work reintegration, Gait training, Orthotic management and training, Prosthetic management and training, Wheelchair management, and Cognitive functional training.            Visit Frequency: 2 times Per Week for 8 Weeks.       This plan was discussed with Patient.   Discussion participants: Agreed Upon Plan of Care  Plan details: Dynamic balance, endurance and strength training           Patient's spiritual, cultural, and educational needs considered and patient agreeable to plan of care and goals.     Education  Education was done with Patient. The patient's learning style includes Demonstration and Listening. The patient Verbalizes understanding and Demonstrates understanding.         POC, purpose of PT, discharge planning       Goals:   Active       Short term goals        Patient to be independent with established home exercise program        Start:  02/27/25    Expected End:  03/27/25            Patient to improve hip flexion MMT by 1/3        Start:  02/27/25    Expected End:  03/27/25            Patient to improve right  knee flexion MMT by 1/3       Start:  02/27/25    Expected End:  03/27/25            Patient to improve FGA score to 21/30       Start:   02/27/25    Expected End:  03/27/25               long term goals        Patient to improve right hip flexion strength MMT score by 2/3        Start:  02/27/25    Expected End:  04/24/25            Patient to improve FGA score to 25/30       Start:  02/27/25    Expected End:  04/24/25            Patient to improve 6 MWT distance to 1450 feet        Start:  02/27/25    Expected End:  04/24/25            patient to improve BLE SLS time to 5 seconds        Start:  02/27/25    Expected End:  04/24/25                Josette Murphy, PT

## 2025-03-04 PROBLEM — Z78.9 ALTERATION IN PERFORMANCE OF ACTIVITIES OF DAILY LIVING: Status: ACTIVE | Noted: 2025-03-04

## 2025-03-04 PROBLEM — Z78.9 ALTERATION IN INSTRUMENTAL ACTIVITIES OF DAILY LIVING (IADL): Status: ACTIVE | Noted: 2025-03-04

## 2025-03-04 PROBLEM — R27.9 UNSPECIFIED LACK OF COORDINATION: Status: ACTIVE | Noted: 2025-03-04

## 2025-03-04 PROBLEM — M62.81 MUSCLE WEAKNESS OF RIGHT UPPER EXTREMITY: Status: ACTIVE | Noted: 2025-03-04

## 2025-03-05 NOTE — PROGRESS NOTES
Outpatient Rehab    Occupational Therapy Evaluation (only)    Patient Name: Peter Munoz  MRN: [id number]  YOB: 1955  Encounter Date: 2/27/2025    Therapy Diagnosis:   Encounter Diagnoses   Name Primary?    Cerebrovascular accident (CVA) due to embolism of left middle cerebral artery     Impaired functional mobility, balance, gait, and endurance     Alteration in performance of activities of daily living     Alteration in instrumental activities of daily living (IADL)     Unspecified lack of coordination Yes    Muscle weakness of right upper extremity      Physician: Racquel Lazcano MD    Physician Orders: Eval and Treat  Medical Diagnosis: I63.412 (ICD-10-CM) - Cerebrovascular accident (CVA) due to embolism of left middle cerebral artery    Visit # / Visits Authorized:  1 / 1 (eval only)   Date of Evaluation:  2/27/2025   Insurance Authorization Period: 2/24/2026   Plan of Care Certification:  2/27/2025 to 5/13/2025      Time In: 1308   Time Out: 1350  Total Time: 42   Total Billable Time: 42    Intake Outcome Measure for FOTO Survey    Unable to complete this date d/t time constraint; plan to assess next visit    I       Subjective   History of Present Illness  Peter is a 69 y.o. male      The patient reports a medical diagnosis of I63.412 (ICD-10-CM) - Cerebrovascular accident (CVA) due to embolism of left middle cerebral artery.            Dominant Hand: Right  History of Present Condition/Illness: Pt. With hx of CVA due to embolism of left middle cerebral artery 3/26/2023 with resulting R side weakness and aphasia. Pt. Reports no significant deficits/impairments following acute phase of CVA, however reports recent noted changes in R side strength, mobility/balance, coordination, vision, and cognition.    Activities of Daily Living  Social history was obtained from Patient.          Patient Roles: Caregiver for child  Patient Responsibilities: Community mobility, Driving, Financial management,  Health management, Home management, Personal ADL, Shopping, Laundry, Meal prep, Yard work, Other (Comment)  Other Patient Roles and Responsibilities: continuing to do part time work as     Previously independent with activities of daily living? Yes     Currently independent with activities of daily living? Yes          Previously independent with instrumental activities of daily living? Yes     Currently independent with instrumental activities of daily living? No  Activities currently needing assistance include: Home establishment and management and Meal prep.            Pain  No Pain Reported: Yes                 Living Arrangements  Living Situation  Housing: Home with care/services  Living Arrangements: Spouse/significant other    Home Setup  Number of Levels in Home: Two level  Primary Bedroom: 1st floor    2 story home, no steps to enter if going thru the back door, threshold to step over; basement         Past Medical History/Physical Systems Review:   Peter Munoz  has a past medical history of Cancer, HLD (hyperlipidemia), Hypertension, and Sleep apnea.    Peter Munoz  has a past surgical history that includes Cerebral angiogram (N/A, 03/26/2023); Prostatectomy; and insertion, icd, single chamber (Left, 11/30/2023).    Peter has a current medication list which includes the following prescription(s): abiraterone, apixaban, ascorbic acid (vitamin c), atorvastatin, bupropion, bupropion, bupropion, carvedilol, zyrtec, fluoxetine, hydrochlorothiazide, loratadine, prednisolone acetate, prednisone, arexvy (pf), arexvy (pf), telmisartan, turmeric, and zinc gluconate.    Review of patient's allergies indicates:  No Known Allergies     Objective     Cognition  Reports noted changes with problem solving, following directions, short term memory, and comprehension       Vision  Reports decreased vision lately, things aren't as clear and feels like vision changes may be contributing to recent  issues with mobility and coordination; recently had R cataract surgery, plan to undergo L cataract surgery in few weeks; also undergoing R retina surgery soon  OT plan to further assess vision if clarity/reported issues do not resolve after scheduled eye surgeries       Upper Extremity Sensation  Denies changes in sensation      Upper Extremity Range of Motion   R shoulder extension, IR, supination, & wrist extension AROM >/= 3/4; remainder of RUE AROM WFL  LUE AROM WFL       Upper Extremity Strength Details  4/5 MMT RUE IR, ER, sh ext, sh flexion, supination, wrist ext; remainder of RUE MMT 5/5  LUE MMT 5/5      Right  Strength  Right Hand Dynamometer Position: 2  Elbow Position Forearm Position Trial 1 (lbs) Trial 2  (lbs) Trial 3  (lbs) Average  (lbs) Pain   Flexed Neutral 83               Left  Strength  Left Hand Dynamometer Position: 2  Elbow Position Forearm Position Trial 1 (lbs) Trial 2 (lbs) Trial 3 (lbs) Average (lbs) Pain   Flexed Neutral 73               Coordination     Increased stiffness and bradykinetic mvmts observed with RUE and with ambulating/transfers     Right Grooved Peg Test: 203  Left Grooved Peg Test: 164  Grooved Peg Board: L: 2:44 mins ; R: 3:23 mins      Activities of Daily Living Details  Ind/Mod Ind for all ADLs and IADLs, wife doesn't want him to do cooking or complex home maintenance; endorses increased difficulty with toweling off and maintaining balance when toweling off, wiping bottom with RUE, cutting with knife, reaching behind to put R arm in jacket/wrap around shirt; has to be careful in the shower and when stepping over shower ledge (has grab bar and holds on); trouble with stepping over ledge into house; has to be careful when picking up grandchildren; decreased handwriting legibility for work, health and financial mgmt         Assessment & Plan     Assessment    Peter presents with a condition of Moderate complexity.         ADL Limitations : Bathing/showering,  Dressing, Feeding, Functional mobility, Personal device care, Personal hygiene and grooming, Sexual activity, Toileting and toilet hygiene  IADL Limitations: Care of others, Community mobility, Financial management, Grocery/shopping, Health management and maintenance, Home establishment and management, Meal preparation and cleanup, Safety and emergency maintenance  Work Limitations: Job performance  Functional Limitations: Bed mobility, Carrying objects, Fine motor coordination, Functional cognition, Functional mobility, Getting off the floor, Gross motor coordination, Increased risk of fall, Maintaining balance, Manipulating objects, Proprioception, Range of motion, Reaching, Transfers, Visual impairments       Patient Goal for Therapy (OT): Improve balance, improve function/use of RUE, reduce RUE stiffness/tightness, maintain role/skills necessary to care for grandchildren  Prognosis: Good    Assessment Details:  Mr. Munoz is a 69 y.o. male who presents for initial evaluation with occupational therapy at [facility]. Pt. Suffered from L MCA CVA on 3/26/2023. Pt. Presented with RSW & aphasia immediately following CVA however reports recovering quickly and well with no lingering deficits after acute phase; pt. Reports he did not receive OT, PT or ST following CVA as it was seemingly not needed nor encouraged. Pt. Lives in 2 story home with his wife, is currently Ind/Mod Ind for IADLs, driving, working part time (Real estate), and caring frequently for his grandchildren (picks up 6 & 2.5 y.o. from school; also 2 young granddaughters). Pt. Reports recent decline in fxnl mobility, fxnl use of RUE & vision changes. Pt. Recently underwent R cataract surgery & plan to undergo L cataract surgery in few weeks; also scheduled to have R retina surgery soon; believes vision may have slightly improved since R cataract surgery and hoping will completely improve with L cataract surgery & R retina  "surgery. Pt. Reports decreased visual clarity & also believes these visual changes may be contributing to some of his mobility deficits. Pt. Reports suffering from x2 falls within the past year, one when missing bottom step descending basement stairs & other tripping over parking bumper, no resulting injuries following falls. Pt. Believes his balance is "off" and he's not walking as well recently; he reports increased RLE ER when he's ambulating & wife has to constantly bring RLE positioning to his attention. He reports being extra cautious in the shower, has to hold onto railing when stepping in/out of shower, feels like he's going to lose balance when toweling off, difficulty stepping over home doorway threshold, and is extra cautious when lifting his young grandchildren. Mr. Munoz endorses global mm stiffness/tightness, especially thru the R side. He has a hx of R shoulder pain and R rotator cuff tendonitis and reports he "feels like he can't lift his right shoulder sometimes"; no identifying factors/contributors know when experiencing episodes of limited RUE proximal mobility, pt. reports "it just comes and goes." Pt. also believes he has slowed down globally. He reports decreased coordination skills and difficulty reaching to place RUE thru shirt sleeve/jacket when behind back; unable to wipe bottom with RUE; trouble/errors typing on his phone and typing on computer with R hand; decreased coordination cutting with a knife; difficulty/slower to open bottles/containers; and handwriting changes, reporting decreased handwriting size and legibility when progressing down and to R of paper. Pt. reports his wife is apprehensive to let him participate in higher level IADLs, such as home maintenance with tool use, climbing, etc.. and complex meal prep. Mr. Munoz also endorses cognitive changes lately, stating difficulty with short term memory, delayed recall, comprehension, & following directions. OT educated pt. " on purpose/role of ST and ability to request orders for eval/tx to address cognition if he believes these recent cognitive changes warrant the need for specialty services to address. OT also educated pt. on higher level cognition/executive functioning also within OT scope of practice to address. Pt. not wishing to be evaluated by ST at this time but would prefer OT incorporate higher level cognition into tx interventions as he continues to work part time, care for his grandchildren, participate within the community, etc.. Objective measures and clinical observation this date revealed deficits related to RUE AROM, strength, FM coordination, in hand manipulation, RUE tissue length imbalances, decreased joint arthrokinematics, postural control, dynamic standing balance, proprioceptive/kinesthetic mvmt awareness, and overall bradykinetic movements noted thru trunk and RUE. OT discussed findings with Mr. Munoz this date and discussed OT tx focus and established goals specific to objective findings and pt's reported deficit areas. Pt. expressed verbal understanding to all education/recommendations provided, and in agreement with OT plan and goals. Skilled, neuro specific, OP based OT services are warranted as medically necessary to address above mentioned deficits in order to reduce risk for further debility, increase safety, independence and efficiency with all daily occupations.    Plan  From an occupational therapy perspective, the patient would benefit from: Skilled Rehab Services  Planned therapy interventions include: Therapeutic exercise, Therapeutic activities, Manual therapy, Neuromuscular re-education, ADLs/IADLs, and Cognitive functional training.    Visit Frequency: 2 times Per Week for 10 Weeks.    This plan was discussed with Patient.   Discussion participants: Agreed Upon Plan of Care  Plan details: OT plan to address RUE sensorimotor, proprioceptive/kinesthetic movement awareness, global joint  stiffness/mm tightness, balance, fxnl ax tolerance, and executive functioning deficits at frequency of 2x/week to address above mentioned deficits necessary to reduce pt's risk of falls, further debility, and secondary musculoskeletal damage/impairments, increase safety, independence, and efficiency with ADLs, IADLs, work, and  roles/responsibilities, and improve overall QOL.         Patient's spiritual, cultural, and educational needs considered and patient agreeable to plan of care and goals.     Education  Education was done with Patient. The patient's learning style includes Listening. The patient Verbalizes understanding.         2/27/25: OT role/purpose; potential to refer to ST/ST role addressing cognition; presenting proprioceptive/kinesthetic mvmt awareness deficits characteristic of areas affected by CVA; OT plan/tx focus, therapy attendance policy, carry over/HEP compliance expectations/benefits         Goals:   Active       Long Term Goals       Pt. will increase AROM in all deficits areas of RUE.       Start:  02/27/25 2/27/25: >/= 3/4 R shoulder extension, IR, supination, & wrist extension         Pt. will increase RUE MMT to 5/5 in all deficit areas for improved fxnl use of dominant RUE throughout daily occupational performance.        Start:  02/27/25 2/27/25: 4/5 RUE IR, ER, sh ext, sh flexion, supination, wrist ext         Pt. will be able to wipe bottom following toileting using dominant RUE.       Start:  02/27/25 2/27/25: Not able to wipe with RUE         Pt. will demonstrate improved FM coordination as evidence by completing Grooved Peg Board in </= 2:45 mins.       Start:  02/27/25 2/27/25: 3:23 mins         Pt. will improve handwriting legibility and maintain consistency of legibility to Fair+ for increased independence with legal, financial & medical mgmt.       Start:  02/27/25 2/27/25: Poor, not consistent as writing progresses to R and down page               Long Term Goals (Cont'd)       Pt. will improve R side proprioception to >/= Fair+ for reduced risk of falls and improved fxnl use of dominant R side throughout daily occupational performance.       Start:  02/27/25 2/27/25: Fair-/Fair         Pt. will perform global stretching HEP Ind.       Start:  02/27/25            Pt. will perform RUE strengthening HEP Ind.       Start:  02/27/25               Short Term Goals       Pt. will demonstrate improved FM coordination as evidence by completing Grooved Peg Board in </= 3:00 mins.       Start:  02/27/25 2/27/25: 3:23 mins         Pt. will improve handwriting legibility and maintain consistency of legibility to Fair for increased independence with legal, financial & medical mgmt.       Start:  02/27/25 2/27/25: Poor, not consistent as writing progresses to R and down page         OT will provide training/education on global stretching HEP to promote tissue lengthening necessary for improved postural control, fxnl mobility, & fxnl performance throughout ADLs/IADLs.       Start:  02/27/25            TBA Functional Task Recording Form with accompanying goal(s) to follow as needed.       Start:  02/27/25       Opening containers/bottles, cutting with knife, putting R arm thru shirt/jacket sleeve, wiping bottom with RUE, phone/computer use         OT will provide training/education on RUE proximal strengthening/stability HEP.       Start:  02/27/25                Sukumar Holt OT

## 2025-03-06 ENCOUNTER — CLINICAL SUPPORT (OUTPATIENT)
Dept: REHABILITATION | Facility: HOSPITAL | Age: 70
End: 2025-03-06
Payer: MEDICARE

## 2025-03-06 DIAGNOSIS — M62.81 MUSCLE WEAKNESS OF RIGHT UPPER EXTREMITY: ICD-10-CM

## 2025-03-06 DIAGNOSIS — R27.9 UNSPECIFIED LACK OF COORDINATION: ICD-10-CM

## 2025-03-06 DIAGNOSIS — Z78.9 ALTERATION IN PERFORMANCE OF ACTIVITIES OF DAILY LIVING: Primary | ICD-10-CM

## 2025-03-06 DIAGNOSIS — Z78.9 ALTERATION IN INSTRUMENTAL ACTIVITIES OF DAILY LIVING (IADL): ICD-10-CM

## 2025-03-06 PROCEDURE — 97530 THERAPEUTIC ACTIVITIES: CPT | Mod: PO

## 2025-03-06 PROCEDURE — 97112 NEUROMUSCULAR REEDUCATION: CPT | Mod: PO

## 2025-03-06 NOTE — PROGRESS NOTES
"  Outpatient Rehab    Occupational Therapy Visit    Patient Name: Peter Munoz  MRN: [id number]  YOB: 1955  Encounter Date: 3/6/2025    Therapy Diagnosis:   Encounter Diagnoses   Name Primary?    Alteration in performance of activities of daily living Yes    Alteration in instrumental activities of daily living (IADL)     Unspecified lack of coordination     Muscle weakness of right upper extremity      Physician: Racquel Lazcano MD    Physician Orders: Eval and Treat  Medical Diagnosis:  I63.412 (ICD-10-CM) - Cerebrovascular accident (CVA) due to embolism of left middle cerebral artery     Visit # / Visits Authorized:  1 / 20 (+eval)   Date of Evaluation:  2/27/2025   Insurance Authorization Period: 3/6/2025 - 12/31/2025  Plan of Care Certification:  2/27/2025 to 5/13/2025          Time In: 0935   Time Out: 1015  Total Time: 40   Total Billable Time: 40      FOTO:  Intake Score: 71%         Subjective   "I had the left eye cataract surgery done yesterday. It's fine, I just can't lift anything heavier than 15 lbs." ; "I feel like my cognition isn't good, I'm not trusting myself with spelling." - pt's report following typing speed/accuracy assessment; "With using my phone I'll make alot of errors. I'll hit something (with RUE) accidently and not even realize it.".  Pain reported as 0/10.        Objective   - Typing Speed (3 min duration): 10 wpm, 91% accuracy  - Functional Task Recording Form (initial assessment)          Treatment:  Therapeutic Activity  Therapeutic Activity 1: x2 1 min typing speed trials for adjustment to unfamiliar computer, 8 wpm, 80% accuracy & 17 wpm, 99% accuracy; typing speed/accuracy assessed for 3 min duration trial: 10 wpm, 91%, overall increased rigidity, slow mvmt patterns observed bilaterally; new goals established to reflect improvements in typing speed/accuracy  Therapeutic Activity 2: Functional Task Recording Form completed, pt. reporting cutting food with knife " (2/7), donning clothing over RUE (3/7), using dominant RUE to wipe bottom during toileting (4/7), handwriting (5/7), touch screen phone accuracy/awareness of tapping screen with RUE (3/7), donning pants in standing (4/7); new goals added to reflect increased independence, safety & efficiency with pt. selected tasks not previously addressed thru goals established on initial eval.  Therapeutic Activity 3: FOTO completed, see media for findings    Balance/Neuromuscular Re-Education  Balance/Neuromuscular Re-Education Activity 1: To address proprioceptive/kinesthetic mvmt awareness and promote global tissue lengthening, amplitude based, interlimb coordination, full body incorporation seated exercises promoting thoracic extension, weight shifting, axial rotation, and stepping, x5 reps each exercise/each side, modeling & min v/c for motor planning/sequencing      Assessment & Plan   Assessment: FOTO, Functional Task Recording Form, and typing speed/accuracy completed/assessed this date with new goals added based on objective measures and pt. reported goals; see activites, objective, goals, and media for findings. OT discussed findings and OT goals/tx focus with pt. expressing verbal understanding and in agreement to OT plan/goals. Pt. tolerated full body incorporation, amplitude based seated exercises well this date, denying any pain during/following performing and reporting reduced global stiffness/mm tightness at end of session.  Evaluation/Treatment Tolerance: Patient tolerated treatment well    Patient will continue to benefit from skilled outpatient occupational therapy to address the deficits listed in the problem list box on initial evaluation, provide pt/family education and to maximize pt's level of independence in the home and community environment.     Patient's spiritual, cultural, and educational needs considered and patient agreeable to plan of care and goals.           Plan: OT plan to address RUE  sensorimotor, proprioceptive/kinesthetic movement awareness, global joint stiffness/mm tightness, balance, fxnl ax tolerance, and executive functioning deficits at frequency of 2x/week necessary to reduce pt's risk of falls, further debility, and secondary musculoskeletal damage/impairments, increase safety, independence, and efficiency with ADLs, IADLs, work, and  roles/responsibilities, and improve overall QOL.    Goals:   Active       Long Term Goals       Pt. will increase AROM in all deficits areas of RUE. (Progressing)       Start:  02/27/25 2/27/25: >/= 3/4 R shoulder extension, IR, supination, & wrist extension         Pt. will increase RUE MMT to 5/5 in all deficit areas for improved fxnl use of dominant RUE throughout daily occupational performance.  (Progressing)       Start:  02/27/25 2/27/25: 4/5 RUE IR, ER, sh ext, sh flexion, supination, wrist ext         Pt. will be able to wipe bottom following toileting using dominant RUE. (Progressing)       Start:  02/27/25 2/27/25: Not able to wipe with RUE         Pt. will demonstrate improved FM coordination as evidence by completing Grooved Peg Board in </= 2:45 mins. (Progressing)       Start:  02/27/25 2/27/25: 3:23 mins         Pt. will improve handwriting legibility and maintain consistency of legibility to Fair+ for increased independence with legal, financial & medical mgmt. (Progressing)       Start:  02/27/25 2/27/25: Poor, not consistent as writing progresses to R and down page              Long Term Goals (Cont'd)       Pt. will improve R side proprioception to >/= Fair+ for reduced risk of falls and improved fxnl use of dominant R side throughout daily occupational performance. (Progressing)       Start:  02/27/25 2/27/25: Fair-/Fair         Pt. will perform global stretching HEP Ind. (Progressing)       Start:  02/27/25            Pt. will perform RUE strengthening HEP Ind. (Progressing)       Start:   02/27/25               Long Term Goals (Cont'd)       Pt. will increase typing speed to >/= 22 wpm with >/=90% accuracy for 3 min typing duration to increase independence & efficiency with work requirements.        Start:  03/06/25       3/6/25: 10 wpm, 91% accuracy           Pt. will increase ease & efficiency donning clothing over RUE as evidence by rating task </= 1/7 on FTRF.       Start:  03/06/25       3/6/25: 3/7         Pt. will increase efficiency & safety donning pants/shorts in standing as evidence by rating task </= 3/7 on FTRF.       Start:  03/06/25       3/6/25: 4/7            Short Term Goals       Pt. will demonstrate improved FM coordination as evidence by completing Grooved Peg Board in </= 3:00 mins. (Progressing)       Start:  02/27/25 2/27/25: 3:23 mins         Pt. will improve handwriting legibility and maintain consistency of legibility to Fair for increased independence with legal, financial & medical mgmt. (Progressing)       Start:  02/27/25 2/27/25: Poor, not consistent as writing progresses to R and down page         OT will provide training/education on global stretching HEP to promote tissue lengthening necessary for improved postural control, fxnl mobility, & fxnl performance throughout ADLs/IADLs. (Progressing)       Start:  02/27/25            TBA Functional Task Recording Form with accompanying goal(s) to follow as needed. (Met)       Start:  02/27/25    Resolved:  03/06/25    Opening containers/bottles, cutting with knife, putting R arm thru shirt/jacket sleeve, wiping bottom with RUE, phone/computer use         OT will provide training/education on RUE proximal strengthening/stability HEP. (Progressing)       Start:  02/27/25               Short Term Goals (Cont'd)       Pt. will increase typing speed to >/= 17 wpm with >/= 90% accuracy for 3 min typing duration to increase independence & efficiency with work requirements.        Start:  03/06/25       3/6/25: 10 wpm,  91% accuracy         Pt. will increase ease and independence with wiping bottom using dominant RUE as evidence by scoring task </= 2/7 on FTRF.       Start:  03/06/25       3/6/25: 4/7             Sukumar Holt OT

## 2025-03-07 DIAGNOSIS — Z95.810 ICD (IMPLANTABLE CARDIOVERTER-DEFIBRILLATOR) IN PLACE: Primary | ICD-10-CM

## 2025-03-10 LAB
OHS CV AF BURDEN PERCENT: < 1
OHS CV DC REMOTE DEVICE TYPE: NORMAL
OHS CV RV PACING PERCENT: 0 %

## 2025-03-11 PROBLEM — I63.412 EMBOLIC STROKE INVOLVING LEFT MIDDLE CEREBRAL ARTERY: Status: ACTIVE | Noted: 2025-03-11

## 2025-03-11 PROBLEM — I63.412 CEREBROVASCULAR ACCIDENT (CVA) DUE TO EMBOLISM OF LEFT MIDDLE CEREBRAL ARTERY: Status: ACTIVE | Noted: 2025-03-11

## 2025-03-11 PROBLEM — R29.898 WEAKNESS OF RIGHT LEG: Status: ACTIVE | Noted: 2025-03-11

## 2025-03-11 PROBLEM — Z79.01 CURRENT USE OF LONG TERM ANTICOAGULATION: Status: ACTIVE | Noted: 2025-03-11

## 2025-03-14 ENCOUNTER — CLINICAL SUPPORT (OUTPATIENT)
Dept: REHABILITATION | Facility: HOSPITAL | Age: 70
End: 2025-03-14
Payer: MEDICARE

## 2025-03-14 DIAGNOSIS — R27.9 UNSPECIFIED LACK OF COORDINATION: ICD-10-CM

## 2025-03-14 DIAGNOSIS — M62.81 MUSCLE WEAKNESS OF RIGHT UPPER EXTREMITY: ICD-10-CM

## 2025-03-14 DIAGNOSIS — Z78.9 ALTERATION IN INSTRUMENTAL ACTIVITIES OF DAILY LIVING (IADL): ICD-10-CM

## 2025-03-14 DIAGNOSIS — Z78.9 ALTERATION IN PERFORMANCE OF ACTIVITIES OF DAILY LIVING: Primary | ICD-10-CM

## 2025-03-14 PROCEDURE — 97530 THERAPEUTIC ACTIVITIES: CPT | Mod: PO

## 2025-03-14 PROCEDURE — 97112 NEUROMUSCULAR REEDUCATION: CPT | Mod: PO

## 2025-03-18 NOTE — PROGRESS NOTES
"  Outpatient Rehab    Occupational Therapy Visit    Patient Name: Peter Munoz  MRN: [id number]  YOB: 1955  Encounter Date: 3/14/2025    Therapy Diagnosis:   Encounter Diagnoses   Name Primary?    Alteration in performance of activities of daily living Yes    Alteration in instrumental activities of daily living (IADL)     Unspecified lack of coordination     Muscle weakness of right upper extremity      Physician: Racquel Lazcano MD    Physician Orders: Eval and Treat  Medical Diagnosis: Cerebrovascular accident (CVA) due to embolism of left middle cerebral artery    Visit # / Visits Authorized: 2 / 20  Date of Evaluation:  2/27/2025  Insurance Authorization Period: 3/6/2025 to 12/31/2025  Plan of Care Certification:  2/27/2025 to 5/13/2025       Time In: 0720   Time Out: 0800  Total Time: 40   Total Billable Time: 40             Subjective   "I feel like an idiot... The direction following and sequencing like you said is an issue. I mean it's never been good but the strokes gotten it worse.".  Pain reported as 0/10.      Objective       Last objective measures completed 3/6/25    Treatment:  Therapeutic Activity  TA 1: Seated at table top, bimanual integration task with majority use of R hand when applicable to perform in hand manipulation including shifting, translation, distal rotation, and sustained tip and 3-pt pinch to replicate 2D to 3D design and complete /VS puzzle, x3 sets with Min/Mod A for problem solving with trials 1 & 2, pt. unable to successfully complete 3rd set despite extended time and v/c for problem solving/strategy development  Balance/Neuromuscular Re-Education  NMR 1: Dynamic standing balance with pt. squatting>unilateral weight shift with over head reach in scaption plane with gross grasp against min resistance, alternating R/L sides x6 reps each side, 2 trials; pt. then performed squat with reach to retrieve set of items from across midline>stand and separate items behind " back for proprioception  NMR 2: To address proprioception, coordination, sequencing, and RUE AROM deficits, pt. performed squat with unilateral reach across midline to retrieve pair of items > stand & separate items behind back, alternating reaching with R/L sides, mod v/c for sequencing  NMR 3: Squat >amplitude based weight shifting to retrieve squigz over head outside JOHN in scaption plane alternating R/L sides > suction squigz together behind back > separate pair over head, min v/c for sequencing and increased force use of R hand with in hand manipulation skills vs. over compensation with L hand    Time Entry(in minutes):  Neuromuscular Re-Education Time Entry: 25  Therapeutic Activity Time Entry: 15    Assessment & Plan   Assessment: Pt. required v/c for sequencing with multi-step GM coordination NMR Tasks this date. No pain reported with RUE reaching over head or behind back, and overall improved fluidity of movements and reduced rigidity noted thru RUE with amplitude based reaching/grasping. Pt. exhibiting difficulty with /VS sequencing/problem solving task at beginner level, endorsing problem solving and figure ground/form constancy have never been a strong suite, however have noted to decline since following CVA. OT discussed purpose/rationale re: tx interventions and plan to continue to incorporate higher level cognition and /VS skills into tx interventions to address sensorimotor and cognitive deficits limiting independence, safety & efficiency with daily occupations.  Evaluation/Treatment Tolerance: Patient tolerated treatment well    Patient will continue to benefit from skilled outpatient occupational therapy to address the deficits listed in the problem list box on initial evaluation, provide pt/family education and to maximize pt's level of independence in the home and community environment.     Patient's spiritual, cultural, and educational needs considered and patient agreeable to plan of care  and goals.           Plan: OT plan to address RUE sensorimotor, proprioceptive/kinesthetic movement awareness, global joint stiffness/mm tightness, balance, fxnl ax tolerance, and executive functioning deficits at frequency of 2x/week necessary to reduce pt's risk of falls, further debility, and secondary musculoskeletal damage/impairments, increase safety, independence, and efficiency with ADLs, IADLs, work, and  roles/responsibilities, and improve overall QOL.    Goals:   Active       Long Term Goals       Pt. will increase AROM in all deficits areas of RUE. (Progressing)       Start:  02/27/25 2/27/25: >/= 3/4 R shoulder extension, IR, supination, & wrist extension         Pt. will increase RUE MMT to 5/5 in all deficit areas for improved fxnl use of dominant RUE throughout daily occupational performance.  (Progressing)       Start:  02/27/25 2/27/25: 4/5 RUE IR, ER, sh ext, sh flexion, supination, wrist ext         Pt. will be able to wipe bottom following toileting using dominant RUE. (Progressing)       Start:  02/27/25 2/27/25: Not able to wipe with RUE         Pt. will demonstrate improved FM coordination as evidence by completing Grooved Peg Board in </= 2:45 mins. (Progressing)       Start:  02/27/25 2/27/25: 3:23 mins         Pt. will improve handwriting legibility and maintain consistency of legibility to Fair+ for increased independence with legal, financial & medical mgmt. (Progressing)       Start:  02/27/25 2/27/25: Poor, not consistent as writing progresses to R and down page              Long Term Goals (Cont'd)       Pt. will improve R side proprioception to >/= Fair+ for reduced risk of falls and improved fxnl use of dominant R side throughout daily occupational performance. (Progressing)       Start:  02/27/25 2/27/25: Fair-/Fair         Pt. will perform global stretching HEP Ind. (Progressing)       Start:  02/27/25            Pt. will perform RUE  strengthening HEP Ind. (Progressing)       Start:  02/27/25               Long Term Goals (Cont'd)       Pt. will increase typing speed to >/= 22 wpm with >/=90% accuracy for 3 min typing duration to increase independence & efficiency with work requirements.  (Progressing)       Start:  03/06/25       3/6/25: 10 wpm, 91% accuracy           Pt. will increase ease & efficiency donning clothing over RUE as evidence by rating task </= 1/7 on FTRF. (Progressing)       Start:  03/06/25       3/6/25: 3/7         Pt. will increase efficiency & safety donning pants/shorts in standing as evidence by rating task </= 3/7 on FTRF. (Progressing)       Start:  03/06/25       3/6/25: 4/7            Short Term Goals       Pt. will demonstrate improved FM coordination as evidence by completing Grooved Peg Board in </= 3:00 mins. (Progressing)       Start:  02/27/25 2/27/25: 3:23 mins         Pt. will improve handwriting legibility and maintain consistency of legibility to Fair for increased independence with legal, financial & medical mgmt. (Progressing)       Start:  02/27/25 2/27/25: Poor, not consistent as writing progresses to R and down page         OT will provide training/education on global stretching HEP to promote tissue lengthening necessary for improved postural control, fxnl mobility, & fxnl performance throughout ADLs/IADLs. (Progressing)       Start:  02/27/25            TBA Functional Task Recording Form with accompanying goal(s) to follow as needed. (Met)       Start:  02/27/25    Resolved:  03/06/25    Opening containers/bottles, cutting with knife, putting R arm thru shirt/jacket sleeve, wiping bottom with RUE, phone/computer use         OT will provide training/education on RUE proximal strengthening/stability HEP. (Progressing)       Start:  02/27/25               Short Term Goals (Cont'd)       Pt. will increase typing speed to >/= 17 wpm with >/= 90% accuracy for 3 min typing duration to increase  independence & efficiency with work requirements.  (Progressing)       Start:  03/06/25       3/6/25: 10 wpm, 91% accuracy         Pt. will increase ease and independence with wiping bottom using dominant RUE as evidence by scoring task </= 2/7 on FTRF. (Progressing)       Start:  03/06/25       3/6/25: 4/7             Sukumar Holt, OT

## 2025-03-21 ENCOUNTER — CLINICAL SUPPORT (OUTPATIENT)
Dept: REHABILITATION | Facility: HOSPITAL | Age: 70
End: 2025-03-21
Payer: MEDICARE

## 2025-03-21 DIAGNOSIS — Z78.9 ALTERATION IN PERFORMANCE OF ACTIVITIES OF DAILY LIVING: Primary | ICD-10-CM

## 2025-03-21 DIAGNOSIS — Z78.9 ALTERATION IN INSTRUMENTAL ACTIVITIES OF DAILY LIVING (IADL): ICD-10-CM

## 2025-03-21 DIAGNOSIS — M62.81 MUSCLE WEAKNESS OF RIGHT UPPER EXTREMITY: ICD-10-CM

## 2025-03-21 DIAGNOSIS — R27.9 UNSPECIFIED LACK OF COORDINATION: ICD-10-CM

## 2025-03-21 PROCEDURE — 97112 NEUROMUSCULAR REEDUCATION: CPT | Mod: PO

## 2025-03-21 PROCEDURE — 97530 THERAPEUTIC ACTIVITIES: CPT | Mod: PO

## 2025-03-24 ENCOUNTER — CLINICAL SUPPORT (OUTPATIENT)
Dept: REHABILITATION | Facility: HOSPITAL | Age: 70
End: 2025-03-24
Payer: MEDICARE

## 2025-03-24 DIAGNOSIS — Z78.9 ALTERATION IN PERFORMANCE OF ACTIVITIES OF DAILY LIVING: Primary | ICD-10-CM

## 2025-03-24 DIAGNOSIS — R27.9 UNSPECIFIED LACK OF COORDINATION: ICD-10-CM

## 2025-03-24 DIAGNOSIS — Z78.9 ALTERATION IN INSTRUMENTAL ACTIVITIES OF DAILY LIVING (IADL): ICD-10-CM

## 2025-03-24 DIAGNOSIS — M62.81 MUSCLE WEAKNESS OF RIGHT UPPER EXTREMITY: ICD-10-CM

## 2025-03-24 PROCEDURE — 97140 MANUAL THERAPY 1/> REGIONS: CPT | Mod: PO

## 2025-03-24 PROCEDURE — 97110 THERAPEUTIC EXERCISES: CPT | Mod: PO

## 2025-03-26 NOTE — PROGRESS NOTES
"  Outpatient Rehab    Occupational Therapy Visit    Patient Name: Peter Munoz  MRN: [id number]  YOB: 1955  Encounter Date: 3/21/2025    Therapy Diagnosis:   Encounter Diagnoses   Name Primary?    Alteration in performance of activities of daily living Yes    Alteration in instrumental activities of daily living (IADL)     Unspecified lack of coordination     Muscle weakness of right upper extremity      Physician: Racquel Lazcano MD    Physician Orders: Eval and Treat  Medical Diagnosis: Cerebrovascular accident (CVA) due to embolism of left middle cerebral artery    Visit # / Visits Authorized: 4 / 20  Insurance Authorization Period: 3/6/2025 to 12/31/2025  Date of Evaluation: 2/27/2025  Plan of Care Certification: 2/27/2025 to 5/13/2025       Time In: 1120   Time Out: 1200  Total Time: 40   Total Billable Time: 40           Subjective   "I don't know why but since my stroke I get really emotional with everything. My family makes fun of me because I'll just start crying out of nowhere." "It happened last night, I was struggling to get around in the bathroom at 4 am and my wife and I just started cracking up laughing, we couldn't stop." -pt. endorsing episodes of uncontrollable laughing at times.             Treatment:  Therapeutic Activity  TA 1: OT educated/demo'd to pt. posterior shoulder and IR self stretches with use of pole, discussed different set ups/positioning (i.e. supination vs. pronation, elbow flex vs. elbow ext) and use of LUE to facilitate increased PROM; pt. performed several reps in each set up to ensure proper carry over  TA 2: In hand manipulation with CW and CCW rotation for x2 golf balls, 2x10 reps each direction, increased difficulty with digit isolation and interdigit coord when rotating CCW  TA 3: Finger<>palm translation with stabilization, shifting, and distal rotation for poker chips and various sized coins, no significant difficulty noted however decreased fluidity and " speed on R when compared to performance with nondominant LUE  Balance/Neuromuscular Re-Education  NMR 1: Standing, bimanual integration task with velcro elvis in 1 hand and ball in other, pt. alternating between reaching in front of chest ball to elvis<>behind back remove ball from elvis, x10 reps each, increased difficulty achieving neutral humeral and forearm rotation with RUE behind back requiring over compensation from LUE  NMR 2: Seated, bimanual integration task to rotate x2 cones in CW and CCW fashions to metronome at 45 bpm, task challenge progressed to include different sized cones to further challenge shaping of grasp, motor planning and force grading, Good overall control and timing observed    Time Entry(in minutes):  Neuromuscular Re-Education Time Entry: 25  Therapeutic Activity Time Entry: 15    Assessment & Plan   Assessment: Pt. remains limited in proximal RUE AROM 2/2 increased tissue length imbalances and tissue adhesions, with OT educating pt. on posterior shoulder and chest stretches to promote tissue lengthening outside of therapy. He continues to demonstrate decreased in hand manipulation, coordination, and overall motor planning deficits with RUE, thus limiting fxnl use of dominant RUE throughout daily occupational performance. Pt. also endorsing symptoms/signs characteristic of PBA, with OT plan to further assess and provide appropriate referral if necessary.       Patient will continue to benefit from skilled outpatient occupational therapy to address the deficits listed in the problem list box on initial evaluation, provide pt/family education and to maximize pt's level of independence in the home and community environment.     Patient's spiritual, cultural, and educational needs considered and patient agreeable to plan of care and goals.           Plan:  OT plan to continue addressing RUE sensorimotor, proprioceptive/kinesthetic movement awareness, global joint stiffness/mm tightness, balance,  fxnl ax tolerance, and executive functioning deficits at frequency of 2x/week necessary to reduce pt's risk of falls, further debility, and secondary musculoskeletal damage/impairments, increase safety, independence, and efficiency with ADLs, IADLs, work, and  roles/responsibilities, and improve overall QOL.     Goals:   Active       Long Term Goals       Pt. will increase AROM in all deficits areas of RUE. (Progressing)       Start:  02/27/25 2/27/25: >/= 3/4 R shoulder extension, IR, supination, & wrist extension         Pt. will increase RUE MMT to 5/5 in all deficit areas for improved fxnl use of dominant RUE throughout daily occupational performance.  (Progressing)       Start:  02/27/25 2/27/25: 4/5 RUE IR, ER, sh ext, sh flexion, supination, wrist ext         Pt. will be able to wipe bottom following toileting using dominant RUE. (Progressing)       Start:  02/27/25 2/27/25: Not able to wipe with RUE         Pt. will demonstrate improved FM coordination as evidence by completing Grooved Peg Board in </= 2:45 mins. (Progressing)       Start:  02/27/25 2/27/25: 3:23 mins         Pt. will improve handwriting legibility and maintain consistency of legibility to Fair+ for increased independence with legal, financial & medical mgmt. (Progressing)       Start:  02/27/25 2/27/25: Poor, not consistent as writing progresses to R and down page              Long Term Goals (Cont'd)       Pt. will improve R side proprioception to >/= Fair+ for reduced risk of falls and improved fxnl use of dominant R side throughout daily occupational performance. (Progressing)       Start:  02/27/25 2/27/25: Fair-/Fair         Pt. will perform global stretching HEP Ind. (Progressing)       Start:  02/27/25            Pt. will perform RUE strengthening HEP Ind. (Progressing)       Start:  02/27/25               Long Term Goals (Cont'd)       Pt. will increase typing speed to >/= 22 wpm with  >/=90% accuracy for 3 min typing duration to increase independence & efficiency with work requirements.  (Progressing)       Start:  03/06/25       3/6/25: 10 wpm, 91% accuracy           Pt. will increase ease & efficiency donning clothing over RUE as evidence by rating task </= 1/7 on FTRF. (Progressing)       Start:  03/06/25       3/6/25: 3/7         Pt. will increase efficiency & safety donning pants/shorts in standing as evidence by rating task </= 3/7 on FTRF. (Progressing)       Start:  03/06/25       3/6/25: 4/7            Short Term Goals       Pt. will demonstrate improved FM coordination as evidence by completing Grooved Peg Board in </= 3:00 mins. (Progressing)       Start:  02/27/25 2/27/25: 3:23 mins         Pt. will improve handwriting legibility and maintain consistency of legibility to Fair for increased independence with legal, financial & medical mgmt. (Progressing)       Start:  02/27/25 2/27/25: Poor, not consistent as writing progresses to R and down page         OT will provide training/education on global stretching HEP to promote tissue lengthening necessary for improved postural control, fxnl mobility, & fxnl performance throughout ADLs/IADLs. (Progressing)       Start:  02/27/25            TBA Functional Task Recording Form with accompanying goal(s) to follow as needed. (Met)       Start:  02/27/25    Resolved:  03/06/25    Opening containers/bottles, cutting with knife, putting R arm thru shirt/jacket sleeve, wiping bottom with RUE, phone/computer use         OT will provide training/education on RUE proximal strengthening/stability HEP. (Progressing)       Start:  02/27/25               Short Term Goals (Cont'd)       Pt. will increase typing speed to >/= 17 wpm with >/= 90% accuracy for 3 min typing duration to increase independence & efficiency with work requirements.  (Progressing)       Start:  03/06/25       3/6/25: 10 wpm, 91% accuracy         Pt. will increase ease and  independence with wiping bottom using dominant RUE as evidence by scoring task </= 2/7 on FTRF. (Progressing)       Start:  03/06/25       3/6/25: 4/7             Sukumar Holt OT

## 2025-03-28 NOTE — PROGRESS NOTES
"  Outpatient Rehab    Occupational Therapy Visit    Patient Name: Peter Munoz  MRN: [id number]  YOB: 1955  Encounter Date: 3/24/2025    Therapy Diagnosis:   Encounter Diagnoses   Name Primary?    Alteration in performance of activities of daily living Yes    Alteration in instrumental activities of daily living (IADL)     Unspecified lack of coordination     Muscle weakness of right upper extremity      Physician: Racquel Lazcano MD    Physician Orders: Eval and Treat  Medical Diagnosis: Cerebrovascular accident (CVA) due to embolism of left middle cerebral artery    Visit # / Visits Authorized: 4 / 20  Insurance Authorization Period: 3/6/2025 to 12/31/2025  Date of Evaluation: 2/27/2025  Plan of Care Certification: 2/27/2025 to 5/13/2025      Time In: 1350   Time Out: 1430  Total Time: 40   Total Billable Time: 40             Subjective   "I feel better. Looser.".             Treatment:  Therapeutic Exercise  TE 1: OT reviewed shoulder IR self PROM/AAROM set up, with pt. completing x5 reps with sustained hold at tolerable end range. Standing, pt. performed several reps of RUE & chest stretches against wall including RUE positioned at <1/2 shoulder abduction, shoulder abduction & ER, and shoulder horizontal adduction (across midline), all with trunk rotation and sustained hold at tolerable end ranges, modeling & v/c required for proper form throughout. Pictorial/written instructions for carry over of doorway/wall stretches included in patient instructions.  TE 2: To promote global tissue lengthening, pt. performed series of full body stretches in quadruped and prone including child's pose <> cobra; thread the needle in child's pose <> thoracic rotation with over head reach in quadruped; child's pose <> unilateral forward step; prone<>axial rotation in side lying, all with holds at tolerable end ranges, several reps each stretch for both R and L sides, modeling and mod v/c for proper form  Manual " Therapy  MT 1: Pt. supine while OT performed manual B scap depression and retraction stretch holds along with manual TrP and myofascial release to B UT and levator for reduced tissue adhesions, improved joint mobility/positioning and improved pain free AROM    Time Entry(in minutes):  Manual Therapy Time Entry: 15  Therapeutic Exercise Time Entry: 25    Assessment & Plan   Assessment: OT educated pt. on chest, shoulder, and upper arm stretches to address anterior/posterior tissue length imbalances limiting AROM and eliciting pain; discussed appropriate positioning, frequency of performance, considerations/contraindications, and modifications as needed with written/pictorial handout included in patient instructions. Although strenuous, pt. denied pain and tolerated full body stretches in quadruped and prone well, with report of improved flexibility and reduced joint stiffness at end of tx session. OT discussed plan to continue to review global stretches, assess pt's response/tolerance, and provide training on HEPs as appropriate to promote further tissue lengthening outside of therapy sessions.  Evaluation/Treatment Tolerance: Patient tolerated treatment well    Patient will continue to benefit from skilled outpatient occupational therapy to address the deficits listed in the problem list box on initial evaluation, provide pt/family education and to maximize pt's level of independence in the home and community environment.     Patient's spiritual, cultural, and educational needs considered and patient agreeable to plan of care and goals.           Plan: OT plan to continue 2x/week to address RUE sensorimotor, proprioceptive/kinesthetic movement awareness, global joint stiffness/mm tightness, balance, fxnl ax tolerance, and executive functioning deficits at frequency of 2x/week necessary to reduce pt's risk of falls, further debility, and secondary musculoskeletal damage/impairments, increase safety, independence, and  efficiency with ADLs, IADLs, work, and  roles/responsibilities, and improve overall QOL.    Goals:   Active       Long Term Goals       Pt. will increase AROM in all deficits areas of RUE. (Progressing)       Start:  02/27/25 2/27/25: >/= 3/4 R shoulder extension, IR, supination, & wrist extension         Pt. will increase RUE MMT to 5/5 in all deficit areas for improved fxnl use of dominant RUE throughout daily occupational performance.  (Progressing)       Start:  02/27/25 2/27/25: 4/5 RUE IR, ER, sh ext, sh flexion, supination, wrist ext         Pt. will be able to wipe bottom following toileting using dominant RUE. (Progressing)       Start:  02/27/25 2/27/25: Not able to wipe with RUE         Pt. will demonstrate improved FM coordination as evidence by completing Grooved Peg Board in </= 2:45 mins. (Progressing)       Start:  02/27/25 2/27/25: 3:23 mins         Pt. will improve handwriting legibility and maintain consistency of legibility to Fair+ for increased independence with legal, financial & medical mgmt. (Progressing)       Start:  02/27/25 2/27/25: Poor, not consistent as writing progresses to R and down page              Long Term Goals (Cont'd)       Pt. will improve R side proprioception to >/= Fair+ for reduced risk of falls and improved fxnl use of dominant R side throughout daily occupational performance. (Progressing)       Start:  02/27/25 2/27/25: Fair-/Fair         Pt. will perform global stretching HEP Ind. (Progressing)       Start:  02/27/25            Pt. will perform RUE strengthening HEP Ind. (Progressing)       Start:  02/27/25               Long Term Goals (Cont'd)       Pt. will increase typing speed to >/= 22 wpm with >/=90% accuracy for 3 min typing duration to increase independence & efficiency with work requirements.  (Progressing)       Start:  03/06/25       3/6/25: 10 wpm, 91% accuracy           Pt. will increase ease & efficiency  donning clothing over RUE as evidence by rating task </= 1/7 on FTRF. (Progressing)       Start:  03/06/25       3/6/25: 3/7         Pt. will increase efficiency & safety donning pants/shorts in standing as evidence by rating task </= 3/7 on FTRF. (Progressing)       Start:  03/06/25       3/6/25: 4/7            Short Term Goals       Pt. will demonstrate improved FM coordination as evidence by completing Grooved Peg Board in </= 3:00 mins. (Progressing)       Start:  02/27/25 2/27/25: 3:23 mins         Pt. will improve handwriting legibility and maintain consistency of legibility to Fair for increased independence with legal, financial & medical mgmt. (Progressing)       Start:  02/27/25 2/27/25: Poor, not consistent as writing progresses to R and down page         OT will provide training/education on global stretching HEP to promote tissue lengthening necessary for improved postural control, fxnl mobility, & fxnl performance throughout ADLs/IADLs. (Progressing)       Start:  02/27/25            TBA Functional Task Recording Form with accompanying goal(s) to follow as needed. (Met)       Start:  02/27/25    Resolved:  03/06/25    Opening containers/bottles, cutting with knife, putting R arm thru shirt/jacket sleeve, wiping bottom with RUE, phone/computer use         OT will provide training/education on RUE proximal strengthening/stability HEP. (Progressing)       Start:  02/27/25               Short Term Goals (Cont'd)       Pt. will increase typing speed to >/= 17 wpm with >/= 90% accuracy for 3 min typing duration to increase independence & efficiency with work requirements.  (Progressing)       Start:  03/06/25       3/6/25: 10 wpm, 91% accuracy         Pt. will increase ease and independence with wiping bottom using dominant RUE as evidence by scoring task </= 2/7 on FTRF. (Progressing)       Start:  03/06/25       3/6/25: 4/7             Sukumar Holt OT

## 2025-03-28 NOTE — PATIENT INSTRUCTIONS
Chest and Arm Stretches (Doorway/Wall)    *All stretches should be performed within tolerable end ranges  *Hold each stretch for at least 10 seconds; never bounce or pulse in the stretch    Internal Rotation Stretch with Pole    - Holding onto a broom stick or cane, bring your arms behind your back  - To stretch the right shoulder, use your left hand to pull the stick to the left (your right arm should then be pulled towards the middle of your lower back/bottom)  - To stretch the left shoulder, use your right hand to pull the stick to the right     Internal Rotation Stretch with Towel    - Holding on to each end of a towel, bring your left arm behind your head and right arm behind your lower back  - Using your left arm, pull the towel up, allowing the right arm to also move from your lower back towards your middle back; hold this position to feel a stretch in your right arm and shoulder       External Rotation Chest Stretch         - Bring your arm out to the side of your body even with (or slightly below) shoulder height and bend your elbow to a right angle  - While maintaining this position, stabilize your elbow, forearm, and hand against a wall or doorway  - Next, step the opposite leg forward to feel a stretch in your chest, shoulder and upper arm  - You can slightly rotate your trunk and head away from the arm that you are stretching for a greater stretch if tolerable      Shoulder Abduction Chest Stretch     - Position your arm out to the side of your body even with (or slightly below) shoulder height and keep your elbow straight  - Stabilize your forearm and hand along a wall and rotate your head and trunk in the opposite direction of the arm you are stretching to feel a stretch through your chest, shoulder, and upper arm  *You can also hold on to the edge of a doorway (with the hand of the arm you're stretching) rather than stabilizing your hand/forearm against a wall if you feel a better stretch/more  supported this way      Posterior Shoulder/Upper Arm Stretch      OR (option 2)   - Standing in a doorway, reach your arm across your chest   - Hold on to the edge of the door frame and slightly rotate your trunk towards the arm that you are stretching to feel a stretch in the back of your shoulder and middle of your shoulder blade  *Alternative position for this stretch is the second picture

## 2025-03-31 ENCOUNTER — CLINICAL SUPPORT (OUTPATIENT)
Dept: REHABILITATION | Facility: HOSPITAL | Age: 70
End: 2025-03-31
Payer: MEDICARE

## 2025-03-31 DIAGNOSIS — R27.9 UNSPECIFIED LACK OF COORDINATION: ICD-10-CM

## 2025-03-31 DIAGNOSIS — Z78.9 ALTERATION IN INSTRUMENTAL ACTIVITIES OF DAILY LIVING (IADL): ICD-10-CM

## 2025-03-31 DIAGNOSIS — I63.412 CEREBROVASCULAR ACCIDENT (CVA) DUE TO EMBOLISM OF LEFT MIDDLE CEREBRAL ARTERY: ICD-10-CM

## 2025-03-31 DIAGNOSIS — M62.81 MUSCLE WEAKNESS OF RIGHT UPPER EXTREMITY: ICD-10-CM

## 2025-03-31 DIAGNOSIS — Z78.9 ALTERATION IN PERFORMANCE OF ACTIVITIES OF DAILY LIVING: Primary | ICD-10-CM

## 2025-03-31 DIAGNOSIS — Z74.09 IMPAIRED FUNCTIONAL MOBILITY, BALANCE, GAIT, AND ENDURANCE: ICD-10-CM

## 2025-03-31 PROCEDURE — 97112 NEUROMUSCULAR REEDUCATION: CPT | Mod: PO,CQ

## 2025-03-31 PROCEDURE — 97110 THERAPEUTIC EXERCISES: CPT | Mod: PO

## 2025-03-31 PROCEDURE — 97110 THERAPEUTIC EXERCISES: CPT | Mod: PO,CQ

## 2025-03-31 PROCEDURE — 97112 NEUROMUSCULAR REEDUCATION: CPT | Mod: PO

## 2025-03-31 NOTE — PROGRESS NOTES
"  Outpatient Rehab    Occupational Therapy Visit    Patient Name: Peter Munoz  MRN: [id number]  YOB: 1955  Encounter Date: 3/31/2025    Therapy Diagnosis:   Encounter Diagnoses   Name Primary?    Alteration in performance of activities of daily living Yes    Alteration in instrumental activities of daily living (IADL)     Unspecified lack of coordination     Muscle weakness of right upper extremity      Physician: Racquel Lazcano MD    Physician Orders: Eval and Treat  Medical Diagnosis: Cerebrovascular accident (CVA) due to embolism of left middle cerebral artery    Visit # / Visits Authorized: 5 / 20  Insurance Authorization Period: 3/6/2025 to 12/31/2025  Date of Evaluation: 2/27/2025  Plan of Care Certification:  2/27/2025 to 5/13/2025      Time In: 0851   Time Out: 0930  Total Time: 39   Total Billable Time: 39             Subjective   "I haven't done my stretching since I was last here. I know I need to." "This should be easy with my right hand, this is my pitching hand.".             Treatment:  Therapeutic Exercise  TE 1: Reviewed written/pictorial self stretching HEP with handout provided in addition to ability to access from previous tx session visit summary; pt. performing several reps on each arm of IR stretch with towel behind back/over head with sustained hold at tolerable/available end ranges; OT reiterated importance/benefits of daily compliance with global stretching routine to address global tissue length imbalances compromising postural control and limiting fxnl AROM  TE 2: Bimanual integration task with velcro elvis and ball, x10 reps each direction to pull ball from elvis with BUE's extended in front of chest<>stick ball to elvis behind back with focus on slow, controlled mvmts to allow for adequate shoulder, periscapular, thoracic, and arm tissue lengthening  Balance/Neuromuscular Re-Education  NMR 1: In standing, VMI and reaction speed task with multidirectional RUE reaching at " varying planes/heights/distances to also challenge dynamic standing balance and additional categorical and sequential naming component, mod difficulty with dual tasking and difficulty overall with listing/naming common fruits/vegetables requiring intermittent assistance for naming, improved accuracy and reaction speed with RUE as duration progressed; task then alternated to reaction speed with LUE while throwing/releasing with RUE, then progressed to aiming for targets at various heights/planes, Fair accuracy  NMR 2: Modified squat to retrieve squig on vertical surface > amplitude based thoracic extension, weight shifting and RUE reaching to suction squig to surface over head in scaption plane, min v/c for sequencing and interlimb coordination    Time Entry(in minutes):  Neuromuscular Re-Education Time Entry: 24  Therapeutic Exercise Time Entry: 15    Assessment & Plan   Assessment: Pt. reported feeling fatigued following global stretching/Wbing exercises performed during previous tx session, denied any increased pain/discomfort and endorsed feeling overall looser and better global mobility. OT reviewed and provided pt. with hard copy of HEP provided in after visit summary from previous session per report of not completing at home. Pt. had difficulty with dual tasking and overall simple categorical naming and continues to benefit from dynamic, interlimb coordination tasks with divided attention components to promote proprioceptive/kinesthetic mvmt awareness, motor learning, and skill acquisition necessary to reduce risk for falls, further debility, and increase safety, independence and efficiency with all daily occupations.  Evaluation/Treatment Tolerance: Patient tolerated treatment well    Patient will continue to benefit from skilled outpatient occupational therapy to address the deficits listed in the problem list box on initial evaluation, provide pt/family education and to maximize pt's level of independence  in the home and community environment.     Patient's spiritual, cultural, and educational needs considered and patient agreeable to plan of care and goals.           Plan: OT plan to continue 2x/week to address RUE sensorimotor, proprioceptive/kinesthetic movement awareness, global joint stiffness/mm tightness, balance, fxnl ax tolerance, and executive functioning deficits to reduce risk of falls, further debility, and secondary musculoskeletal damage/impairments, increase safety, independence, and efficiency with ADLs, IADLs, work, and  roles/responsibilities, and improve overall QOL.    Goals:   Active       Long Term Goals       Pt. will increase AROM in all deficits areas of RUE. (Progressing)       Start:  02/27/25 2/27/25: >/= 3/4 R shoulder extension, IR, supination, & wrist extension         Pt. will increase RUE MMT to 5/5 in all deficit areas for improved fxnl use of dominant RUE throughout daily occupational performance.  (Progressing)       Start:  02/27/25 2/27/25: 4/5 RUE IR, ER, sh ext, sh flexion, supination, wrist ext         Pt. will be able to wipe bottom following toileting using dominant RUE. (Progressing)       Start:  02/27/25 2/27/25: Not able to wipe with RUE         Pt. will demonstrate improved FM coordination as evidence by completing Grooved Peg Board in </= 2:45 mins. (Progressing)       Start:  02/27/25 2/27/25: 3:23 mins         Pt. will improve handwriting legibility and maintain consistency of legibility to Fair+ for increased independence with legal, financial & medical mgmt. (Progressing)       Start:  02/27/25 2/27/25: Poor, not consistent as writing progresses to R and down page              Long Term Goals (Cont'd)       Pt. will improve R side proprioception to >/= Fair+ for reduced risk of falls and improved fxnl use of dominant R side throughout daily occupational performance. (Progressing)       Start:  02/27/25 2/27/25:  Fair-/Fair         Pt. will perform global stretching HEP Ind. (Progressing)       Start:  02/27/25            Pt. will perform RUE strengthening HEP Ind. (Progressing)       Start:  02/27/25               Long Term Goals (Cont'd)       Pt. will increase typing speed to >/= 22 wpm with >/=90% accuracy for 3 min typing duration to increase independence & efficiency with work requirements.  (Progressing)       Start:  03/06/25       3/6/25: 10 wpm, 91% accuracy           Pt. will increase ease & efficiency donning clothing over RUE as evidence by rating task </= 1/7 on FTRF. (Progressing)       Start:  03/06/25       3/6/25: 3/7         Pt. will increase efficiency & safety donning pants/shorts in standing as evidence by rating task </= 3/7 on FTRF. (Progressing)       Start:  03/06/25       3/6/25: 4/7            Short Term Goals       Pt. will demonstrate improved FM coordination as evidence by completing Grooved Peg Board in </= 3:00 mins. (Progressing)       Start:  02/27/25 2/27/25: 3:23 mins         Pt. will improve handwriting legibility and maintain consistency of legibility to Fair for increased independence with legal, financial & medical mgmt. (Progressing)       Start:  02/27/25 2/27/25: Poor, not consistent as writing progresses to R and down page         OT will provide training/education on global stretching HEP to promote tissue lengthening necessary for improved postural control, fxnl mobility, & fxnl performance throughout ADLs/IADLs. (Progressing)       Start:  02/27/25            TBA Functional Task Recording Form with accompanying goal(s) to follow as needed. (Met)       Start:  02/27/25    Resolved:  03/06/25    Opening containers/bottles, cutting with knife, putting R arm thru shirt/jacket sleeve, wiping bottom with RUE, phone/computer use         OT will provide training/education on RUE proximal strengthening/stability HEP. (Progressing)       Start:  02/27/25               Short  Term Goals (Cont'd)       Pt. will increase typing speed to >/= 17 wpm with >/= 90% accuracy for 3 min typing duration to increase independence & efficiency with work requirements.  (Progressing)       Start:  03/06/25       3/6/25: 10 wpm, 91% accuracy         Pt. will increase ease and independence with wiping bottom using dominant RUE as evidence by scoring task </= 2/7 on FTRF. (Progressing)       Start:  03/06/25       3/6/25: 4/7             Sukumar Holt OT

## 2025-03-31 NOTE — PROGRESS NOTES
"  Outpatient Rehab    Physical Therapy Visit    Patient Name: Peter Munoz  MRN: [id number]  YOB: 1955  Encounter Date: 3/31/2025    Therapy Diagnosis:   Encounter Diagnoses   Name Primary?    Alteration in performance of activities of daily living Yes    Alteration in instrumental activities of daily living (IADL)     Unspecified lack of coordination     Muscle weakness of right upper extremity     Cerebrovascular accident (CVA) due to embolism of left middle cerebral artery     Impaired functional mobility, balance, gait, and endurance        Physician: Racquel Lazcano MD    Physician Orders: Eval and Treat  Medical Diagnosis: Cerebrovascular accident (CVA) due to embolism of left middle cerebral artery    Visit # / Visits Authorized:  1 / 20  Insurance Authorization Period: 2/27/2025 to 12/31/2025  Date of Evaluation:  2/27/2025   Insurance Authorization Period: 2/24/2025 to 12/31/2025  Plan of Care Certification:  2/27/2025 to 4/24/2025                 Time In: 0930   Time Out: 1015  Total Time: 45   Total Billable Time: 45 minutes     FOTO:  Intake Score:  %  Survey Score 1:  %  Survey Score 2:  %         Subjective   "No pain. Doing alright. Feel tight and inflexible and legs feel week.".         Objective            Treatment:  Therapeutic Exercise  TE 1: Seated Sci Fit B U and LE reciprocation 8 minutes for CV endurance  TE 2: Standing calf stretch 3x30"  TE 3: Leg Press 3x10 reps dL w/100# ; 2x10 sL B 60# ea  Balance/Neuromuscular Re-Education  NMR 1: Sit to stand from mat table 2x10 reps , no UE support  NMR 2: Standing heel raies 2x10  NMR 3: Standing Hip Abd 2x10- GTB  NMR 4: Standing Hip Extension 2x10-GTB  NMR 5: Standing Hip Flexion 2x10-GTB  NMR 6: Squats over chair 2x10  NMR 7: High knee raise march 3 laps, parellel bars no UE support           Assessment & Plan   Assessment: Presest for first follow up. Seen following OT. Instructed in basic HEP in standing and provided sent to " Email. Also provided a GTB for home use.  Instructed to perform at kitchen sink for UE support.       Patient will continue to benefit from skilled outpatient physical therapy to address the deficits listed in the problem list box on initial evaluation, provide pt/family education and to maximize pt's level of independence in the home and community environment.     Patient's spiritual, cultural, and educational needs considered and patient agreeable to plan of care and goals.           Plan: Continue POC    Goals:   Active       Short term goals        Patient to be independent with established home exercise program        Start:  02/27/25    Expected End:  03/27/25            Patient to improve hip flexion MMT by 1/3        Start:  02/27/25    Expected End:  03/27/25            Patient to improve right  knee flexion MMT by 1/3       Start:  02/27/25    Expected End:  03/27/25            Patient to improve FGA score to 21/30       Start:  02/27/25    Expected End:  03/27/25               long term goals        Patient to improve right hip flexion strength MMT score by 2/3        Start:  02/27/25    Expected End:  04/24/25            Patient to improve FGA score to 25/30       Start:  02/27/25    Expected End:  04/24/25            Patient to improve 6 MWT distance to 1450 feet        Start:  02/27/25    Expected End:  04/24/25            patient to improve BLE SLS time to 5 seconds        Start:  02/27/25    Expected End:  04/24/25                Peter Kim PTA

## 2025-04-02 ENCOUNTER — CLINICAL SUPPORT (OUTPATIENT)
Dept: REHABILITATION | Facility: HOSPITAL | Age: 70
End: 2025-04-02
Payer: MEDICARE

## 2025-04-02 DIAGNOSIS — R27.9 UNSPECIFIED LACK OF COORDINATION: ICD-10-CM

## 2025-04-02 DIAGNOSIS — Z78.9 ALTERATION IN PERFORMANCE OF ACTIVITIES OF DAILY LIVING: Primary | ICD-10-CM

## 2025-04-02 DIAGNOSIS — Z78.9 ALTERATION IN INSTRUMENTAL ACTIVITIES OF DAILY LIVING (IADL): ICD-10-CM

## 2025-04-02 DIAGNOSIS — Z74.09 IMPAIRED FUNCTIONAL MOBILITY, BALANCE, GAIT, AND ENDURANCE: Primary | ICD-10-CM

## 2025-04-02 DIAGNOSIS — M62.81 MUSCLE WEAKNESS OF RIGHT UPPER EXTREMITY: ICD-10-CM

## 2025-04-02 PROCEDURE — 97530 THERAPEUTIC ACTIVITIES: CPT | Mod: PO

## 2025-04-02 PROCEDURE — 97112 NEUROMUSCULAR REEDUCATION: CPT | Mod: PO

## 2025-04-02 PROCEDURE — 97110 THERAPEUTIC EXERCISES: CPT | Mod: PO

## 2025-04-02 NOTE — PROGRESS NOTES
Outpatient Rehab    Physical Therapy Progress Note    Patient Name: Peter Munoz  MRN: [id number]  YOB: 1955  Encounter Date: 4/2/2025    Therapy Diagnosis:   Encounter Diagnosis   Name Primary?    Impaired functional mobility, balance, gait, and endurance Yes     Physician: Racquel Lazcano MD    Physician Orders: Eval and Treat  Medical Diagnosis: Cerebrovascular accident (CVA) due to embolism of left middle cerebral artery    Visit # / Visits Authorized:  2 / 20  Insurance Authorization Period: 2/27/2025 to 12/31/2025  Date of Evaluation:  2/27/2025   Insurance Authorization Period: 2/24/2025 to 12/31/2025  Plan of Care Certification:  2/27/2025 to 4/24/2025      PT/PTA:     Number of PTA visits since last PT visit:   Time In: 1345   Time Out: 1430  Total Time: 45   Total Billable Time:  45 minutes     FOTO:  Intake Score:  %  Survey Score 1:  %  Survey Score 2:  %         Subjective   feeling good, does not have any questions on HEP.  Pain reported as 0/10.      Objective         Treatment:  Therapeutic Exercise  TE 1: 10 minutes on SCIFIT seated elliptical level 4.0  TE 3: Leg Press 3x10 reps dL w/120#  Balance/Neuromuscular Re-Education  NMR 1: 2 x 10 reps double large cone tapping, CGA  NMR 2: 10 reps step up to sand dune with BLE leading  NMR 3: 2 x 10 reps step up to sand dune with BLE leading and opposite leg hike  Therapeutic Activity  TA 1: 10 reps sit to stands while holding tidal tank  TA 2: 2 x 10 reps sit to stands while holding tidal tank and standing on foam fitter  TA 3: 2 x 100 feet ambulation with horizontal head turns  TA 4: 2 x 100 feet ambulation with vertical head turns  TA 5: 2 x 100 feet ambulation with diagonal head turns    Time Entry(in minutes):  Neuromuscular Re-Education Time Entry: 10  Therapeutic Activity Time Entry: 20  Therapeutic Exercise Time Entry: 15    Assessment & Plan   Assessment: Peter tolerated today's session very well. The patient reports moderate  fatigue and requires two seated rest breaks throughout session. minimal UE support required throughout balance activities. THe patient would benefit from continued PT intervention to address remaining funcitonal mobility deficits.  Evaluation/Treatment Tolerance: Patient tolerated treatment well    Patient will continue to benefit from skilled outpatient physical therapy to address the deficits listed in the problem list box on initial evaluation, provide pt/family education and to maximize pt's level of independence in the home and community environment.     Patient's spiritual, cultural, and educational needs considered and patient agreeable to plan of care and goals.           Plan: focus on dynamic balance, strength and endurance training as tolerated    Goals:   Active       Short term goals        Patient to be independent with established home exercise program  (Progressing)       Start:  02/27/25    Expected End:  03/27/25            Patient to improve hip flexion MMT by 1/3  (Progressing)       Start:  02/27/25    Expected End:  03/27/25            Patient to improve right  knee flexion MMT by 1/3 (Progressing)       Start:  02/27/25    Expected End:  03/27/25            Patient to improve FGA score to 21/30 (Progressing)       Start:  02/27/25    Expected End:  03/27/25               long term goals        Patient to improve right hip flexion strength MMT score by 2/3  (Progressing)       Start:  02/27/25    Expected End:  04/24/25            Patient to improve FGA score to 25/30 (Progressing)       Start:  02/27/25    Expected End:  04/24/25            Patient to improve 6 MWT distance to 1450 feet  (Progressing)       Start:  02/27/25    Expected End:  04/24/25            patient to improve BLE SLS time to 5 seconds  (Progressing)       Start:  02/27/25    Expected End:  04/24/25                Josette Murphy, PT

## 2025-04-04 NOTE — PROGRESS NOTES
Outpatient Rehab    Occupational Therapy Visit    Patient Name: Peter Munoz  MRN: [id number]  YOB: 1955  Encounter Date: 4/7/2025    Therapy Diagnosis:   Encounter Diagnoses   Name Primary?    Alteration in performance of activities of daily living Yes    Alteration in instrumental activities of daily living (IADL)     Unspecified lack of coordination     Muscle weakness of right upper extremity        Physician: Racquel Lazcano MD    Physician Orders: Eval and Treat  Medical Diagnosis: Cerebrovascular accident (CVA) due to embolism of left middle cerebral artery    Visit # / Visits Authorized: 7 / 20  Insurance Authorization Period: 3/6/2025 to 12/31/2025  Date of Evaluation: 2/27/2025  Plan of Care Certification:  2/27/2025 to 5/13/2025      Time In: 0930   Time Out: 1015  Total Time: 45   Total Billable Time: 45        Precautions     Standard, fall, cognition       Subjective   had a few drinks last night, feeling a little hungover this morning and nauseous.  Pain reported as 0/10. n/a        Treatment:  Therapeutic Exercise  TE 1: seated at UBE on level 2.0 for 10 min -- switching direction at mid point. MET goal and target of 1.8-2.0  TE 2: sit<>stand from EOM with 5# tidal tank for chest press x2 sets of 10: mirror feedback for modulation and control of tank  TE 3: seated with tidal tank for shoulder press x2 sets of 10 reps -- continued feedback for modulation of control; edu on breathe control with task  Therapeutic Activity  TA 1: standing: Maze completion with Squiz-- R hand for marker use and eraser  TA 2: standing for handwriting for signature x5 reps on vertical mirror for proximal stability and control  TA 3: seated for fine motor coordonation and dexterity task: grooved pegs:Wearing reading glasses on this date 4/7/2025. Time for in/out on 4/7/2025 and board positioned in horizontal plane R: 177 s and L: 137 s    Time Entry(in minutes):  Therapeutic Activity Time Entry:  18  Therapeutic Exercise Time Entry: 27    Assessment & Plan   Assessment: Peter tolerated sesson well today. He demo fair tolerated for bilateral strengthening and endurance tasks and self reported improvement for in hand manipulation. Although grooved peg board not completed in standardized manner, he does objectively demo improvement in bilateral manipulation of pegs for placement into and out of board in more timely manner. Edu on importance of use of hands for coordination carry over at home (ie: legal pad next to chair at home to practice signature). Goals remain appropriate at this time and pt remains highly motivated.       Patient will continue to benefit from skilled outpatient occupational therapy to address the deficits listed in the problem list box on initial evaluation, provide pt/family education and to maximize pt's level of independence in the home and community environment.     Patient's spiritual, cultural, and educational needs considered and patient agreeable to plan of care and goals.           Plan: OT plan to continue 2x/week to address RUE sensorimotor, proprioceptive/kinesthetic movement awareness, global joint stiffness/mm tightness, balance, fxnl ax tolerance, and executive functioning deficits to reduce risk of falls, further debility, and secondary musculoskeletal damage/impairments, increase safety, independence, and efficiency with ADLs, IADLs, work, and  roles/responsibilities, and improve overall QOL.    Goals:   Active       Long Term Goals       Pt. will increase AROM in all deficits areas of RUE. (Ongoing)       Start:  02/27/25       2/27/25: >/= 3/4 R shoulder extension, IR, supination, & wrist extension         Pt. will increase RUE MMT to 5/5 in all deficit areas for improved fxnl use of dominant RUE throughout daily occupational performance.  (Ongoing)       Start:  02/27/25       2/27/25: 4/5 RUE IR, ER, sh ext, sh flexion, supination, wrist ext         Pt. will  be able to wipe bottom following toileting using dominant RUE. (Ongoing)       Start:  02/27/25 2/27/25: Not able to wipe with RUE         Pt. will demonstrate improved FM coordination as evidence by completing Grooved Peg Board in </= 2:45 mins. (Ongoing)       Start:  02/27/25 2/27/25: 3:23 mins         Pt. will improve handwriting legibility and maintain consistency of legibility to Fair+ for increased independence with legal, financial & medical mgmt. (Ongoing)       Start:  02/27/25 2/27/25: Poor, not consistent as writing progresses to R and down page              Long Term Goals (Cont'd)       Pt. will improve R side proprioception to >/= Fair+ for reduced risk of falls and improved fxnl use of dominant R side throughout daily occupational performance. (Ongoing)       Start:  02/27/25 2/27/25: Fair-/Fair         Pt. will perform global stretching HEP Ind. (Ongoing)       Start:  02/27/25            Pt. will perform RUE strengthening HEP Ind. (Ongoing)       Start:  02/27/25               Long Term Goals (Cont'd)       Pt. will increase typing speed to >/= 22 wpm with >/=90% accuracy for 3 min typing duration to increase independence & efficiency with work requirements.  (Ongoing)       Start:  03/06/25       3/6/25: 10 wpm, 91% accuracy           Pt. will increase ease & efficiency donning clothing over RUE as evidence by rating task </= 1/7 on FTRF. (Ongoing)       Start:  03/06/25       3/6/25: 3/7         Pt. will increase efficiency & safety donning pants/shorts in standing as evidence by rating task </= 3/7 on FTRF. (Ongoing)       Start:  03/06/25       3/6/25: 4/7            Short Term Goals       Pt. will demonstrate improved FM coordination as evidence by completing Grooved Peg Board in </= 3:00 mins. (Ongoing)       Start:  02/27/25 2/27/25: 3:23 mins         Pt. will improve handwriting legibility and maintain consistency of legibility to Fair for increased  independence with legal, financial & medical mgmt. (Ongoing)       Start:  02/27/25 2/27/25: Poor, not consistent as writing progresses to R and down page         OT will provide training/education on global stretching HEP to promote tissue lengthening necessary for improved postural control, fxnl mobility, & fxnl performance throughout ADLs/IADLs. (Ongoing)       Start:  02/27/25            TBA Functional Task Recording Form with accompanying goal(s) to follow as needed. (Met)       Start:  02/27/25    Resolved:  03/06/25    Opening containers/bottles, cutting with knife, putting R arm thru shirt/jacket sleeve, wiping bottom with RUE, phone/computer use         OT will provide training/education on RUE proximal strengthening/stability HEP. (Ongoing)       Start:  02/27/25               Short Term Goals (Cont'd)       Pt. will increase typing speed to >/= 17 wpm with >/= 90% accuracy for 3 min typing duration to increase independence & efficiency with work requirements.  (Ongoing)       Start:  03/06/25       3/6/25: 10 wpm, 91% accuracy         Pt. will increase ease and independence with wiping bottom using dominant RUE as evidence by scoring task </= 2/7 on FTRF. (Ongoing)       Start:  03/06/25       3/6/25: 4/7             ALISSA Vazquez

## 2025-04-06 NOTE — PROGRESS NOTES
Outpatient Rehab    Physical Therapy Visit    Patient Name: Peter Munoz  MRN: [id number]  YOB: 1955  Encounter Date: 4/7/2025    Therapy Diagnosis:   Encounter Diagnosis   Name Primary?    Impaired functional mobility, balance, gait, and endurance Yes     Physician: Racquel Lazcano MD    Physician Orders: Eval and Treat  Medical Diagnosis: Cerebrovascular accident (CVA) due to embolism of left middle cerebral artery    Visit # / Visits Authorized:  3 / 20  Insurance Authorization Period: 2/27/2025 to 12/31/2025  Date of Evaluation:  2/27/2025   Insurance Authorization Period: 2/24/2025 to 12/31/2025  Plan of Care Certification:  2/27/2025 to 4/24/2025      PT/PTA:     Number of PTA visits since last PT visit:   Time In: 0845   Time Out: 0925  Total Time: 40   Total Billable Time:  40 minutes     FOTO:  Intake Score:  %  Survey Score 1:  %  Survey Score 2:  %         Subjective   feeling a little woozy this morning.  Pain reported as 0/10.      Objective            Treatment:  Therapeutic Exercise  TE 1: 10 minutes on SCIFIT seated elliptical level 6.0  TE 2: 3 x 10 reps heel raises with 1# ankle weights  TE 3: 3 x 10 reps marches with 1# ankle weights  TE 4: 3 x 10 reps hamstring curls with 1# ankle weights  Balance/Neuromuscular Re-Education  NMR 1: 2 x 30 seconds BLE leading split stance on sand dune with eyes closed, CGA  Therapeutic Activity  TA 1: 2 x 10 reps BLE leading step up to sand dune with opposite leg hike , CGA  TA 2: 2 x 10 reps sit to stands while holding tidal tank and standing on foam fitter  TA 3: 2 x 100 feet ambulation with horizontal head turns while holding tidal tank  TA 4: 2 x 100 feet ambulation with vertical head turns while holding tidal tank  TA 5: multiple seated rest breaks throughout session    Time Entry(in minutes):  Therapeutic Activity Time Entry: 15  Therapeutic Exercise Time Entry: 25    Assessment & Plan   Assessment: Peter tolerated today's session fairly. He  reports having a drink the night prior and believes he may be hungover. Due to this, the patient reports nausea and requires many seated rest breaks throughout session. The patient would likely benefit from continued PT intervention to address remaining functional mobility deficits.  Evaluation/Treatment Tolerance: Patient tolerated treatment well    Patient will continue to benefit from skilled outpatient physical therapy to address the deficits listed in the problem list box on initial evaluation, provide pt/family education and to maximize pt's level of independence in the home and community environment.     Patient's spiritual, cultural, and educational needs considered and patient agreeable to plan of care and goals.           Plan: focus on dynamic balance, strength and endurance training as tolerated    Goals:   Active       Short term goals        Patient to be independent with established home exercise program  (Progressing)       Start:  02/27/25    Expected End:  03/27/25            Patient to improve hip flexion MMT by 1/3  (Progressing)       Start:  02/27/25    Expected End:  03/27/25            Patient to improve right  knee flexion MMT by 1/3 (Progressing)       Start:  02/27/25    Expected End:  03/27/25            Patient to improve FGA score to 21/30 (Progressing)       Start:  02/27/25    Expected End:  03/27/25               long term goals        Patient to improve right hip flexion strength MMT score by 2/3  (Progressing)       Start:  02/27/25    Expected End:  04/24/25            Patient to improve FGA score to 25/30 (Progressing)       Start:  02/27/25    Expected End:  04/24/25            Patient to improve 6 MWT distance to 1450 feet  (Progressing)       Start:  02/27/25    Expected End:  04/24/25            patient to improve BLE SLS time to 5 seconds  (Progressing)       Start:  02/27/25    Expected End:  04/24/25                Josette Murphy PT

## 2025-04-07 ENCOUNTER — CLINICAL SUPPORT (OUTPATIENT)
Dept: REHABILITATION | Facility: HOSPITAL | Age: 70
End: 2025-04-07
Payer: MEDICARE

## 2025-04-07 VITALS — DIASTOLIC BLOOD PRESSURE: 98 MMHG | SYSTOLIC BLOOD PRESSURE: 161 MMHG | HEART RATE: 66 BPM

## 2025-04-07 DIAGNOSIS — Z74.09 IMPAIRED FUNCTIONAL MOBILITY, BALANCE, GAIT, AND ENDURANCE: Primary | ICD-10-CM

## 2025-04-07 DIAGNOSIS — Z78.9 ALTERATION IN PERFORMANCE OF ACTIVITIES OF DAILY LIVING: Primary | ICD-10-CM

## 2025-04-07 DIAGNOSIS — Z78.9 ALTERATION IN INSTRUMENTAL ACTIVITIES OF DAILY LIVING (IADL): ICD-10-CM

## 2025-04-07 DIAGNOSIS — M62.81 MUSCLE WEAKNESS OF RIGHT UPPER EXTREMITY: ICD-10-CM

## 2025-04-07 DIAGNOSIS — R27.9 UNSPECIFIED LACK OF COORDINATION: ICD-10-CM

## 2025-04-07 PROCEDURE — 97530 THERAPEUTIC ACTIVITIES: CPT | Mod: PO

## 2025-04-07 PROCEDURE — 97110 THERAPEUTIC EXERCISES: CPT | Mod: PO

## 2025-04-07 NOTE — PROGRESS NOTES
Mr. Munoz is a patient of Dr. Good and was last seen in clinic 3/2024 with NP.      Subjective:   Patient ID:  Peter Munoz is a 69 y.o. male who presents for follow up of ICD  .     HPI:    Mr. Munoz is a 69 y.o. male with HTN, prostate CA, apical hypertrophic CMP, CVA, scICD DX lead here for follow up.    Background:  Primary Cardiologist: Isael Brink MD     Mr. 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 He was admitted with a stroke in 2023, manifested by right sided weakness and dysarthria. He received TNK. CTA showed left M1 occlusion and he underwent thrombectomy. Symptoms improved. ECHO 3/26 showed LV c/f apical hypertrophic CM w/ apical pseudoaneurysm, EF 50%, and segmental LV wall motion abnormalities. He had no history of syncope. Reports an uncle may have  of a heart attack in his 50s. All 3 brothers have HOCM. His last angiogram was in  when diagnosed with HOCM but was told he had clean coronaries. Cardiology consulted for management and possible AC therapy. EP was consulted for consideration of ICD in a patient with HOCM and apical aneurysm. He and I had a long conversation. He elected to think about ICD. He cancelled follow-up with me post-discharge.     2023: Mr. Munoz returns for follow-up discussion regarding ICD implantation. Recent MRI noted apical HCM and an apical aneurysm. No LGE.  ECG sinus rhythm with PACs and LVH  We discussed the etiology and pathophysiology of sudden cardiac death in a patient population such as his and how an ICD may provide mortality benefit. We discussed the long-term implications of device implantation including infection risk, inappropriate shocks, and device/lead malfunction requiring further procedures. We discussed the alternatives, benefits and risks of the procedure including pain, infection, bleeding, injury to lung causing pneumothorax  requiring tube placement, injury to heart valves, puncture of the heart leading to pericardial effusion or tamponade requiring tube drainage, heart attack, stroke and death. All questions answered. He agrees to proceed.     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 No new cardiac complaints. No CP, worsening CONTRERAS, edema, palps, LH, syncope reported.      Update (04/23/2025):    Today he states he feels overall good. Denies CP, SOB, dizziness, syncope, palpitations.    He is currently taking eliquis 5mg BID for stroke prophylaxis (hx of CVA) and denies significant bleeding episodes. He is currently being treated with carvedilol 25mg BID for HR control.  Kidney function is stable, with a creatinine of 1.2 on 2/2025.     Device Interrogation single chamber ICD DX lead(04/23/2025) reveals an intrinsic c/w SB/SR with stable lead and device function. No arrhythmias or treated episodes were noted.  He paces  0% in the RV. Estimated battery remaining capacity 100%    I have personally reviewed the patient's EKG today, which shows SR at 66 bpm. FL interval is 158. QRS is 84. QTc is 444/465.    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 atypical takotsubo cardiomyopathy, suspect former.  Mild left atrial enlargement.  The left ventricle is normal in size with low normal systolic function.  The estimated ejection fraction is 50%.  Indeterminate left ventricular diastolic function.  There are segmental left ventricular wall motion abnormalities.  Suspect apical aneurysm source of ischemic  stroke.  Intermediate central venous pressure (8 mmHg).  The estimated PA systolic pressure is 17 mmHg.  Mild tricuspid regurgitation.    Current Outpatient Medications   Medication Sig    abiraterone (ZYTIGA) 500 mg Tab Take 1,000 mg by mouth.    apixaban (ELIQUIS) 5 mg Tab Take 1 tablet (5 mg total) by mouth 2 (two) times daily.    ascorbic acid, vitamin C, (VITAMIN C) 1000 MG tablet 1 tablet.    atorvastatin (LIPITOR) 20 MG tablet Take 1 tablet (20 mg total) by mouth every evening.    buPROPion (WELLBUTRIN SR) 150 MG TBSR 12 hr tablet Take 1 tablet by mouth twice a day    buPROPion (WELLBUTRIN SR) 150 MG TBSR 12 hr tablet Take 2 tablets (300 mg total) by mouth every morning AND 2 tablets (300 mg total) every evening.    buPROPion (WELLBUTRIN XL) 150 MG TB24 tablet Take 150 mg by mouth once daily.    carvediloL (COREG) 25 MG tablet Take 1 tablet (25 mg total) by mouth 2 (two) times daily.    loratadine (CLARITIN) 10 mg tablet 1 tablet Orally Once a day for 30 day(s)    prednisoLONE acetate (PRED FORTE) 1 % DrpS Place 1 drop into the right eye 3 (three) times daily for 10 days    predniSONE (DELTASONE) 5 MG tablet Take 5 mg by mouth 2 (two) times daily.    telmisartan (MICARDIS) 80 MG Tab TAKE ONE TABLET BY MOUTH EVERY EVENING    zinc gluconate 50 mg tablet 1 tablet.    cetirizine (ZYRTEC) 10 mg Cap 1 tablet. (Patient not taking: Reported on 4/23/2025)    FLUoxetine 10 MG capsule Take by mouth. (Patient not taking: Reported on 4/23/2025)    hydroCHLOROthiazide (HYDRODIURIL) 25 MG tablet Take 1 tablet (25 mg total) by mouth once daily. (Patient not taking: Reported on 4/23/2025)    RSVPreF3 antigen-AS01E, PF, (AREXVY, PF,) 120 mcg/0.5 mL SusR vaccine Inject into the muscle. (Patient not taking: Reported on 4/23/2025)    RSVPreF3 antigen-AS01E, PF, (AREXVY, PF,) 120 mcg/0.5 mL SusR vaccine Inject into the muscle. (Patient not taking: Reported on 4/23/2025)    turmeric 400 mg Cap as directed (Patient not taking:  "Reported on 4/23/2025)     No current facility-administered medications for this visit.       Review of Systems   Constitutional: Negative for chills, diaphoresis, fever and malaise/fatigue.   HENT: Negative.     Eyes:  Negative for pain and visual disturbance.   Cardiovascular:  Negative for chest pain, dyspnea on exertion, irregular heartbeat, leg swelling, near-syncope, orthopnea, palpitations and syncope.   Respiratory:  Negative for cough, hemoptysis, shortness of breath and wheezing.    Endocrine: Negative.    Hematologic/Lymphatic: Negative.    Skin: Negative.  Negative for rash.   Musculoskeletal:  Negative for falls, joint swelling and myalgias.   Gastrointestinal: Negative.  Negative for hematemesis and melena.   Genitourinary: Negative.  Negative for hematuria.   Neurological:  Negative for dizziness, focal weakness, headaches and light-headedness.   Psychiatric/Behavioral:  Negative for altered mental status.        Objective:          BP (!) 146/89 (Patient Position: Sitting)   Pulse 66   Ht 5' 7" (1.702 m)   Wt 90.2 kg (198 lb 13.7 oz)   BMI 31.15 kg/m²     Physical Exam  Vitals reviewed.   Constitutional:       General: He is not in acute distress.     Appearance: He is not ill-appearing.   HENT:      Head: Atraumatic.      Nose: No congestion.   Eyes:      Extraocular Movements: Extraocular movements intact.   Cardiovascular:      Rate and Rhythm: Normal rate and regular rhythm.      Pulses: Normal pulses.   Pulmonary:      Effort: Pulmonary effort is normal. No respiratory distress.   Abdominal:      General: Abdomen is flat.      Palpations: Abdomen is soft.   Musculoskeletal:         General: Normal range of motion.      Cervical back: Normal range of motion.      Right lower leg: No edema.      Left lower leg: No edema.   Skin:     General: Skin is warm and dry.      Findings: No rash.   Neurological:      General: No focal deficit present.      Mental Status: He is alert and oriented to " person, place, and time. Mental status is at baseline.   Psychiatric:         Mood and Affect: Mood normal.         Behavior: Behavior normal. Behavior is cooperative.           Lab Results   Component Value Date     02/11/2025     10/02/2024    K 4.2 02/11/2025    K 3.8 10/02/2024    MG 1.9 10/02/2024    BUN 23.0 02/11/2025    BUN 17 10/02/2024    CREATININE 1.20 02/11/2025    CREATININE 1.3 10/02/2024    ALT 18 02/11/2025    ALT 22 05/01/2023    AST 14 02/11/2025    AST 16 05/01/2023    HGB 15.8 11/27/2023    HCT 46.2 11/27/2023    TSH 3.352 03/26/2023    LDLCALC 61.6 (L) 05/01/2023       Recent Labs   Lab 03/26/23  0143 11/27/23  0958   INR 1.1 1.1       Assessment:     1. ICD (implantable cardioverter-defibrillator) in place    2. Hypertrophic cardiomyopathy    3. Primary hypertension    4. History of CVA (cerebrovascular accident)    5. Stricture of artery        Plan:     In summary, Mr. Munoz is a 69 y.o. male with HTN, prostate CA, apical hypertrophic CMP, CVA, ICD here for follow up.  Doing well from a device perspective with stable lead and device function. No arrhythmia noted (Dx lead). On eliquis for CVA prophylaxis. No RV pacing. No CHF symptoms. He follows with general cardiology.   Continue current medication regimen and device settings. Continue routine device clinic appts as scheduled.      *A copy of this note has been sent to Dr. Good*    Follow up in about 1 year (around 4/23/2026), or if symptoms worsen or fail to improve.    ------------------------------------------------------------------    Smitha King DNP  Cardiac Electrophysiology

## 2025-04-07 NOTE — PROGRESS NOTES
"  Outpatient Rehab    Occupational Therapy Visit    Patient Name: Peter Munoz  MRN: [id number]  YOB: 1955  Encounter Date: 4/2/2025    Therapy Diagnosis:   Encounter Diagnoses   Name Primary?    Alteration in performance of activities of daily living Yes    Alteration in instrumental activities of daily living (IADL)     Unspecified lack of coordination     Muscle weakness of right upper extremity      Physician: Racquel Lazcano MD    Physician Orders: Eval and Treat  Medical Diagnosis: Cerebrovascular accident (CVA) due to embolism of left middle cerebral artery    Visit # / Visits Authorized: 6 / 20  Insurance Authorization Period: 3/6/2025 to 12/31/2025  Date of Evaluation: 2/27/2025  Plan of Care Certification:  2/27/2025 to 5/13/2025        Time In: 1305   Time Out: 1345  Total Time: 40   Total Billable Time: 40             Subjective   "This should be so simple for me, I don't know why I can't do this.".             Treatment:  Therapeutic Activity  TA 1: To reduce global mm tightness, joint stiffness, and promote tissue lengthening necessary for reduced pain and improved fxnl AROM, in quadruped, pt. performed several reps each of child's pose<>cobra & child's pose with unilateral UE "thread the needle"<>forward weight shift with thoracic rotation & UE over head reach, sustained holds at available end ranges with initial modeling for proper form  Balance/Neuromuscular Re-Education  NMR 1: Bimanual integration task with divided attention component to bounce ball between B hands in stance while completing categorical naming  NMR 2: VMI task RUE motor control, timing, force grading, and rapid alternation between elbow and wrist flex/ext with sustained pronation while performing fxnl mobility forward and backwards, then progressed to performing with incorporation of categorical naming, initially increased difficulty with force grading and timing, requiring v/c and modeling for sustained extensor " force production and effort; max difficulty also when divided attention component incorporated, however improved once accuracy and control of task improved  NMR 3: VMI task complexity further progressed with incorporation of alternating between UE movement patterns while walking forward (i.e. reaching and force production with task components moving with gravity) vs. backwards (i.e. reaching and force production with task components movement against gravity), mod v/c for consistency with movement patterns and alternating sequencing, improved coordination and control as task duration progressed, cont'd to demo difficulty with categorical naming while performing motor tasks    Time Entry(in minutes):  Neuromuscular Re-Education Time Entry: 25  Therapeutic Activity Time Entry: 15    Assessment & Plan   Assessment: Pt. continues to demonstrate difficulty coordinating motor performance with cognitive/divided attention incorporation. Although requiring initial cueing and modeling for motor planning, force production, and consistency with alternating sequencing of movement patterns, pt. did demonstrate improved RUE coordination, motor control, and timing as task durations/trials progressed.  Evaluation/Treatment Tolerance: Patient tolerated treatment well    Patient will continue to benefit from skilled outpatient occupational therapy to address the deficits listed in the problem list box on initial evaluation, provide pt/family education and to maximize pt's level of independence in the home and community environment.     Patient's spiritual, cultural, and educational needs considered and patient agreeable to plan of care and goals.           Plan: OT plan to continue 2x/week to address RUE sensorimotor, proprioceptive/kinesthetic movement awareness, global joint stiffness/mm tightness, balance, fxnl ax tolerance, and executive functioning deficits to reduce risk of falls, further debility, and secondary musculoskeletal  damage/impairments, increase safety, independence, and efficiency with ADLs, IADLs, work, and  roles/responsibilities, and improve overall QOL.    Goals:   Active       Long Term Goals       Pt. will increase AROM in all deficits areas of RUE. (Ongoing)       Start:  02/27/25 2/27/25: >/= 3/4 R shoulder extension, IR, supination, & wrist extension         Pt. will increase RUE MMT to 5/5 in all deficit areas for improved fxnl use of dominant RUE throughout daily occupational performance.  (Ongoing)       Start:  02/27/25 2/27/25: 4/5 RUE IR, ER, sh ext, sh flexion, supination, wrist ext         Pt. will be able to wipe bottom following toileting using dominant RUE. (Ongoing)       Start:  02/27/25 2/27/25: Not able to wipe with RUE         Pt. will demonstrate improved FM coordination as evidence by completing Grooved Peg Board in </= 2:45 mins. (Ongoing)       Start:  02/27/25 2/27/25: 3:23 mins         Pt. will improve handwriting legibility and maintain consistency of legibility to Fair+ for increased independence with legal, financial & medical mgmt. (Ongoing)       Start:  02/27/25 2/27/25: Poor, not consistent as writing progresses to R and down page              Long Term Goals (Cont'd)       Pt. will improve R side proprioception to >/= Fair+ for reduced risk of falls and improved fxnl use of dominant R side throughout daily occupational performance. (Ongoing)       Start:  02/27/25 2/27/25: Fair-/Fair         Pt. will perform global stretching HEP Ind. (Ongoing)       Start:  02/27/25            Pt. will perform RUE strengthening HEP Ind. (Ongoing)       Start:  02/27/25               Long Term Goals (Cont'd)       Pt. will increase typing speed to >/= 22 wpm with >/=90% accuracy for 3 min typing duration to increase independence & efficiency with work requirements.  (Ongoing)       Start:  03/06/25       3/6/25: 10 wpm, 91% accuracy           Pt. will increase  ease & efficiency donning clothing over RUE as evidence by rating task </= 1/7 on FTRF. (Ongoing)       Start:  03/06/25       3/6/25: 3/7         Pt. will increase efficiency & safety donning pants/shorts in standing as evidence by rating task </= 3/7 on FTRF. (Ongoing)       Start:  03/06/25       3/6/25: 4/7            Short Term Goals       Pt. will demonstrate improved FM coordination as evidence by completing Grooved Peg Board in </= 3:00 mins. (Ongoing)       Start:  02/27/25 2/27/25: 3:23 mins         Pt. will improve handwriting legibility and maintain consistency of legibility to Fair for increased independence with legal, financial & medical mgmt. (Ongoing)       Start:  02/27/25 2/27/25: Poor, not consistent as writing progresses to R and down page         OT will provide training/education on global stretching HEP to promote tissue lengthening necessary for improved postural control, fxnl mobility, & fxnl performance throughout ADLs/IADLs. (Ongoing)       Start:  02/27/25            TBA Functional Task Recording Form with accompanying goal(s) to follow as needed. (Met)       Start:  02/27/25    Resolved:  03/06/25    Opening containers/bottles, cutting with knife, putting R arm thru shirt/jacket sleeve, wiping bottom with RUE, phone/computer use         OT will provide training/education on RUE proximal strengthening/stability HEP. (Ongoing)       Start:  02/27/25               Short Term Goals (Cont'd)       Pt. will increase typing speed to >/= 17 wpm with >/= 90% accuracy for 3 min typing duration to increase independence & efficiency with work requirements.  (Ongoing)       Start:  03/06/25       3/6/25: 10 wpm, 91% accuracy         Pt. will increase ease and independence with wiping bottom using dominant RUE as evidence by scoring task </= 2/7 on FTRF. (Ongoing)       Start:  03/06/25       3/6/25: 4/7             Sukumar Holt OT

## 2025-04-09 ENCOUNTER — CLINICAL SUPPORT (OUTPATIENT)
Dept: REHABILITATION | Facility: HOSPITAL | Age: 70
End: 2025-04-09
Payer: MEDICARE

## 2025-04-09 DIAGNOSIS — R27.9 UNSPECIFIED LACK OF COORDINATION: ICD-10-CM

## 2025-04-09 DIAGNOSIS — Z78.9 ALTERATION IN PERFORMANCE OF ACTIVITIES OF DAILY LIVING: Primary | ICD-10-CM

## 2025-04-09 DIAGNOSIS — Z78.9 ALTERATION IN INSTRUMENTAL ACTIVITIES OF DAILY LIVING (IADL): ICD-10-CM

## 2025-04-09 DIAGNOSIS — M62.81 MUSCLE WEAKNESS OF RIGHT UPPER EXTREMITY: ICD-10-CM

## 2025-04-09 DIAGNOSIS — I63.412 CEREBROVASCULAR ACCIDENT (CVA) DUE TO EMBOLISM OF LEFT MIDDLE CEREBRAL ARTERY: ICD-10-CM

## 2025-04-09 DIAGNOSIS — Z74.09 IMPAIRED FUNCTIONAL MOBILITY, BALANCE, GAIT, AND ENDURANCE: ICD-10-CM

## 2025-04-09 PROCEDURE — 97112 NEUROMUSCULAR REEDUCATION: CPT | Mod: PO,CQ

## 2025-04-09 PROCEDURE — 97110 THERAPEUTIC EXERCISES: CPT | Mod: PO,CQ

## 2025-04-09 PROCEDURE — 97530 THERAPEUTIC ACTIVITIES: CPT | Mod: PO,CQ

## 2025-04-09 PROCEDURE — 97530 THERAPEUTIC ACTIVITIES: CPT | Mod: PO

## 2025-04-09 NOTE — PROGRESS NOTES
"  Outpatient Rehab    Physical Therapy Visit    Patient Name: Peter Munoz  MRN: [id number]  YOB: 1955  Encounter Date: 4/9/2025    Therapy Diagnosis:   Encounter Diagnoses   Name Primary?    Alteration in performance of activities of daily living Yes    Alteration in instrumental activities of daily living (IADL)     Impaired functional mobility, balance, gait, and endurance     Cerebrovascular accident (CVA) due to embolism of left middle cerebral artery     Muscle weakness of right upper extremity          Physician: Racquel Lazcano MD    Physician Orders: Eval and Treat  Medical Diagnosis: Cerebrovascular accident (CVA) due to embolism of left middle cerebral artery    Visit # / Visits Authorized:  4 / 20  Insurance Authorization Period: 2/27/2025 to 12/31/2025  Date of Evaluation:  2/27/2025   Insurance Authorization Period: 2/24/2025 to 12/31/2025  Plan of Care Certification:  2/27/2025 to 4/24/2025      PT/PTA:     Number of PTA visits since last PT visit:   Time In:   0930  Time Out: 1015   Total Time:  45   Total Billable Time:  45 minutes     FOTO:  Intake Score:  %  Survey Score 1:  %  Survey Score 2:  %         Subjective "I do feel like I'm making progress. I Walk about a mile every day."            Objective            Treatment:   Therapeutic Exercise  TE 1: 10 minutes on SCIFIT seated elliptical level 6.0  TE 2: 3 x 10 reps heel raises with 1# ankle weights  TE 3: 3 x 10 reps marches with 1# ankle weights  TE 4: 3 x 10 reps hamstring curls with 1# ankle weights  TE 5: 3 x 10 reps of horizontal  Leg Press dL 130#  Balance/Neuromuscular Re-Education  NMR 1: 2 x 30 seconds BLE leading split stance on sand dune with eyes closed, CGA  Therapeutic Activity  TA 1: 2 x 10 reps BLE leading step up to sand dune with opposite leg hike , CGA  TA 2: 2 x 10 reps sit to stands while holding tidal tank and standing on foam fitter  TA 3: 2 x 100 feet ambulation with horizontal head turns while holding " tidal tank  TA 4: 2 x 100 feet ambulation with vertical head turns while holding tidal tank  TA 5: multiple seated rest breaks throughout session           Assessment & Plan   Assessment:  Peter tolerated today's session well. The patient would likely benefit from continued PT intervention to address remaining functional mobility deficits.        Patient will continue to benefit from skilled outpatient physical therapy to address the deficits listed in the problem list box on initial evaluation, provide pt/family education and to maximize pt's level of independence in the home and community environment.     Patient's spiritual, cultural, and educational needs considered and patient agreeable to plan of care and goals.           Plan:  Continue plan of care     Goals:   Active       Short term goals        Patient to be independent with established home exercise program  (Progressing)       Start:  02/27/25    Expected End:  03/27/25            Patient to improve hip flexion MMT by 1/3  (Progressing)       Start:  02/27/25    Expected End:  03/27/25            Patient to improve right  knee flexion MMT by 1/3 (Progressing)       Start:  02/27/25    Expected End:  03/27/25            Patient to improve FGA score to 21/30 (Progressing)       Start:  02/27/25    Expected End:  03/27/25               long term goals        Patient to improve right hip flexion strength MMT score by 2/3  (Progressing)       Start:  02/27/25    Expected End:  04/24/25            Patient to improve FGA score to 25/30 (Progressing)       Start:  02/27/25    Expected End:  04/24/25            Patient to improve 6 MWT distance to 1450 feet  (Progressing)       Start:  02/27/25    Expected End:  04/24/25            patient to improve BLE SLS time to 5 seconds  (Progressing)       Start:  02/27/25    Expected End:  04/24/25                Peter Kim, PTA

## 2025-04-14 ENCOUNTER — CLINICAL SUPPORT (OUTPATIENT)
Dept: REHABILITATION | Facility: HOSPITAL | Age: 70
End: 2025-04-14
Payer: MEDICARE

## 2025-04-14 DIAGNOSIS — M62.81 MUSCLE WEAKNESS OF RIGHT UPPER EXTREMITY: ICD-10-CM

## 2025-04-14 DIAGNOSIS — Z78.9 ALTERATION IN PERFORMANCE OF ACTIVITIES OF DAILY LIVING: Primary | ICD-10-CM

## 2025-04-14 DIAGNOSIS — Z78.9 ALTERATION IN INSTRUMENTAL ACTIVITIES OF DAILY LIVING (IADL): ICD-10-CM

## 2025-04-14 DIAGNOSIS — R27.9 UNSPECIFIED LACK OF COORDINATION: ICD-10-CM

## 2025-04-14 DIAGNOSIS — I63.412 CEREBROVASCULAR ACCIDENT (CVA) DUE TO EMBOLISM OF LEFT MIDDLE CEREBRAL ARTERY: ICD-10-CM

## 2025-04-14 DIAGNOSIS — Z74.09 IMPAIRED FUNCTIONAL MOBILITY, BALANCE, GAIT, AND ENDURANCE: ICD-10-CM

## 2025-04-14 PROCEDURE — 97530 THERAPEUTIC ACTIVITIES: CPT | Mod: PO

## 2025-04-14 PROCEDURE — 97530 THERAPEUTIC ACTIVITIES: CPT | Mod: PO,CQ

## 2025-04-14 PROCEDURE — 97112 NEUROMUSCULAR REEDUCATION: CPT | Mod: PO,CQ

## 2025-04-14 PROCEDURE — 97110 THERAPEUTIC EXERCISES: CPT | Mod: PO,CQ

## 2025-04-14 PROCEDURE — 97112 NEUROMUSCULAR REEDUCATION: CPT | Mod: PO

## 2025-04-14 NOTE — PROGRESS NOTES
Outpatient Rehab    Occupational Therapy Visit    Patient Name: Peter Munoz  MRN: [id number]  YOB: 1955  Encounter Date: 4/14/2025    Therapy Diagnosis:   Encounter Diagnoses   Name Primary?    Alteration in performance of activities of daily living Yes    Alteration in instrumental activities of daily living (IADL)     Unspecified lack of coordination     Muscle weakness of right upper extremity      Physician: Racquel Lazcano MD    Physician Orders: Eval and Treat  Medical Diagnosis: Cerebrovascular accident (CVA) due to embolism of left middle cerebral artery    Visit # / Visits Authorized: 9 / 20  Insurance Authorization Period: 3/6/2025 to 12/31/2025  Date of Evaluation: 2/27/2025  Plan of Care Certification:  2/27/2025 to 5/13/2025        Time In: 0852   Time Out: 0935  Total Time: 43   Total Billable Time: 43           Subjective   Pt. endorsing difficulty with writing on small lines/confined spaces and when time constraint and/or distracting environment factors present.             Treatment:  Therapeutic Activity  TA 1: Seated at table, handwriting practice on lined paper, several reps of printing first/last name and address; pt. then wrote grocery list with immediate recall component (x5 items), x2 rounds, 4/5 items recalled, min v/c for reduced speed and increased amplitude  TA 2: Replicated amplitude based pre-handwriting strokes, x3 reps each loops and peaks/pits, Good replication and consistency  TA 3: Discussed setting up environment/schedule for success with handwriting, especially when required to write based on verbal prompts  Balance/Neuromuscular Re-Education  NMR 1: Bimanual integration task with bilateral reaching behind back to remove velcro block from elvis in other hand>weight shift to reach over head in frontal plane to place on target, x8 reps each side; bilateral over head reach to remove block from velcro elvis over head>rotate to place on targe at shoulder height in  posterolateral direction, x8 reps on L, 2x8 reps on R, min v/c & modeling for proper form, seqeuencing, and amplitude/effort with R side  NMR 2: Bilateral shoulder horizontal abduction>trunk/hip rotation with UE reach across midline to remove velcro block from elvis>place on target in frontal plane, x8 reps on each side    Time Entry(in minutes):  Neuromuscular Re-Education Time Entry: 20  Therapeutic Activity Time Entry: 23    Assessment & Plan   Assessment: NMR tasks this date targeting proprioception, motor planning, sequencing, interlimb coordination, axial mobility, and weight shifting, with min v/c required for effort/amplitude when reaching/grasping with RUE. Pt. demonstrated good response to v/c as evidence by improved handwriting legibility and consistency with maintaining amplitude and spacing even as task duration progressed. He endorses ongoing difficulty maintaining handwriting legibility when required to write within confined space and/or when memory or distracting environment factors are involved, with OT plan to simulate in following session(s).       Patient will continue to benefit from skilled outpatient occupational therapy to address the deficits listed in the problem list box on initial evaluation, provide pt/family education and to maximize pt's level of independence in the home and community environment.     Patient's spiritual, cultural, and educational needs considered and patient agreeable to plan of care and goals.           Plan: OT plan to continue 2x/week to address RUE sensorimotor, proprioceptive/kinesthetic movement awareness, global joint stiffness/mm tightness, balance, fxnl ax tolerance, and executive functioning deficits to reduce risk of falls, further debility, and secondary musculoskeletal damage/impairments, increase safety, independence, and efficiency with ADLs, IADLs, work, and  roles/responsibilities, and improve overall QOL.    Goals:   Active       Long Term  Goals       Pt. will increase AROM in all deficits areas of RUE. (Ongoing)       Start:  02/27/25 2/27/25: >/= 3/4 R shoulder extension, IR, supination, & wrist extension         Pt. will increase RUE MMT to 5/5 in all deficit areas for improved fxnl use of dominant RUE throughout daily occupational performance.  (Ongoing)       Start:  02/27/25 2/27/25: 4/5 RUE IR, ER, sh ext, sh flexion, supination, wrist ext         Pt. will be able to wipe bottom following toileting using dominant RUE. (Ongoing)       Start:  02/27/25 2/27/25: Not able to wipe with RUE         Pt. will demonstrate improved FM coordination as evidence by completing Grooved Peg Board in </= 2:45 mins. (Ongoing)       Start:  02/27/25 2/27/25: 3:23 mins         Pt. will improve handwriting legibility and maintain consistency of legibility to Fair+ for increased independence with legal, financial & medical mgmt. (Ongoing)       Start:  02/27/25 2/27/25: Poor, not consistent as writing progresses to R and down page              Long Term Goals (Cont'd)       Pt. will improve R side proprioception to >/= Fair+ for reduced risk of falls and improved fxnl use of dominant R side throughout daily occupational performance. (Ongoing)       Start:  02/27/25 2/27/25: Fair-/Fair         Pt. will perform global stretching HEP Ind. (Ongoing)       Start:  02/27/25            Pt. will perform RUE strengthening HEP Ind. (Ongoing)       Start:  02/27/25               Long Term Goals (Cont'd)       Pt. will increase typing speed to >/= 22 wpm with >/=90% accuracy for 3 min typing duration to increase independence & efficiency with work requirements.  (Ongoing)       Start:  03/06/25       3/6/25: 10 wpm, 91% accuracy           Pt. will increase ease & efficiency donning clothing over RUE as evidence by rating task </= 1/7 on FTRF. (Ongoing)       Start:  03/06/25       3/6/25: 3/7         Pt. will increase efficiency & safety  donning pants/shorts in standing as evidence by rating task </= 3/7 on FTRF. (Ongoing)       Start:  03/06/25       3/6/25: 4/7            Short Term Goals       Pt. will demonstrate improved FM coordination as evidence by completing Grooved Peg Board in </= 3:00 mins. (Ongoing)       Start:  02/27/25 2/27/25: 3:23 mins         Pt. will improve handwriting legibility and maintain consistency of legibility to Fair for increased independence with legal, financial & medical mgmt. (Ongoing)       Start:  02/27/25 2/27/25: Poor, not consistent as writing progresses to R and down page         OT will provide training/education on global stretching HEP to promote tissue lengthening necessary for improved postural control, fxnl mobility, & fxnl performance throughout ADLs/IADLs. (Ongoing)       Start:  02/27/25            TBA Functional Task Recording Form with accompanying goal(s) to follow as needed. (Met)       Start:  02/27/25    Resolved:  03/06/25    Opening containers/bottles, cutting with knife, putting R arm thru shirt/jacket sleeve, wiping bottom with RUE, phone/computer use         OT will provide training/education on RUE proximal strengthening/stability HEP. (Ongoing)       Start:  02/27/25               Short Term Goals (Cont'd)       Pt. will increase typing speed to >/= 17 wpm with >/= 90% accuracy for 3 min typing duration to increase independence & efficiency with work requirements.  (Ongoing)       Start:  03/06/25       3/6/25: 10 wpm, 91% accuracy         Pt. will increase ease and independence with wiping bottom using dominant RUE as evidence by scoring task </= 2/7 on FTRF. (Ongoing)       Start:  03/06/25       3/6/25: 4/7             Sukumar Holt, OT

## 2025-04-14 NOTE — PROGRESS NOTES
"  Outpatient Rehab    Physical Therapy Visit    Patient Name: Peter Munoz  MRN: [id number]  YOB: 1955  Encounter Date: 4/14/2025    Therapy Diagnosis:   Encounter Diagnoses   Name Primary?    Alteration in performance of activities of daily living Yes    Alteration in instrumental activities of daily living (IADL)     Unspecified lack of coordination     Cerebrovascular accident (CVA) due to embolism of left middle cerebral artery     Impaired functional mobility, balance, gait, and endurance     Muscle weakness of right upper extremity        Physician: Racquel Lazcano MD    Physician Orders: Eval and Treat  Medical Diagnosis: Cerebrovascular accident (CVA) due to embolism of left middle cerebral artery    Visit # / Visits Authorized:  5 / 20  Insurance Authorization Period: 2/27/2025 to 12/31/2025  Date of Evaluation:  2/27/2025   Plan of Care Certification:  2/27/2025 to 4/24/2025 2/27/2025   PT/PTA:     Number of PTA visits since last PT visit:   Time In: 0930     Time Out:  1015  Total Time:  45   Total Billable Time:  45    FOTO:  Intake Score:  %  Survey Score 1:  %  Survey Score 2:  %         Subjective "Keeping up with home exercise program.I'm walking better, balancing better, back and legs are getting stronger."             Objective            Treatment:    Therapeutic Exercise  TE 1: 8 minutes of upright stationary bike Level 3  TE 2: 3 x 10 reps heel raises with 1# ankle weights NP  TE 3: 3 x 10 reps marches with 1# ankle weights NP  TE 4: 3 x 10 reps hamstring curls with 1# ankle weights NP  TE 5: 3 x 10 reps of horizontal  Leg Press dL 140#; 2x10 sL right 100#  Balance/Neuromuscular Re-Education  NMR 1: 2 x 30 seconds BLE leading split stance on sand dune with eyes closed, CGA  NMR 2: 4 laps of climbing up and over elevated mat table in quadruped and turns placing foot down alternately  NMR 3: 1x10 reps of opposite arm leg lift (bird dog)  NMR 4: 4 laps of fwd retro walking " holding bilateral cables with 12.5   NMR 5: 4 laps of lateral walking holding single cable w/12.5 + 10 chest press each lap  NMR 6: 2x10 of step up + cL led drivers -single sling pull down 10 lb cable   Therapeutic Activity  TA 1: 2 x 10 reps BLE leading step up to sand dune with opposite leg hike , CGA  TA 2: 2 x 10 reps sit to stands while holding tidal tank and standing on foam fitter  TA 3: 2 x 100 feet ambulation with horizontal head turns while holding tidal tank  TA 4: 2 x 100 feet ambulation with vertical head turns while holding tidal tank  TA 5: minimal  standing rest breaks less than 15 sec throughout session    Time Entry(in minutes):       Assessment & Plan   Assessment:  Peter tolerated today's session well. The patient would likely benefit from continued PT intervention to address remaining functional mobility deficits.           Patient will continue to benefit from skilled outpatient physical therapy to address the deficits listed in the problem list box on initial evaluation, provide pt/family education and to maximize pt's level of independence in the home and community environment.     Patient's spiritual, cultural, and educational needs considered and patient agreeable to plan of care and goals.           Plan: Continue plan of care      Goals:   Active       Short term goals        Patient to be independent with established home exercise program  (Progressing)       Start:  02/27/25    Expected End:  03/27/25            Patient to improve hip flexion MMT by 1/3  (Progressing)       Start:  02/27/25    Expected End:  03/27/25            Patient to improve right  knee flexion MMT by 1/3 (Progressing)       Start:  02/27/25    Expected End:  03/27/25            Patient to improve FGA score to 21/30 (Progressing)       Start:  02/27/25    Expected End:  03/27/25               long term goals        Patient to improve right hip flexion strength MMT score by 2/3  (Progressing)       Start:  02/27/25     Expected End:  04/24/25            Patient to improve FGA score to 25/30 (Progressing)       Start:  02/27/25    Expected End:  04/24/25            Patient to improve 6 MWT distance to 1450 feet  (Progressing)       Start:  02/27/25    Expected End:  04/24/25            patient to improve BLE SLS time to 5 seconds  (Progressing)       Start:  02/27/25    Expected End:  04/24/25                Peter Kim, PTA

## 2025-04-14 NOTE — PROGRESS NOTES
Outpatient Rehab    Occupational Therapy Visit    Patient Name: Peter Munoz  MRN: [id number]  YOB: 1955  Encounter Date: 4/9/2025    Therapy Diagnosis:   Encounter Diagnoses   Name Primary?    Alteration in performance of activities of daily living Yes    Alteration in instrumental activities of daily living (IADL)     Unspecified lack of coordination     Muscle weakness of right upper extremity      Physician: Racquel Lazcano MD    Physician Orders: Eval and Treat  Medical Diagnosis: Cerebrovascular accident (CVA) due to embolism of left middle cerebral artery    Visit # / Visits Authorized: 8 / 20  Insurance Authorization Period: 3/6/2025 to 12/31/2025  Date of Evaluation: 2/27/2025  Plan of Care Certification:  2/27/2025 to 5/13/2025        Time In: 1030   Time Out: 1115  Total Time: 45   Total Billable Time: 45                Treatment:  Therapeutic Activity  TA 1: Standing, VMI Task with 2D to 3D replication of pattern followed by completing VMI puzzle with problem solving, sequencing, & working memory components and additional R FM coordination component, mod v/c for forced use of dominant RUE, initially increased assistance for starting task with pt. able to continue without ongoing assistance, intermittent assistance for problem solving and attention to errors    Time Entry(in minutes):  Therapeutic Activity Time Entry: 45    Assessment & Plan   Assessment: Pt. required increased assistance to initiate problem solving/sequencing task, then reduced assistance required once assistance provided for initial steps and development of problem solving strategies. Intermittent cueing required for recall of directions and attention to errors. Increased frustration also exhibited with redirection to task and education on task purpose given pt's endorsement of executive functioning deficits.       Patient will continue to benefit from skilled outpatient occupational therapy to address the deficits  listed in the problem list box on initial evaluation, provide pt/family education and to maximize pt's level of independence in the home and community environment.     Patient's spiritual, cultural, and educational needs considered and patient agreeable to plan of care and goals.           Plan: OT plan to continue 2x/week to address RUE sensorimotor, proprioceptive/kinesthetic movement awareness, global joint stiffness/mm tightness, balance, fxnl ax tolerance, and executive functioning deficits to reduce risk of falls, further debility, and secondary musculoskeletal damage/impairments, increase safety, independence, and efficiency with ADLs, IADLs, work, and  roles/responsibilities, and improve overall QOL.    Goals:   Active       Long Term Goals       Pt. will increase AROM in all deficits areas of RUE. (Ongoing)       Start:  02/27/25 2/27/25: >/= 3/4 R shoulder extension, IR, supination, & wrist extension         Pt. will increase RUE MMT to 5/5 in all deficit areas for improved fxnl use of dominant RUE throughout daily occupational performance.  (Ongoing)       Start:  02/27/25 2/27/25: 4/5 RUE IR, ER, sh ext, sh flexion, supination, wrist ext         Pt. will be able to wipe bottom following toileting using dominant RUE. (Ongoing)       Start:  02/27/25 2/27/25: Not able to wipe with RUE         Pt. will demonstrate improved FM coordination as evidence by completing Grooved Peg Board in </= 2:45 mins. (Ongoing)       Start:  02/27/25 2/27/25: 3:23 mins         Pt. will improve handwriting legibility and maintain consistency of legibility to Fair+ for increased independence with legal, financial & medical mgmt. (Ongoing)       Start:  02/27/25 2/27/25: Poor, not consistent as writing progresses to R and down page              Long Term Goals (Cont'd)       Pt. will improve R side proprioception to >/= Fair+ for reduced risk of falls and improved fxnl use of dominant R  side throughout daily occupational performance. (Ongoing)       Start:  02/27/25 2/27/25: Fair-/Fair         Pt. will perform global stretching HEP Ind. (Ongoing)       Start:  02/27/25            Pt. will perform RUE strengthening HEP Ind. (Ongoing)       Start:  02/27/25               Long Term Goals (Cont'd)       Pt. will increase typing speed to >/= 22 wpm with >/=90% accuracy for 3 min typing duration to increase independence & efficiency with work requirements.  (Ongoing)       Start:  03/06/25       3/6/25: 10 wpm, 91% accuracy           Pt. will increase ease & efficiency donning clothing over RUE as evidence by rating task </= 1/7 on FTRF. (Ongoing)       Start:  03/06/25       3/6/25: 3/7         Pt. will increase efficiency & safety donning pants/shorts in standing as evidence by rating task </= 3/7 on FTRF. (Ongoing)       Start:  03/06/25       3/6/25: 4/7            Short Term Goals       Pt. will demonstrate improved FM coordination as evidence by completing Grooved Peg Board in </= 3:00 mins. (Ongoing)       Start:  02/27/25 2/27/25: 3:23 mins         Pt. will improve handwriting legibility and maintain consistency of legibility to Fair for increased independence with legal, financial & medical mgmt. (Ongoing)       Start:  02/27/25 2/27/25: Poor, not consistent as writing progresses to R and down page         OT will provide training/education on global stretching HEP to promote tissue lengthening necessary for improved postural control, fxnl mobility, & fxnl performance throughout ADLs/IADLs. (Ongoing)       Start:  02/27/25            TBA Functional Task Recording Form with accompanying goal(s) to follow as needed. (Met)       Start:  02/27/25    Resolved:  03/06/25    Opening containers/bottles, cutting with knife, putting R arm thru shirt/jacket sleeve, wiping bottom with RUE, phone/computer use         OT will provide training/education on RUE proximal strengthening/stability  HEP. (Ongoing)       Start:  02/27/25               Short Term Goals (Cont'd)       Pt. will increase typing speed to >/= 17 wpm with >/= 90% accuracy for 3 min typing duration to increase independence & efficiency with work requirements.  (Ongoing)       Start:  03/06/25       3/6/25: 10 wpm, 91% accuracy         Pt. will increase ease and independence with wiping bottom using dominant RUE as evidence by scoring task </= 2/7 on FTRF. (Ongoing)       Start:  03/06/25       3/6/25: 4/7             Sukumar Holt OT

## 2025-04-15 NOTE — PROGRESS NOTES
Outpatient Rehab    Physical Therapy Visit    Patient Name: Peter Munoz  MRN: [id number]  YOB: 1955  Encounter Date: 4/16/2025    Therapy Diagnosis:   Encounter Diagnosis   Name Primary?    Impaired functional mobility, balance, gait, and endurance Yes     Physician: Racquel Lazcano MD    Physician Orders: Eval and Treat  Medical Diagnosis: Cerebrovascular accident (CVA) due to embolism of left middle cerebral artery    Visit # / Visits Authorized:  6 / 20  Insurance Authorization Period: 2/27/2025 to 12/31/2025  Date of Evaluation:  2/27/2025   Plan of Care Certification:  2/27/2025 to 4/24/2025                     PT/PTA:     Number of PTA visits since last PT visit:   Time In: 1115   Time Out: 1200  Total Time: 45   Total Billable Time:  45 minutes     FOTO:  Intake Score:  %  Survey Score 1:  %  Survey Score 2:  %         Subjective   feeling good but its been a rough two weeks.  Pain reported as 0/10. n/a    Objective            Treatment:  Balance/Neuromuscular Re-Education  NMR 1: 4 laps tandem ambulation, SBA  NMR 2: 4 laps marching with three second holds, CGA  NMR 3: 2 x 30 seconds split stance on sand dune, eyes closed, SBA  Therapeutic Activity  TA 2: 10 reps BLE leading step ups to sand dune  TA 3: 3 x 10 reps sit to stands while holding tidal tank and standing on foam fitter  TA 4: 2 x 100 feet ambulation with vertical head turns while holding tidal tank  TA 5: multiple seated rest breaks throughout session  TA 6: 2 x 100 feet ambulation with horizontal head turns while holding tidal tank  TA 7: 2 x 100 feet forward/backwards ambulation with 4.4# ball toss to/from tech    Time Entry(in minutes):  Neuromuscular Re-Education Time Entry: 10  Therapeutic Activity Time Entry: 25  Therapeutic Exercise Time Entry: 10    Assessment & Plan   Assessment: Peter tolerated today's session fairly well this morning. The patient continues to struggle with duel task activities and dynamic balance.  Multiple seated rest breaks required throughout session due to fatigue.  The patient would likely benefit from continued PT intervention to address remaining functional mobility deficits.  Evaluation/Treatment Tolerance: Patient tolerated treatment well    Patient will continue to benefit from skilled outpatient physical therapy to address the deficits listed in the problem list box on initial evaluation, provide pt/family education and to maximize pt's level of independence in the home and community environment.     Patient's spiritual, cultural, and educational needs considered and patient agreeable to plan of care and goals.           Plan: focus on dynamic balance, strength and endurance training as tolerated    Goals:   Active       Short term goals        Patient to be independent with established home exercise program  (Progressing)       Start:  02/27/25    Expected End:  03/27/25            Patient to improve hip flexion MMT by 1/3  (Progressing)       Start:  02/27/25    Expected End:  03/27/25            Patient to improve right  knee flexion MMT by 1/3 (Progressing)       Start:  02/27/25    Expected End:  03/27/25            Patient to improve FGA score to 21/30 (Progressing)       Start:  02/27/25    Expected End:  03/27/25               long term goals        Patient to improve right hip flexion strength MMT score by 2/3  (Progressing)       Start:  02/27/25    Expected End:  04/24/25            Patient to improve FGA score to 25/30 (Progressing)       Start:  02/27/25    Expected End:  04/24/25            Patient to improve 6 MWT distance to 1450 feet  (Progressing)       Start:  02/27/25    Expected End:  04/24/25            patient to improve BLE SLS time to 5 seconds  (Progressing)       Start:  02/27/25    Expected End:  04/24/25                Josette Murphy, PT

## 2025-04-16 ENCOUNTER — CLINICAL SUPPORT (OUTPATIENT)
Dept: REHABILITATION | Facility: HOSPITAL | Age: 70
End: 2025-04-16
Payer: MEDICARE

## 2025-04-16 DIAGNOSIS — R27.9 UNSPECIFIED LACK OF COORDINATION: ICD-10-CM

## 2025-04-16 DIAGNOSIS — Z78.9 ALTERATION IN PERFORMANCE OF ACTIVITIES OF DAILY LIVING: Primary | ICD-10-CM

## 2025-04-16 DIAGNOSIS — Z78.9 ALTERATION IN INSTRUMENTAL ACTIVITIES OF DAILY LIVING (IADL): ICD-10-CM

## 2025-04-16 DIAGNOSIS — M62.81 MUSCLE WEAKNESS OF RIGHT UPPER EXTREMITY: ICD-10-CM

## 2025-04-16 DIAGNOSIS — Z74.09 IMPAIRED FUNCTIONAL MOBILITY, BALANCE, GAIT, AND ENDURANCE: Primary | ICD-10-CM

## 2025-04-16 PROCEDURE — 97112 NEUROMUSCULAR REEDUCATION: CPT | Mod: PO

## 2025-04-16 PROCEDURE — 97530 THERAPEUTIC ACTIVITIES: CPT | Mod: PO

## 2025-04-16 PROCEDURE — 97110 THERAPEUTIC EXERCISES: CPT | Mod: PO

## 2025-04-21 NOTE — PROGRESS NOTES
Outpatient Rehab    Physical Therapy Visit    Patient Name: Peter Munoz  MRN: [id number]  YOB: 1955  Encounter Date: 4/22/2025    Therapy Diagnosis:   Encounter Diagnosis   Name Primary?    Impaired functional mobility, balance, gait, and endurance Yes     Physician: Racquel Lazcano MD    Physician Orders: Eval and Treat  Medical Diagnosis: Cerebrovascular accident (CVA) due to embolism of left middle cerebral artery    Visit # / Visits Authorized:  7 / 20  Insurance Authorization Period: 2/27/2025 to 12/31/2025  Date of Evaluation:  2/27/2025   Plan of Care Certification:  2/27/2025 to 4/24/2025         PT/PTA:     Number of PTA visits since last PT visit:   Time In: 0800   Time Out: 0845  Total Time: 45   Total Billable Time:  45 minutes     FOTO:  Intake Score:  %  Survey Score 1:  %  Survey Score 2:  %         Subjective   doing fine, still waking up.  Pain reported as 0/10.      Objective            Treatment:  Therapeutic Exercise  TE 1: 10 minutes on SCIFIT seated elliptical at level 6.0  Balance/Neuromuscular Re-Education  NMR 1: 6 laps tandem ambulation, SBA  NMR 3: 2 x 30 seconds split stance on hard side of BOSU, eyes open, SBA  NMR 4: 30 seconds static standing on hard side of BOSU, eyes open, CGA  NMR 5: 30 seconds static standing on hard side of BOSU, eyes closed, modA  NMR 6: Squats over chair 2x10  Therapeutic Activity  TA 2: 10 reps BLE leading step ups to sand dune  TA 3: 3 x 10 reps sit to stands while holding tidal tank and standing on foam fitter  TA 4: 2 x 100 feet ambulation with vertical head turns while holding tidal tank  TA 5: multiple seated rest breaks throughout session  TA 6: 2 x 100 feet ambulation with horizontal head turns while holding tidal tank    Time Entry(in minutes):  Neuromuscular Re-Education Time Entry: 20  Therapeutic Activity Time Entry: 15  Therapeutic Exercise Time Entry: 10    Assessment & Plan   Assessment: Peter tolerated session well this morning.  Increased UE support required with eyes closed balance conditions. The patient requires occ seated rest breaks throughout session.The patient would likely benefit from continued PT intervention to address remaining functional mobility deficits.  Evaluation/Treatment Tolerance: Patient tolerated treatment well    Patient will continue to benefit from skilled outpatient physical therapy to address the deficits listed in the problem list box on initial evaluation, provide pt/family education and to maximize pt's level of independence in the home and community environment.     Patient's spiritual, cultural, and educational needs considered and patient agreeable to plan of care and goals.           Plan: focus on dynamic balance, strength and endurance training as tolerated    Goals:   Active       Short term goals        Patient to be independent with established home exercise program  (Progressing)       Start:  02/27/25    Expected End:  03/27/25            Patient to improve hip flexion MMT by 1/3  (Progressing)       Start:  02/27/25    Expected End:  03/27/25            Patient to improve right  knee flexion MMT by 1/3 (Progressing)       Start:  02/27/25    Expected End:  03/27/25            Patient to improve FGA score to 21/30 (Progressing)       Start:  02/27/25    Expected End:  03/27/25               long term goals        Patient to improve right hip flexion strength MMT score by 2/3  (Progressing)       Start:  02/27/25    Expected End:  04/24/25            Patient to improve FGA score to 25/30 (Progressing)       Start:  02/27/25    Expected End:  04/24/25            Patient to improve 6 MWT distance to 1450 feet  (Progressing)       Start:  02/27/25    Expected End:  04/24/25            patient to improve BLE SLS time to 5 seconds  (Progressing)       Start:  02/27/25    Expected End:  04/24/25                Josette Murphy, PT

## 2025-04-22 ENCOUNTER — CLINICAL SUPPORT (OUTPATIENT)
Dept: REHABILITATION | Facility: HOSPITAL | Age: 70
End: 2025-04-22
Payer: MEDICARE

## 2025-04-22 DIAGNOSIS — Z74.09 IMPAIRED FUNCTIONAL MOBILITY, BALANCE, GAIT, AND ENDURANCE: Primary | ICD-10-CM

## 2025-04-22 DIAGNOSIS — Z78.9 ALTERATION IN INSTRUMENTAL ACTIVITIES OF DAILY LIVING (IADL): ICD-10-CM

## 2025-04-22 DIAGNOSIS — Z78.9 ALTERATION IN PERFORMANCE OF ACTIVITIES OF DAILY LIVING: Primary | ICD-10-CM

## 2025-04-22 DIAGNOSIS — R27.9 UNSPECIFIED LACK OF COORDINATION: ICD-10-CM

## 2025-04-22 DIAGNOSIS — M62.81 MUSCLE WEAKNESS OF RIGHT UPPER EXTREMITY: ICD-10-CM

## 2025-04-22 PROCEDURE — 97110 THERAPEUTIC EXERCISES: CPT | Mod: PO

## 2025-04-22 PROCEDURE — 97530 THERAPEUTIC ACTIVITIES: CPT | Mod: PO

## 2025-04-22 PROCEDURE — 97112 NEUROMUSCULAR REEDUCATION: CPT | Mod: PO

## 2025-04-23 ENCOUNTER — HOSPITAL ENCOUNTER (OUTPATIENT)
Dept: CARDIOLOGY | Facility: CLINIC | Age: 70
Discharge: HOME | End: 2025-04-23
Payer: MEDICARE

## 2025-04-23 ENCOUNTER — OFFICE VISIT (OUTPATIENT)
Dept: ELECTROPHYSIOLOGY | Facility: CLINIC | Age: 70
End: 2025-04-23
Payer: MEDICARE

## 2025-04-23 ENCOUNTER — CLINICAL SUPPORT (OUTPATIENT)
Dept: CARDIOLOGY | Facility: HOSPITAL | Age: 70
End: 2025-04-23
Attending: INTERNAL MEDICINE
Payer: MEDICARE

## 2025-04-23 VITALS
HEIGHT: 67 IN | BODY MASS INDEX: 31.21 KG/M2 | SYSTOLIC BLOOD PRESSURE: 146 MMHG | DIASTOLIC BLOOD PRESSURE: 89 MMHG | HEART RATE: 66 BPM | WEIGHT: 198.88 LBS

## 2025-04-23 DIAGNOSIS — I77.1 STRICTURE OF ARTERY: ICD-10-CM

## 2025-04-23 DIAGNOSIS — Z86.73 HISTORY OF CVA (CEREBROVASCULAR ACCIDENT): ICD-10-CM

## 2025-04-23 DIAGNOSIS — I10 PRIMARY HYPERTENSION: ICD-10-CM

## 2025-04-23 DIAGNOSIS — I42.2 HYPERTROPHIC CARDIOMYOPATHY: ICD-10-CM

## 2025-04-23 DIAGNOSIS — Z95.810 ICD (IMPLANTABLE CARDIOVERTER-DEFIBRILLATOR) IN PLACE: ICD-10-CM

## 2025-04-23 DIAGNOSIS — Z95.810 ICD (IMPLANTABLE CARDIOVERTER-DEFIBRILLATOR) IN PLACE: Primary | ICD-10-CM

## 2025-04-23 LAB
OHS QRS DURATION: 84 MS
OHS QTC CALCULATION: 465 MS

## 2025-04-23 PROCEDURE — 93282 PRGRMG EVAL IMPLANTABLE DFB: CPT

## 2025-04-23 PROCEDURE — 99214 OFFICE O/P EST MOD 30 MIN: CPT | Mod: PBBFAC,25

## 2025-04-23 PROCEDURE — 93005 ELECTROCARDIOGRAM TRACING: CPT | Mod: PBBFAC | Performed by: INTERNAL MEDICINE

## 2025-04-23 PROCEDURE — 93010 ELECTROCARDIOGRAM REPORT: CPT | Mod: S$PBB,,, | Performed by: INTERNAL MEDICINE

## 2025-04-23 PROCEDURE — 99999 PR PBB SHADOW E&M-EST. PATIENT-LVL IV: CPT | Mod: PBBFAC,,,

## 2025-04-23 NOTE — PROGRESS NOTES
"  Outpatient Rehab    Occupational Therapy Visit    Patient Name: Antoine Munoz  MRN: [id number]  YOB: 1955  Encounter Date: 4/22/2025    Therapy Diagnosis:   Encounter Diagnoses   Name Primary?    Alteration in performance of activities of daily living Yes    Alteration in instrumental activities of daily living (IADL)     Unspecified lack of coordination     Muscle weakness of right upper extremity      Physician: Racquel Lazcano MD    Physician Orders: Eval and Treat  Medical Diagnosis: Cerebrovascular accident (CVA) due to embolism of left middle cerebral artery    Visit # / Visits Authorized: 11 / 20  Insurance Authorization Period: 3/6/2025 to 12/31/2025  Date of Evaluation: 2/27/2025 ; Progress Note Completed 4/16/25  Plan of Care Certification:  2/27/2025 to 5/13/2025        Time In: 0850   Time Out: 0930  Total Time: 40   Total Billable Time: 40           Subjective   "I can't think right now." "I'm fine with having someone else do the tip or asking the  to just include the tip and add it up. I want to work on my printing and keeping the size of my print big if I'm writing letters or filling out forms.".             Treatment:  Therapeutic Exercise  TE 1: UE bike at 2.0 intensity level for 10 min duration, switching cycling direction at 5 minute antoine  Therapeutic Activity  TA 1: Seated at table top, in hand manipulation with intrinsc strength component to remove x4 toothpicks from pink theraputty finger<>palm translation with stabilization, then shift and rotate to place individual toothpicks thru small holes of toothpick ball, x4 sets  TA 2: Handwriting practice for simulated dining check, pt. required to calcuate total cost with tax, calculate tip and calculate final total cost, attempted to complete with divided attention component to listen to verbal story and answer questions verbally, unable to divide attention despite x3 attempts, mod errors with calculation, Fair+ handwriting " legibility    Time Entry(in minutes):  Therapeutic Activity Time Entry: 30  Therapeutic Exercise Time Entry: 10    Assessment & Plan   Assessment: Pt. demo'd max difficulty with immediate recall/divided attention component while completing simulated money mgmt task, requiring discontinuation of divided attention component. Mod errors with money mgmt calculations, however pt. reported check management is no longer a goal/priority of his, rather he prefers to continue to focus on improving and maintaining handwriting legibility with printing correspondence, with OT discussing plan to continue to address amplitude, spacing, and speed aspects of handwriting.       Patient will continue to benefit from skilled outpatient occupational therapy to address the deficits listed in the problem list box on initial evaluation, provide pt/family education and to maximize pt's level of independence in the home and community environment.     Patient's spiritual, cultural, and educational needs considered and patient agreeable to plan of care and goals.           Plan: OT plan to continue 2x/week to address RUE sensorimotor, proprioceptive/kinesthetic movement awareness, global joint stiffness/mm tightness, balance, fxnl ax tolerance, and executive functioning deficits to reduce risk of falls, further debility, and secondary musculoskeletal damage/impairments, increase safety, independence, and efficiency with ADLs, IADLs, work, and  roles/responsibilities.    Goals:   Active       Long Term Goals       Pt. will increase AROM in all deficits areas of RUE. (Ongoing)       Start:  02/27/25 2/27/25: >/= 3/4 R shoulder extension, IR, supination, & wrist extension  4/16/25: >3/4 IR with palms touching lower back, supination, and wrist extension          Pt. will increase RUE MMT to 5/5 in all deficit areas for improved fxnl use of dominant RUE throughout daily occupational performance.  (Ongoing)       Start:  02/27/25        2/27/25: 4/5 RUE IR, ER, sh ext, sh flexion, supination, wrist ext  4/16/25: 4/5 - 5/5         Pt. will be able to wipe bottom following toileting using dominant RUE. (Ongoing)       Start:  02/27/25 2/27/25: Not able to wipe with RUE  4/16/25: Not able to wipe with RUE         Pt. will demonstrate improved FM coordination as evidence by completing Grooved Peg Board in </= 2:45 mins. (Met)       Start:  02/27/25    Resolved:  04/16/25 2/27/25: 3:23 mins  4/16/25: 2:14 mins         Pt. will improve handwriting legibility and maintain consistency of legibility to Fair+ for increased independence with legal, financial & medical mgmt. (Ongoing)       Start:  02/27/25 2/27/25: Poor, not consistent as writing progresses to R and down page  4/16/25: Fair            Long Term Goals (Cont'd)       Pt. will improve R side proprioception to >/= Fair+ for reduced risk of falls and improved fxnl use of dominant R side throughout daily occupational performance. (Ongoing)       Start:  02/27/25 2/27/25: Fair-/Fair  4/16/25: Fair         Pt. will perform global stretching HEP Ind. (Ongoing)       Start:  02/27/25 4/16/25: Initiated         Pt. will perform RUE strengthening HEP Ind. (Ongoing)       Start:  02/27/25               Long Term Goals (Cont'd)       Pt. will increase typing speed to >/= 22 wpm with >/=90% accuracy for 3 min typing duration to increase independence & efficiency with work requirements.  (Ongoing)       Start:  03/06/25       3/6/25: 10 wpm, 91% accuracy  4/16/25: NT         Pt. will increase ease & efficiency donning clothing over RUE as evidence by rating task </= 1/7 on FTRF. (Ongoing)       Start:  03/06/25       3/6/25: 3/7  4/16/25: 3/7         Pt. will increase efficiency & safety donning pants/shorts in standing as evidence by rating task </= 3/7 on FTRF. (Met)       Start:  03/06/25    Resolved:  04/16/25    3/6/25: 4/7 4/16/25: 3/7            Short Term Goals        Pt. will demonstrate improved FM coordination as evidence by completing Grooved Peg Board in </= 3:00 mins. (Met)       Start:  02/27/25    Resolved:  04/16/25 2/27/25: 3:23 mins  4/16/25: 2:14 mins         Pt. will improve handwriting legibility and maintain consistency of legibility to Fair for increased independence with legal, financial & medical mgmt. (Met)       Start:  02/27/25    Resolved:  04/16/25 2/27/25: Poor, not consistent as writing progresses to R and down page  4/16/25: Fair         OT will provide training/education on global stretching HEP to promote tissue lengthening necessary for improved postural control, fxnl mobility, & fxnl performance throughout ADLs/IADLs. (Ongoing)       Start:  02/27/25       Initiated          TBA Functional Task Recording Form with accompanying goal(s) to follow as needed. (Met)       Start:  02/27/25    Resolved:  03/06/25    Opening containers/bottles, cutting with knife, putting R arm thru shirt/jacket sleeve, wiping bottom with RUE, phone/computer use         OT will provide training/education on RUE proximal strengthening/stability HEP. (Ongoing)       Start:  02/27/25               Short Term Goals (Cont'd)       Pt. will increase typing speed to >/= 17 wpm with >/= 90% accuracy for 3 min typing duration to increase independence & efficiency with work requirements.  (Ongoing)       Start:  03/06/25       3/6/25: 10 wpm, 91% accuracy  4/16/25: NT         Pt. will increase ease and independence with wiping bottom using dominant RUE as evidence by scoring task </= 2/7 on FTRF. (Ongoing)       Start:  03/06/25       3/6/25: 4/7 4/16/25: 5/7             Sukumar Holt, OT

## 2025-04-23 NOTE — PROGRESS NOTES
"  Outpatient Rehab    Occupational Therapy Progress Note    Patient Name: Peter Munoz  MRN: [id number]  YOB: 1955  Encounter Date: 4/16/2025    Therapy Diagnosis:   Encounter Diagnoses   Name Primary?    Alteration in performance of activities of daily living Yes    Alteration in instrumental activities of daily living (IADL)     Unspecified lack of coordination     Muscle weakness of right upper extremity      Physician: Racquel Lazcano MD    Physician Orders: Eval and Treat  Medical Diagnosis: Cerebrovascular accident (CVA) due to embolism of left middle cerebral artery    Visit # / Visits Authorized: 11 / 20  Insurance Authorization Period: 3/6/2025 to 12/31/2025  Date of Evaluation: 2/27/2025  Plan of Care Certification:  2/27/2025 to 5/13/2025        Time In: 1033   Time Out: 1115  Total Time: 42   Total Billable Time: 42             Subjective   "I'm alot more disabled than I realized.".         Objective      RUE AROM: >3/4 IR with palms touching lower back, supination, and wrist extension   RUE MMT: 4/5 - 5/5  Grooved Peg Board (R hand): 2:14 mins       Treatment:  Therapeutic Activity  TA 1: OT and pt. discussed pt's current fxnl performance within the home/community, progress/barriers to progress, and goals assessed for OT progress note. OT highlighted improvements in all areas addressed during this reporting period, with encouragement and discussion re: improved awareness of performance skill deficits at this time vs decline or increased difficulty performing fxnl tasks compared to perceptions/awareness at time of initial evaluation. Discussed plan to continue to address performance skill deficits thru NMR, FM coordination, stretching, strengthening, and HEP training in conjunction with fxnl task practice to improve fxnl use of RUE and increase safety, independence and efficiency with all daily occupations.  TA 2: Functional task recording form completed: cutting with knife 4/7 (chopping, " needs to be more careful when chopping and cutting with knife); shoe laces 3/7; bathroom 5/7 (using dominant RUE); handwriting 5/7 (has to go slow and put forth effort); texting 4/7 (slower and less accurate with pecking keys); dressing pants 3/7 (Standing)  TA 3: Handwriting practice on lined paper with pt. writing recipe from memory, appropriate letter size maintained with Fair/+ legibility, decreased spacing between words, mod errors  Balance/Neuromuscular Re-Education  NMR 1: Seated, bimanual integration task to walk yoga ball forward incrementally from hand to hand to achieve available shoulder flexion/elbow extension AROM, sustained trunk flexion while holding positioning for promotion of global tissue lengthening, alternated R/L hand to walk ball back towards body to return to upright starting position.  NMR 2: Standing, bilateral reaching to separate x2 suctioned Squigz behind back>squat>unilateral weight shift and reach over head to place Squigz on window>squat>weight shift and reach on other side, several reps each with focus on interlimb coordination, amplitude, sequencing, in hand manipulation, and global tissue lengthening, min v/c for proper sequencing and amplitude/effort on R    Time Entry(in minutes):  Neuromuscular Re-Education Time Entry: 12  Therapeutic Activity Time Entry: 30    Assessment & Plan   Assessment: Mr. Munoz has made good progress with OT during this 10 visit reporting period, meeting 2 STGs and 2 LTGs for improved R FM coordination, handwriting legibility, and independence/ease with LB dressing in standing. Mr. Munoz demonstrates gains in RUE proprioceptive awareness, AROM, and fxnl ax tolerance during this time. Pt. has responded well to and benefited from skilled tx interventions with neuromuscular re-education, tissue lengthening, proprioceptive/kinesthetic movement awareness, and FM skills training to address RUE sensorimotor, mm tightness/joint stiffness, and motor  planning deficits limiting fxnl use of dominant RUE. OT has been able to progress tx interventions thru incorporation of dynamic surfaces, multi-sequence, visuospatial/visual perceptual, and/or divided attention components as pt. demonstrates improvements in performance skills/patterns. Mr. Munoz also demonstrates improved awareness of presenting deficits at this time, whereas pt. demonstrated decreased insight to RUE deficits and was limiting use of RUE throughout daily occupational performance at time of initial evaluation. Despite the progress made during this time, Mr. Munoz continues to exhibit ongoing motor planning, proprioception, motor control, coordination, global tissue length imbalances, divided attention, memory, problem solving, balance, and fxnl ax tolerance deficits, all of which continue to decrease his safety, independence and efficiency with all daily occupations and pose him at risk for further debility and/or secondary injuries. Mr. Munoz reports ongoing difficulty with RUE motor control and coordination, stating he continues to have to actively think more when performing fxnl tasks utilizing dominant R hand, and overall increased time and effort required for distal motor tasks such as clothing fastener manipulation, writing, cutting, computer/cell phone use, etc.. He also continues to remain limited in ability to utilize dominant RUE to wipe bottom during toileting 2/2 AROM and coordination deficits. OT discussed plan to continue to address performance skills and sensorimotor deficits, as well as incorporate functional task practice including cutting, typing, handwriting, dressing, manipulating fasteners, multidirectional reaching (especially posterior) for bathing, dressing, toileting, etc.. in order to improve fxnl use of RUE and increase independence and safety with all ADLs, IADLs, , and work related tasks.       Patient will continue to benefit from skilled outpatient  occupational therapy to address the deficits listed in the problem list box on initial evaluation, provide pt/family education and to maximize pt's level of independence in the home and community environment.     Patient's spiritual, cultural, and educational needs considered and patient agreeable to plan of care and goals.           Plan: OT plan to continue 2x/week to address RUE sensorimotor, proprioceptive/kinesthetic movement awareness, global joint stiffness/mm tightness, balance, fxnl ax tolerance, and executive functioning deficits to reduce risk of falls, further debility, and secondary musculoskeletal damage/impairments, increase safety, independence, and efficiency with ADLs, IADLs, work, and  roles/responsibilities.    Goals:   Active       Long Term Goals       Pt. will increase AROM in all deficits areas of RUE. (Progressing)       Start:  02/27/25 2/27/25: >/= 3/4 R shoulder extension, IR, supination, & wrist extension  4/16/25: >3/4 IR with palms touching lower back, supination, and wrist extension          Pt. will increase RUE MMT to 5/5 in all deficit areas for improved fxnl use of dominant RUE throughout daily occupational performance.  (Ongoing)       Start:  02/27/25 2/27/25: 4/5 RUE IR, ER, sh ext, sh flexion, supination, wrist ext  4/16/25: 4/5 - 5/5         Pt. will be able to wipe bottom following toileting using dominant RUE. (Ongoing)       Start:  02/27/25 2/27/25: Not able to wipe with RUE  4/16/25: Not able to wipe with RUE         Pt. will demonstrate improved FM coordination as evidence by completing Grooved Peg Board in </= 2:45 mins. (Met)       Start:  02/27/25    Resolved:  04/16/25 2/27/25: 3:23 mins  4/16/25: 2:14 mins         Pt. will improve handwriting legibility and maintain consistency of legibility to Fair+ for increased independence with legal, financial & medical mgmt. (Progressing)       Start:  02/27/25 2/27/25: Poor, not  consistent as writing progresses to R and down page  4/16/25: Fair            Long Term Goals (Cont'd)       Pt. will improve R side proprioception to >/= Fair+ for reduced risk of falls and improved fxnl use of dominant R side throughout daily occupational performance. (Progressing)       Start:  02/27/25 2/27/25: Fair-/Fair  4/16/25: Fair         Pt. will perform global stretching HEP Ind. (Ongoing)       Start:  02/27/25 4/16/25: Initiated         Pt. will perform RUE strengthening HEP Ind. (Ongoing)       Start:  02/27/25               Long Term Goals (Cont'd)       Pt. will increase typing speed to >/= 22 wpm with >/=90% accuracy for 3 min typing duration to increase independence & efficiency with work requirements.  (Ongoing)       Start:  03/06/25       3/6/25: 10 wpm, 91% accuracy  4/16/25: NT         Pt. will increase ease & efficiency donning clothing over RUE as evidence by rating task </= 1/7 on FTRF. (Ongoing)       Start:  03/06/25       3/6/25: 3/7  4/16/25: 3/7         Pt. will increase efficiency & safety donning pants/shorts in standing as evidence by rating task </= 3/7 on FTRF. (Met)       Start:  03/06/25    Resolved:  04/16/25    3/6/25: 4/7  4/16/25: 3/7            Short Term Goals       Pt. will demonstrate improved FM coordination as evidence by completing Grooved Peg Board in </= 3:00 mins. (Met)       Start:  02/27/25    Resolved:  04/16/25 2/27/25: 3:23 mins  4/16/25: 2:14 mins         Pt. will improve handwriting legibility and maintain consistency of legibility to Fair for increased independence with legal, financial & medical mgmt. (Met)       Start:  02/27/25    Resolved:  04/16/25 2/27/25: Poor, not consistent as writing progresses to R and down page  4/16/25: Fair         OT will provide training/education on global stretching HEP to promote tissue lengthening necessary for improved postural control, fxnl mobility, & fxnl performance throughout ADLs/IADLs.  (Ongoing)       Start:  02/27/25       Initiated          TBA Functional Task Recording Form with accompanying goal(s) to follow as needed. (Met)       Start:  02/27/25    Resolved:  03/06/25    Opening containers/bottles, cutting with knife, putting R arm thru shirt/jacket sleeve, wiping bottom with RUE, phone/computer use         OT will provide training/education on RUE proximal strengthening/stability HEP. (Ongoing)       Start:  02/27/25               Short Term Goals (Cont'd)       Pt. will increase typing speed to >/= 17 wpm with >/= 90% accuracy for 3 min typing duration to increase independence & efficiency with work requirements.  (Ongoing)       Start:  03/06/25       3/6/25: 10 wpm, 91% accuracy  4/16/25: NT         Pt. will increase ease and independence with wiping bottom using dominant RUE as evidence by scoring task </= 2/7 on FTRF. (Ongoing)       Start:  03/06/25       3/6/25: 4/7  4/16/25: 5/7             Sukumar Holt, OT

## 2025-04-25 ENCOUNTER — CLINICAL SUPPORT (OUTPATIENT)
Dept: REHABILITATION | Facility: HOSPITAL | Age: 70
End: 2025-04-25
Payer: MEDICARE

## 2025-04-25 DIAGNOSIS — R27.9 UNSPECIFIED LACK OF COORDINATION: ICD-10-CM

## 2025-04-25 DIAGNOSIS — Z78.9 ALTERATION IN INSTRUMENTAL ACTIVITIES OF DAILY LIVING (IADL): ICD-10-CM

## 2025-04-25 DIAGNOSIS — Z74.09 IMPAIRED FUNCTIONAL MOBILITY, BALANCE, GAIT, AND ENDURANCE: Primary | ICD-10-CM

## 2025-04-25 DIAGNOSIS — M62.81 MUSCLE WEAKNESS OF RIGHT UPPER EXTREMITY: ICD-10-CM

## 2025-04-25 DIAGNOSIS — Z78.9 ALTERATION IN PERFORMANCE OF ACTIVITIES OF DAILY LIVING: Primary | ICD-10-CM

## 2025-04-25 PROCEDURE — 97530 THERAPEUTIC ACTIVITIES: CPT | Mod: PO

## 2025-04-25 PROCEDURE — 97110 THERAPEUTIC EXERCISES: CPT | Mod: PO

## 2025-04-25 NOTE — PROGRESS NOTES
]  Outpatient Rehab    Physical Therapy Progress Note : Updated Plan of Care    Patient Name: Peter Munoz  MRN: [id number]  YOB: 1955  Encounter Date: 4/25/2025    Therapy Diagnosis:   Encounter Diagnosis   Name Primary?    Impaired functional mobility, balance, gait, and endurance Yes     Physician: Racquel Lazcano MD    Physician Orders: Eval and Treat  Medical Diagnosis: Cerebrovascular accident (CVA) due to embolism of left middle cerebral artery    Visit # / Visits Authorized:  8 / 20  Insurance Authorization Period: 2/27/2025 to 12/31/2025  Date of Evaluation:  2/27/2025   Plan of Care Certification:  2/27/2025 to 4/24/2025     PT/PTA:     Number of PTA visits since last PT visit:   Time In: 0850   Time Out: 0930  Total Time: 40   Total Billable Time:  40 minutes     FOTO:  Intake Score:  %  Survey Score 1:  %  Survey Score 2:  %         Subjective   feels like he has made significant improvements with therapy and would like to continue if possible.  Pain reported as 0/10. NA    Objective            RANGE OF MOTION--LOWER EXTREMITIES  (R) LE Hip: normal              Knee: normal              Ankle: normal     (L) LE: Hip: normal              Knee: normal              Ankle: normal     Strength: manual muscle test grades below   Lower Extremity Strength  Right LE  eval  4/25/2025 Left LE  eval  4/25/2025   Hip Flexion: 4-/5 4+/5 Hip Flexion: 4-/5 4+/5   Hip Extension:  4/5 4+/5 Hip Extension: 4+/5 4+/5   Hip Abduction: 5/5 5/5 Hip Abduction: 5/5 5/5   Hip Adduction: 4/5 4+/5 Hip Adduction 4+/5 4+/5   Knee Extension: 5/5 5/5 Knee Extension: 5/5 5/5   Knee Flexion: 4/5 5/5 Knee Flexion: 4+/5 5/5   Ankle Dorsiflexion: 5/5 5/5 Ankle Dorsiflexion: 5/5 5/5   Ankle Plantarflexion: 5/5 5/5 Ankle Plantarflexion: 5/5 5/5          Evaluation 4/25/2025   Single Limb Stance R LE 1 second  (<10 sec = HIGH FALL RISK) 4 seconds    Single Limb Stance L LE 1 second  (<10 sec = HIGH FALL RISK) 9 seconds          Evaluation 4/25/2025   30 second Chair Rise  (adults > 59 y/o) 10 completed with no arms 16 completed with no arms    5 times sit-stand  (adults 18-65 y/o) 15 seconds with no UE support   >12 sec= fall risk for general elderly  >16 sec= fall risk for Parkinson's disease  >10 sec= balance/vestibular dysfunction (<59 y/o)  >14.2 sec= balance/vestibular dysfunction (>59 y/o)  >12 sec= fall risk for CVA 10 seconds                Evaluation 4/25/2025   6 MWT  1250 feet  1437   Self Selected Walking Speed 0.86 m/sec (6m/7s) 1.0 m/sec (6m/6s)   Fast Walking Speed 1.0 m/sec (6m/6s) 1.4 m/sec (6m/4s)          Functional Gait Assessment:   1. Gait on level surface =  3  2. Change in Gait Speed = 3  3. Gait with horizontal head turns  = 2  4. Gait with vertical head turns = 3  5. Gait with pivot turns = 2  6. Step over obstacle = 3  7. Gait with Narrow JOHN = 1  8. Gait with eyes closed = 2  9. Ambulating Backwards = 2  10. Steps = 2      Score 23/30   Score:   <22/30 fall risk   <20/30 fall risk in older adults   <18/30 fall risk in Parkinsons     Treatment:  Therapeutic Exercise  TE 1: 10 minutes ambulation on treadmill at 1.8 MPH with BUE support  Therapeutic Activity  TA 1: objective measurements and FOTO  TA 2: discussion of the patient's POC and improvements with overall mobility    Time Entry(in minutes):  Therapeutic Activity Time Entry: 30  Therapeutic Exercise Time Entry: 10    Assessment & Plan   Assessment  Peter                 Assessment Details: Peter tolerated today's reassessment POC session very well this morning. Significant improvements noted with Peter's BLE strength, endurance and balance based on objective scores. Peter demonstrates good BLE strength with minimal remaining strength deficits in hip extension and hip flexion strength scores. Significant improvements noted with Peter's FGA score, placing him outside of  the elevated falls risk category. Peter's SSWS places him in the community ambulator with  increased time needs category. Peter's SLS time also continues to place him in the elevated falls risk category. The patient showed significant improvements with his 6 MWT score, indicating improved CV endurance and LE strength. Peter would likely benefit from continued PT intervention with emphasis placed on duel task activities and high level dynamic balance.     Plan  From a physical therapy perspective, the patient would benefit from: Skilled Rehab Services    Planned therapy interventions include: Therapeutic exercise, Therapeutic activities, Neuromuscular re-education, Cognitive functional training, Community/work reintegration, Gait training, Wheelchair management, Prosthetic management and training, Work conditioning, Work hardening, and Orthotic management and training.            Visit Frequency: 2 times Per Week for 8 Weeks.       This plan was discussed with Patient.   Discussion participants: Agreed Upon Plan of Care  Plan details: Duel task activities, treadmill endurance training, high level dynamic balance and activity tolerance           Patient will continue to benefit from skilled outpatient physical therapy to address the deficits listed in the problem list box on initial evaluation, provide pt/family education and to maximize pt's level of independence in the home and community environment.     Patient's spiritual, cultural, and educational needs considered and patient agreeable to plan of care and goals.           Goals:   Active       long term goals        Patient to improve right hip flexion strength MMT score by 2/3  (Progressing)       Start:  02/27/25    Expected End:  04/24/25            Patient to improve FGA score to 25/30 (Progressing)       Start:  02/27/25    Expected End:  04/24/25            Patient to improve 6 MWT distance to 1450 feet  (Progressing)       Start:  02/27/25    Expected End:  04/24/25            patient to improve BLE SLS time to 5 seconds  (Progressing)        Start:  02/27/25    Expected End:  04/24/25              Resolved       Short term goals        Patient to be independent with established home exercise program  (Met)       Start:  02/27/25    Expected End:  03/27/25    Resolved:  04/25/25         Patient to improve hip flexion MMT by 1/3  (Met)       Start:  02/27/25    Expected End:  03/27/25    Resolved:  04/25/25         Patient to improve right  knee flexion MMT by 1/3 (Met)       Start:  02/27/25    Expected End:  03/27/25    Resolved:  04/25/25         Patient to improve FGA score to 21/30 (Met)       Start:  02/27/25    Expected End:  03/27/25    Resolved:  04/25/25             Josette Murphy, PT

## 2025-04-25 NOTE — PROGRESS NOTES
"  Outpatient Rehab    Occupational Therapy Visit    Patient Name: Peter Munoz  MRN: [id number]  YOB: 1955  Encounter Date: 4/25/2025    Therapy Diagnosis:   Encounter Diagnoses   Name Primary?    Alteration in performance of activities of daily living Yes    Alteration in instrumental activities of daily living (IADL)     Unspecified lack of coordination     Muscle weakness of right upper extremity      Physician: Racquel Lazcano MD    Physician Orders: Eval and Treat  Medical Diagnosis: Cerebrovascular accident (CVA) due to embolism of left middle cerebral artery    Visit # / Visits Authorized: 12 / 20  Insurance Authorization Period: 3/6/2025 to 12/31/2025  Date of Evaluation: 2/27/2025 ; Progress Note Completed 4/16/25  Plan of Care Certification:  2/27/2025 to 5/13/2025        Time In: 0805   Time Out: 0845  Total Time: 40   Total Billable Time: 40             Subjective   "This is ridiculous that I can't do this." -pt. referring to maintaining spacing and size with pre-writing strokes.           Treatment:  Therapeutic Activity  TA 1: Pt. replicated various pre-writing strokes, several reps each with increased reps for loops and peaks/pits 2/2 increased difficulty/decreased accuracy with maintaining spacing and size when progressing to R and down page; attempted performing to metronome at 35 bpm for pacing and sizing, cont'd to demo difficulty with reducing pace/maintaining size  TA 2: Replicated 2D to 3D /VS design, Min A for accuracy and extended time  TA 3: Completed visuospatial puzzle, beginner level, Mod A required for sequencing and problem solving    Time Entry(in minutes):  Therapeutic Activity Time Entry: 40    Assessment & Plan   Assessment: Pt. cont's to demo difficulty with spacing, sizing, and pacing while progressing to R and down page during handwriting practice, with Poor response to use of metronome and v/c in attempt to reduce pace/maintain accuracy. Pt. also demonstrating " ongoing /VS deficits, requiring Min/Mod A for beginner level puzzle set up and problem solving.       Patient will continue to benefit from skilled outpatient occupational therapy to address the deficits listed in the problem list box on initial evaluation, provide pt/family education and to maximize pt's level of independence in the home and community environment.     Patient's spiritual, cultural, and educational needs considered and patient agreeable to plan of care and goals.           Plan: OT plan to continue 2x/week to address RUE sensorimotor, proprioceptive/kinesthetic movement awareness, global joint stiffness/mm tightness, balance, fxnl ax tolerance, and executive functioning deficits to reduce risk of falls, further debility, and secondary musculoskeletal damage/impairments, increase safety, independence, and efficiency with ADLs, IADLs, work, and  roles/responsibilities    Goals:   Active       Long Term Goals       Pt. will increase AROM in all deficits areas of RUE. (Ongoing)       Start:  02/27/25 2/27/25: >/= 3/4 R shoulder extension, IR, supination, & wrist extension  4/16/25: >3/4 IR with palms touching lower back, supination, and wrist extension          Pt. will increase RUE MMT to 5/5 in all deficit areas for improved fxnl use of dominant RUE throughout daily occupational performance.  (Ongoing)       Start:  02/27/25 2/27/25: 4/5 RUE IR, ER, sh ext, sh flexion, supination, wrist ext  4/16/25: 4/5 - 5/5         Pt. will be able to wipe bottom following toileting using dominant RUE. (Ongoing)       Start:  02/27/25 2/27/25: Not able to wipe with RUE  4/16/25: Not able to wipe with RUE         Pt. will demonstrate improved FM coordination as evidence by completing Grooved Peg Board in </= 2:45 mins. (Met)       Start:  02/27/25    Resolved:  04/16/25 2/27/25: 3:23 mins  4/16/25: 2:14 mins         Pt. will improve handwriting legibility and maintain consistency of  legibility to Fair+ for increased independence with legal, financial & medical mgmt. (Ongoing)       Start:  02/27/25 2/27/25: Poor, not consistent as writing progresses to R and down page  4/16/25: Fair            Long Term Goals (Cont'd)       Pt. will improve R side proprioception to >/= Fair+ for reduced risk of falls and improved fxnl use of dominant R side throughout daily occupational performance. (Ongoing)       Start:  02/27/25 2/27/25: Fair-/Fair  4/16/25: Fair         Pt. will perform global stretching HEP Ind. (Ongoing)       Start:  02/27/25 4/16/25: Initiated         Pt. will perform RUE strengthening HEP Ind. (Ongoing)       Start:  02/27/25               Long Term Goals (Cont'd)       Pt. will increase typing speed to >/= 22 wpm with >/=90% accuracy for 3 min typing duration to increase independence & efficiency with work requirements.  (Ongoing)       Start:  03/06/25       3/6/25: 10 wpm, 91% accuracy  4/16/25: NT         Pt. will increase ease & efficiency donning clothing over RUE as evidence by rating task </= 1/7 on FTRF. (Ongoing)       Start:  03/06/25       3/6/25: 3/7  4/16/25: 3/7         Pt. will increase efficiency & safety donning pants/shorts in standing as evidence by rating task </= 3/7 on FTRF. (Met)       Start:  03/06/25    Resolved:  04/16/25    3/6/25: 4/7  4/16/25: 3/7            Short Term Goals       Pt. will demonstrate improved FM coordination as evidence by completing Grooved Peg Board in </= 3:00 mins. (Met)       Start:  02/27/25    Resolved:  04/16/25 2/27/25: 3:23 mins  4/16/25: 2:14 mins         Pt. will improve handwriting legibility and maintain consistency of legibility to Fair for increased independence with legal, financial & medical mgmt. (Met)       Start:  02/27/25    Resolved:  04/16/25 2/27/25: Poor, not consistent as writing progresses to R and down page  4/16/25: Fair         OT will provide training/education on global stretching  HEP to promote tissue lengthening necessary for improved postural control, fxnl mobility, & fxnl performance throughout ADLs/IADLs. (Ongoing)       Start:  02/27/25       Initiated          TBA Functional Task Recording Form with accompanying goal(s) to follow as needed. (Met)       Start:  02/27/25    Resolved:  03/06/25    Opening containers/bottles, cutting with knife, putting R arm thru shirt/jacket sleeve, wiping bottom with RUE, phone/computer use         OT will provide training/education on RUE proximal strengthening/stability HEP. (Ongoing)       Start:  02/27/25               Short Term Goals (Cont'd)       Pt. will increase typing speed to >/= 17 wpm with >/= 90% accuracy for 3 min typing duration to increase independence & efficiency with work requirements.  (Ongoing)       Start:  03/06/25       3/6/25: 10 wpm, 91% accuracy  4/16/25: NT         Pt. will increase ease and independence with wiping bottom using dominant RUE as evidence by scoring task </= 2/7 on FTRF. (Ongoing)       Start:  03/06/25       3/6/25: 4/7  4/16/25: 5/7             Sukumar Holt, OT

## 2025-04-28 NOTE — PROGRESS NOTES
Outpatient Rehab    Physical Therapy Visit    Patient Name: Peter Munoz  MRN: [id number]  YOB: 1955  Encounter Date: 4/29/2025    Therapy Diagnosis:   Encounter Diagnosis   Name Primary?    Impaired functional mobility, balance, gait, and endurance Yes     Physician: Racquel Lazcano MD    Physician Orders: Eval and Treat  Medical Diagnosis: Cerebrovascular accident (CVA) due to embolism of left middle cerebral artery    Visit # / Visits Authorized:  9 / 20  Insurance Authorization Period: 2/27/2025 to 12/31/2025  Date of Evaluation:  2/27/2025   Plan of Care Certification:  4/25/2024- 6/202025     PT/PTA:     Number of PTA visits since last PT visit:   Time In: 0845   Time Out: 0930  Total Time: 45   Total Billable Time:  45 minutes     FOTO:  Intake Score:  %  Survey Score 1:  %  Survey Score 2:  %         Subjective   that he feels like he has made significnat improvements with therapy.  Pain reported as 0/10. NA    Objective            Treatment:  Therapeutic Exercise  TE 1: 10 minutes ambulation on treadmill at 1.8 MPH with BUE support  Balance/Neuromuscular Re-Education  NMR 1: 2 x 60 seconds L/R weight shifts on two point wooden fitter, CGA  NMR 2: 2 x 60 seconds ant/ post weight shifts on two point wooden fitter, CGA  NMR 3: 10 reps BLE leading single leg step up to soft side of BOSU with opposite leg hike  NMR 4: 2 x 30 seconds split stance on hard side of BOSU, eyes open  NMR 5: 2 x 30 seconds split stance on hard side of BOSU, eyes closed  Therapeutic Activity  TA 1: 3 x 10 reps sit to stands from hard side of BOSU  TA 4: 2 x 100 feet ambulation with vertical head turns while holding tidal tank  TA 6: 2 x 100 feet ambulation with horizontal head turns while holding tidal tank    Time Entry(in minutes):  Neuromuscular Re-Education Time Entry: 25  Therapeutic Activity Time Entry: 10  Therapeutic Exercise Time Entry: 10    Assessment & Plan   Assessment: Peter tolerated session well with  emphasis placed on high level dynamic balance activities. The patient requires only two seated rest breaks throughout session and tolerates balance activities with minimal to no UE support. The patient would likely benefit from continued PT intervention to address remaining functional mobility deficits.  Evaluation/Treatment Tolerance: Patient tolerated treatment well    Patient will continue to benefit from skilled outpatient physical therapy to address the deficits listed in the problem list box on initial evaluation, provide pt/family education and to maximize pt's level of independence in the home and community environment.     Patient's spiritual, cultural, and educational needs considered and patient agreeable to plan of care and goals.           Plan: focus on dynamic balance, strength and endurance training as tolerated    Goals:   Active       long term goals        Patient to improve right hip flexion strength MMT score by 2/3  (Progressing)       Start:  02/27/25    Expected End:  04/24/25            Patient to improve FGA score to 25/30 (Progressing)       Start:  02/27/25    Expected End:  04/24/25            Patient to improve 6 MWT distance to 1450 feet  (Progressing)       Start:  02/27/25    Expected End:  04/24/25            patient to improve BLE SLS time to 5 seconds  (Progressing)       Start:  02/27/25    Expected End:  04/24/25              Resolved       Short term goals        Patient to be independent with established home exercise program  (Met)       Start:  02/27/25    Expected End:  03/27/25    Resolved:  04/25/25         Patient to improve hip flexion MMT by 1/3  (Met)       Start:  02/27/25    Expected End:  03/27/25    Resolved:  04/25/25         Patient to improve right  knee flexion MMT by 1/3 (Met)       Start:  02/27/25    Expected End:  03/27/25    Resolved:  04/25/25         Patient to improve FGA score to 21/30 (Met)       Start:  02/27/25    Expected End:  03/27/25     Resolved:  04/25/25             Josette Murphy, PT

## 2025-04-29 ENCOUNTER — CLINICAL SUPPORT (OUTPATIENT)
Dept: REHABILITATION | Facility: HOSPITAL | Age: 70
End: 2025-04-29
Payer: MEDICARE

## 2025-04-29 DIAGNOSIS — Z74.09 IMPAIRED FUNCTIONAL MOBILITY, BALANCE, GAIT, AND ENDURANCE: Primary | ICD-10-CM

## 2025-04-29 PROCEDURE — 97110 THERAPEUTIC EXERCISES: CPT | Mod: PO

## 2025-04-29 PROCEDURE — 97112 NEUROMUSCULAR REEDUCATION: CPT | Mod: PO

## 2025-05-01 ENCOUNTER — CLINICAL SUPPORT (OUTPATIENT)
Dept: REHABILITATION | Facility: HOSPITAL | Age: 70
End: 2025-05-01
Payer: MEDICARE

## 2025-05-01 DIAGNOSIS — Z74.09 IMPAIRED FUNCTIONAL MOBILITY, BALANCE, GAIT, AND ENDURANCE: Primary | ICD-10-CM

## 2025-05-01 PROCEDURE — 97112 NEUROMUSCULAR REEDUCATION: CPT | Mod: PO

## 2025-05-01 PROCEDURE — 97110 THERAPEUTIC EXERCISES: CPT | Mod: PO

## 2025-05-01 PROCEDURE — 97530 THERAPEUTIC ACTIVITIES: CPT | Mod: PO

## 2025-05-01 NOTE — PROGRESS NOTES
Outpatient Rehab    Physical Therapy Visit    Patient Name: Peter Munoz  MRN: [id number]  YOB: 1955  Encounter Date: 5/1/2025    Therapy Diagnosis:   Encounter Diagnosis   Name Primary?    Impaired functional mobility, balance, gait, and endurance Yes     Physician: Racquel Lazcano MD    Physician Orders: Eval and Treat  Medical Diagnosis: Cerebrovascular accident (CVA) due to embolism of left middle cerebral artery    Visit # / Visits Authorized:  10 / 20  Insurance Authorization Period: 2/27/2025 to 12/31/2025  Date of Evaluation:  2/27/2025   Plan of Care Certification:  4/25/2024- 6/202025     PT/PTA:     Number of PTA visits since last PT visit:   Time In: 1115   Time Out: 1200  Total Time: 45   Total Billable Time: 45 minutes     FOTO:  Intake Score:  %  Survey Score 1:  %  Survey Score 2:  %         Subjective   feeling a little tired today but otherwise nothing new.  Pain reported as 0/10.      Objective            Treatment:  Therapeutic Exercise  TE 1: 10 minutes ambulation on treadmill at 1.8 MPH with BUE support  Balance/Neuromuscular Re-Education  NMR 1: 4 laps marching with three second holds, CGA  NMR 2: 4 laps tandem ambulation, CGA  NMR 3: 10 reps BLE leading double large cone tapping, CGA  NMR 4: 2 x 30 seconds split stance on sand dune, eyes closed  Therapeutic Activity  TA 1: 3 x 10 reps sit to stands foam pad while holding tidal tank, SBA  TA 2: 2 x 10 reps step up to sand dune with oppsite leg hike, cues to slow down    Time Entry(in minutes):  Neuromuscular Re-Education Time Entry: 20  Therapeutic Activity Time Entry: 15  Therapeutic Exercise Time Entry: 10    Assessment & Plan   Assessment: Peter tolerated session well with no seated rets breaks required throughout session.  The patient demonstrates good carry over of verbal cues for slow and controlled movements throughout session. The patient would likely benefit from continued PT intervention to address remaining  functional mobility deficits.  Evaluation/Treatment Tolerance: Patient tolerated treatment well    Patient will continue to benefit from skilled outpatient physical therapy to address the deficits listed in the problem list box on initial evaluation, provide pt/family education and to maximize pt's level of independence in the home and community environment.     Patient's spiritual, cultural, and educational needs considered and patient agreeable to plan of care and goals.           Plan: focus on dynamic balance, strength and endurance training as tolerated    Goals:   Active       long term goals        Patient to improve right hip flexion strength MMT score by 2/3  (Progressing)       Start:  02/27/25    Expected End:  04/24/25            Patient to improve FGA score to 25/30 (Progressing)       Start:  02/27/25    Expected End:  04/24/25            Patient to improve 6 MWT distance to 1450 feet  (Progressing)       Start:  02/27/25    Expected End:  04/24/25            patient to improve BLE SLS time to 5 seconds  (Progressing)       Start:  02/27/25    Expected End:  04/24/25              Resolved       Short term goals        Patient to be independent with established home exercise program  (Met)       Start:  02/27/25    Expected End:  03/27/25    Resolved:  04/25/25         Patient to improve hip flexion MMT by 1/3  (Met)       Start:  02/27/25    Expected End:  03/27/25    Resolved:  04/25/25         Patient to improve right  knee flexion MMT by 1/3 (Met)       Start:  02/27/25    Expected End:  03/27/25    Resolved:  04/25/25         Patient to improve FGA score to 21/30 (Met)       Start:  02/27/25    Expected End:  03/27/25    Resolved:  04/25/25             Josette Murphy, PT

## 2025-05-06 ENCOUNTER — CLINICAL SUPPORT (OUTPATIENT)
Dept: REHABILITATION | Facility: HOSPITAL | Age: 70
End: 2025-05-06
Payer: MEDICARE

## 2025-05-06 ENCOUNTER — TELEPHONE (OUTPATIENT)
Dept: REHABILITATION | Facility: HOSPITAL | Age: 70
End: 2025-05-06
Payer: MEDICARE

## 2025-05-06 DIAGNOSIS — Z74.09 IMPAIRED FUNCTIONAL MOBILITY, BALANCE, GAIT, AND ENDURANCE: Primary | ICD-10-CM

## 2025-05-06 PROCEDURE — 97530 THERAPEUTIC ACTIVITIES: CPT | Mod: PO

## 2025-05-06 PROCEDURE — 97112 NEUROMUSCULAR REEDUCATION: CPT | Mod: PO

## 2025-05-06 PROCEDURE — 97110 THERAPEUTIC EXERCISES: CPT | Mod: PO

## 2025-05-06 NOTE — TELEPHONE ENCOUNTER
"Called patient regarding no show to today's OT appointment. Pt reported he was unaware of this appointment and that "it wasn't on my schedule". Pt confirmed PT appointment at 9:30.     CLAY Jefferson LOTR  05/06/2025    "

## 2025-05-06 NOTE — PROGRESS NOTES
Outpatient Rehab    Physical Therapy Visit    Patient Name: Peter Munoz  MRN: [id number]  YOB: 1955  Encounter Date: 5/6/2025    Therapy Diagnosis:   Encounter Diagnosis   Name Primary?    Impaired functional mobility, balance, gait, and endurance Yes     Physician: Racquel Lazcano MD    Physician Orders: Eval and Treat  Medical Diagnosis: Cerebrovascular accident (CVA) due to embolism of left middle cerebral artery    Visit # / Visits Authorized:  11 / 20  Insurance Authorization Period: 2/27/2025 to 12/31/2025  Date of Evaluation:  2/27/2025   Plan of Care Certification:  4/25/2024- 6/202025                   PT/PTA:     Number of PTA visits since last PT visit:   Time In: 0930   Time Out: 1015  Total Time (in minutes): 45   Total Billable Time (in minutes):  45 minutes     FOTO:  Intake Score:  %  Survey Score 2:  %  Survey Score 3:  %         Subjective   feeling fine, did not know he had OT before this.  Pain reported as 0/10.      Objective            Treatment:  Therapeutic Exercise  TE 1: 10 minutes ambulation on Gaitbetter treadmill at 1.8 MPH with BUE support  Balance/Neuromuscular Re-Education  NMR 1: 4 laps marching with three second holds, CGA  NMR 2: 4 laps tandem ambulation, CGA  Therapeutic Activity  TA 1: 3 x 10 reps sit to stands foam fitter while holding tidal tank, SBA  TA 2: 2 x 10 reps step up to sand dune with oppsite leg hike, cues to slow down  TA 3: 2 x 100 feet ambulation with 2.2# ball toss to/from tech, CGA  TA 4: 2 x 100 feet ambulation with vertical head turns while holding tidal tank  TA 5: multiple seated rest breaks throughout session  TA 6: 2 x 100 feet ambulation with horizontal head turns while holding tidal tank  TA 7: 2 x 100 feet ambulation with 2.2# ball toss to/from tech with 360 turns on command , CGA    Time Entry(in minutes):  Neuromuscular Re-Education Time Entry: 8  Therapeutic Activity Time Entry: 15  Therapeutic Exercise Time Entry: 22    Assessment  & Plan   Assessment: Peter tolerated session well.  Gaitbetter performed today for CV endurance with dynamic balance challenges which patient did very well with. Improved stability noted throughout session today. The patient would likely benefit from continued PT intervention to address remaining functional mobility deficits.  Evaluation/Treatment Tolerance: Patient tolerated treatment well    Patient will continue to benefit from skilled outpatient physical therapy to address the deficits listed in the problem list box on initial evaluation, provide pt/family education and to maximize pt's level of independence in the home and community environment.     Patient's spiritual, cultural, and educational needs considered and patient agreeable to plan of care and goals.           Plan: focus on dynamic balance, strength and endurance training as tolerated    Goals:   Active       long term goals        Patient to improve right hip flexion strength MMT score by 2/3  (Progressing)       Start:  02/27/25    Expected End:  04/24/25            Patient to improve FGA score to 25/30 (Progressing)       Start:  02/27/25    Expected End:  04/24/25            Patient to improve 6 MWT distance to 1450 feet  (Progressing)       Start:  02/27/25    Expected End:  04/24/25            patient to improve BLE SLS time to 5 seconds  (Progressing)       Start:  02/27/25    Expected End:  04/24/25              Resolved       Short term goals        Patient to be independent with established home exercise program  (Met)       Start:  02/27/25    Expected End:  03/27/25    Resolved:  04/25/25         Patient to improve hip flexion MMT by 1/3  (Met)       Start:  02/27/25    Expected End:  03/27/25    Resolved:  04/25/25         Patient to improve right  knee flexion MMT by 1/3 (Met)       Start:  02/27/25    Expected End:  03/27/25    Resolved:  04/25/25         Patient to improve FGA score to 21/30 (Met)       Start:  02/27/25    Expected  End:  03/27/25    Resolved:  04/25/25             Josette Murphy, PT

## 2025-05-08 ENCOUNTER — CLINICAL SUPPORT (OUTPATIENT)
Dept: REHABILITATION | Facility: HOSPITAL | Age: 70
End: 2025-05-08
Payer: MEDICARE

## 2025-05-08 DIAGNOSIS — Z74.09 IMPAIRED FUNCTIONAL MOBILITY, BALANCE, GAIT, AND ENDURANCE: Primary | ICD-10-CM

## 2025-05-08 DIAGNOSIS — Z78.9 ALTERATION IN PERFORMANCE OF ACTIVITIES OF DAILY LIVING: Primary | ICD-10-CM

## 2025-05-08 DIAGNOSIS — R27.9 UNSPECIFIED LACK OF COORDINATION: ICD-10-CM

## 2025-05-08 DIAGNOSIS — Z78.9 ALTERATION IN INSTRUMENTAL ACTIVITIES OF DAILY LIVING (IADL): ICD-10-CM

## 2025-05-08 DIAGNOSIS — M62.81 MUSCLE WEAKNESS OF RIGHT UPPER EXTREMITY: ICD-10-CM

## 2025-05-08 PROCEDURE — 97530 THERAPEUTIC ACTIVITIES: CPT | Mod: PO

## 2025-05-08 PROCEDURE — 97112 NEUROMUSCULAR REEDUCATION: CPT | Mod: PO

## 2025-05-08 NOTE — PROGRESS NOTES
Outpatient Rehab    Physical Therapy Visit    Patient Name: Peter Munoz  MRN: [id number]  YOB: 1955  Encounter Date: 5/8/2025    Therapy Diagnosis: No diagnosis found.  Physician: Racquel Lazcano MD    Physician Orders: Eval and Treat  Medical Diagnosis: Cerebrovascular accident (CVA) due to embolism of left middle cerebral artery    Visit # / Visits Authorized:  12 / 20  Insurance Authorization Period: 2/27/2025 to 12/31/2025  Date of Evaluation:  2/27/2025   Plan of Care Certification:  4/25/2024- 6/202025     PT/PTA:     Number of PTA visits since last PT visit:   Time In:     Time Out:    Total Time (in minutes):     Total Billable Time (in minutes):      FOTO:  Intake Score:  %  Survey Score 2:  %  Survey Score 3:  %         Subjective             Objective            Treatment:       Time Entry(in minutes):       Assessment & Plan   Assessment:         Patient will continue to benefit from skilled outpatient physical therapy to address the deficits listed in the problem list box on initial evaluation, provide pt/family education and to maximize pt's level of independence in the home and community environment.     Patient's spiritual, cultural, and educational needs considered and patient agreeable to plan of care and goals.           Plan:      Goals:   Active       long term goals        Patient to improve right hip flexion strength MMT score by 2/3  (Progressing)       Start:  02/27/25    Expected End:  04/24/25            Patient to improve FGA score to 25/30 (Progressing)       Start:  02/27/25    Expected End:  04/24/25            Patient to improve 6 MWT distance to 1450 feet  (Progressing)       Start:  02/27/25    Expected End:  04/24/25            patient to improve BLE SLS time to 5 seconds  (Progressing)       Start:  02/27/25    Expected End:  04/24/25              Resolved       Short term goals        Patient to be independent with established home exercise program  (Met)        Start:  02/27/25    Expected End:  03/27/25    Resolved:  04/25/25         Patient to improve hip flexion MMT by 1/3  (Met)       Start:  02/27/25    Expected End:  03/27/25    Resolved:  04/25/25         Patient to improve right  knee flexion MMT by 1/3 (Met)       Start:  02/27/25    Expected End:  03/27/25    Resolved:  04/25/25         Patient to improve FGA score to 21/30 (Met)       Start:  02/27/25    Expected End:  03/27/25    Resolved:  04/25/25             Josette Murphy, PT

## 2025-05-12 NOTE — PROGRESS NOTES
Outpatient Rehab    Physical Therapy Visit    Patient Name: Peter Munoz  MRN: [id number]  YOB: 1955  Encounter Date: 5/13/2025    Therapy Diagnosis:   Encounter Diagnosis   Name Primary?    Impaired functional mobility, balance, gait, and endurance Yes     Physician: Racquel Lazcano MD    Physician Orders: Eval and Treat  Medical Diagnosis: Cerebrovascular accident (CVA) due to embolism of left middle cerebral artery    Visit # / Visits Authorized:  13 / 20  Insurance Authorization Period: 2/27/2025 to 12/31/2025  Date of Evaluation: 2/27/2025  Plan of Care Certification: 4/25/2025 to 6/20/2025      PT/PTA:     Number of PTA visits since last PT visit:   Time In: 0845   Time Out: 0930  Total Time (in minutes): 45   Total Billable Time (in minutes):  45 minutes     FOTO:  Intake Score:  %  Survey Score 2:  %  Survey Score 3:  %    Precautions:       Subjective   that he is doing a lot better today.  Pain reported as 0/10.      Objective            Treatment:  Therapeutic Exercise  TE 1: 10 minutes ambulation on Gaitbetter treadmill at 1.8 MPH with BUE support  Manual Therapy  MT 1: 2 x 60 seconds anterior/ posterior weight shifts on two point wooden fitter board  MT 2: 2 x 60 seconds left/ right weight shifts on two point wooden fitter board  MT 3: 2 x 30 seconds tandem stance with BLE leading  Therapeutic Activity  TA 1: 3 x 10 reps sit to stands while standing on hard side of BOSU , SBA  TA 2: 2 x 10 reps step up to sand dune with oppsite leg hike, cues to slow down  TA 3: 2 x 100 feet ambulation with 2.2# ball toss to/from tech, CGA  TA 4: 2 x 100 feet ambulation with vertical head turns while holding tidal tank  TA 5: multiple seated rest breaks throughout session  TA 6: 2 x 100 feet ambulation with horizontal head turns while holding tidal tank  TA 7: 6 laps forward reciprocal stepping over 6 large hurdles with no UE support, CGA  TA 8: 4 laps side stepping over 6 large hurdles with no UE  support, CGA    Time Entry(in minutes):  Neuromuscular Re-Education Time Entry: 10  Therapeutic Activity Time Entry: 25  Therapeutic Exercise Time Entry: 10    Assessment & Plan   Assessment: Peter tolerated today's session well with improved activity tolerance noted throughout today's session. Only one brief standing water break required throughout session with decreased reports of fatigue throughout session as well. The patient would likely benefit from continued PT intervention to address remaining functional mobility deficits.  Evaluation/Treatment Tolerance: Patient tolerated treatment well    Patient will continue to benefit from skilled outpatient physical therapy to address the deficits listed in the problem list box on initial evaluation, provide pt/family education and to maximize pt's level of independence in the home and community environment.     Patient's spiritual, cultural, and educational needs considered and patient agreeable to plan of care and goals.           Plan: focus on dynamic balance, strength and endurance training as tolerated    Goals:   Active       long term goals        Patient to improve right hip flexion strength MMT score by 2/3  (Progressing)       Start:  02/27/25    Expected End:  04/24/25            Patient to improve FGA score to 25/30 (Progressing)       Start:  02/27/25    Expected End:  04/24/25            Patient to improve 6 MWT distance to 1450 feet  (Progressing)       Start:  02/27/25    Expected End:  04/24/25            patient to improve BLE SLS time to 5 seconds  (Progressing)       Start:  02/27/25    Expected End:  04/24/25              Resolved       Short term goals        Patient to be independent with established home exercise program  (Met)       Start:  02/27/25    Expected End:  03/27/25    Resolved:  04/25/25         Patient to improve hip flexion MMT by 1/3  (Met)       Start:  02/27/25    Expected End:  03/27/25    Resolved:  04/25/25         Patient to  improve right  knee flexion MMT by 1/3 (Met)       Start:  02/27/25    Expected End:  03/27/25    Resolved:  04/25/25         Patient to improve FGA score to 21/30 (Met)       Start:  02/27/25    Expected End:  03/27/25    Resolved:  04/25/25             Josette Murphy, PT

## 2025-05-13 ENCOUNTER — CLINICAL SUPPORT (OUTPATIENT)
Dept: REHABILITATION | Facility: HOSPITAL | Age: 70
End: 2025-05-13
Payer: MEDICARE

## 2025-05-13 DIAGNOSIS — Z78.9 ALTERATION IN PERFORMANCE OF ACTIVITIES OF DAILY LIVING: Primary | ICD-10-CM

## 2025-05-13 DIAGNOSIS — Z78.9 ALTERATION IN INSTRUMENTAL ACTIVITIES OF DAILY LIVING (IADL): ICD-10-CM

## 2025-05-13 DIAGNOSIS — R27.9 UNSPECIFIED LACK OF COORDINATION: ICD-10-CM

## 2025-05-13 DIAGNOSIS — M62.81 MUSCLE WEAKNESS OF RIGHT UPPER EXTREMITY: ICD-10-CM

## 2025-05-13 DIAGNOSIS — Z74.09 IMPAIRED FUNCTIONAL MOBILITY, BALANCE, GAIT, AND ENDURANCE: Primary | ICD-10-CM

## 2025-05-13 PROCEDURE — 97112 NEUROMUSCULAR REEDUCATION: CPT | Mod: PO

## 2025-05-13 PROCEDURE — 97530 THERAPEUTIC ACTIVITIES: CPT | Mod: PO

## 2025-05-13 NOTE — PROGRESS NOTES
Outpatient Rehab    Occupational Therapy Visit    Patient Name: Peter Munoz  MRN: [id number]  YOB: 1955  Encounter Date: 5/8/2025    Therapy Diagnosis:   Encounter Diagnoses   Name Primary?    Alteration in performance of activities of daily living Yes    Alteration in instrumental activities of daily living (IADL)     Unspecified lack of coordination     Muscle weakness of right upper extremity      Physician: Racquel Lazcano MD    Physician Orders: Eval and Treat  Medical Diagnosis: Cerebrovascular accident (CVA) due to embolism of left middle cerebral artery    Visit # / Visits Authorized: 13 / 20  Insurance Authorization Period: 3/6/2025 to 12/31/2025  Date of Evaluation: 2/27/2025  Plan of Care Certification: 2/27/2025 to 5/13/2025      Time In: 1036   Time Out: 1116  Total Time (in minutes): 40   Total Billable Time (in minutes): 40        Treatment:  Therapeutic Activity  TA 1: VMI task to replicate 2D to 3D IQ Fit design (beginner level) with OT selecting pieces required then pt. replicating design following picture, extended time to complete with assistance for accurate orientation of final piece after several unsuccessful attempts; pt. then required to solve puzzle , Mod A  Balance/Neuromuscular Re-Education  NMR 1: VMI task to place keys into keyboard following pictorial design, max difficulty with aligning properly and difficulty shifting to finger tips of R hand for successful completion    Time Entry(in minutes):  Neuromuscular Re-Education Time Entry: 15  Therapeutic Activity Time Entry: 25    Assessment & Plan   Assessment: Pt. required extended time and difficulty to complete all ax's this date, with visuospatial/visual perceptual skill deficits noting to present as greatest barrier to successful/accurate task completion.       Patient will continue to benefit from skilled outpatient occupational therapy to address the deficits listed in the problem list box on initial evaluation,  provide pt/family education and to maximize pt's level of independence in the home and community environment.     Patient's spiritual, cultural, and educational needs considered and patient agreeable to plan of care and goals.           Plan: OT plan to continue 2x/week to address RUE sensorimotor, proprioceptive/kinesthetic movement awareness, global joint stiffness/mm tightness, balance, fxnl ax tolerance, and executive functioning deficits to reduce risk of falls, further debility, and secondary musculoskeletal damage/impairments, increase safety, independence, and efficiency with ADLs, IADLs, work, and  roles/responsibilities    Goals:   Active       Long Term Goals       Pt. will increase AROM in all deficits areas of RUE. (Ongoing)       Start:  02/27/25 2/27/25: >/= 3/4 R shoulder extension, IR, supination, & wrist extension  4/16/25: >3/4 IR with palms touching lower back, supination, and wrist extension          Pt. will increase RUE MMT to 5/5 in all deficit areas for improved fxnl use of dominant RUE throughout daily occupational performance.  (Ongoing)       Start:  02/27/25 2/27/25: 4/5 RUE IR, ER, sh ext, sh flexion, supination, wrist ext  4/16/25: 4/5 - 5/5         Pt. will be able to wipe bottom following toileting using dominant RUE. (Ongoing)       Start:  02/27/25 2/27/25: Not able to wipe with RUE  4/16/25: Not able to wipe with RUE         Pt. will demonstrate improved FM coordination as evidence by completing Grooved Peg Board in </= 2:45 mins. (Met)       Start:  02/27/25    Resolved:  04/16/25 2/27/25: 3:23 mins  4/16/25: 2:14 mins         Pt. will improve handwriting legibility and maintain consistency of legibility to Fair+ for increased independence with legal, financial & medical mgmt. (Ongoing)       Start:  02/27/25 2/27/25: Poor, not consistent as writing progresses to R and down page  4/16/25: Fair            Long Term Goals (Cont'd)       Pt. will  improve R side proprioception to >/= Fair+ for reduced risk of falls and improved fxnl use of dominant R side throughout daily occupational performance. (Ongoing)       Start:  02/27/25 2/27/25: Fair-/Fair  4/16/25: Fair         Pt. will perform global stretching HEP Ind. (Ongoing)       Start:  02/27/25 4/16/25: Initiated         Pt. will perform RUE strengthening HEP Ind. (Ongoing)       Start:  02/27/25               Long Term Goals (Cont'd)       Pt. will increase typing speed to >/= 22 wpm with >/=90% accuracy for 3 min typing duration to increase independence & efficiency with work requirements.  (Ongoing)       Start:  03/06/25       3/6/25: 10 wpm, 91% accuracy  4/16/25: NT         Pt. will increase ease & efficiency donning clothing over RUE as evidence by rating task </= 1/7 on FTRF. (Ongoing)       Start:  03/06/25       3/6/25: 3/7  4/16/25: 3/7         Pt. will increase efficiency & safety donning pants/shorts in standing as evidence by rating task </= 3/7 on FTRF. (Met)       Start:  03/06/25    Resolved:  04/16/25    3/6/25: 4/7  4/16/25: 3/7            Short Term Goals       Pt. will demonstrate improved FM coordination as evidence by completing Grooved Peg Board in </= 3:00 mins. (Met)       Start:  02/27/25    Resolved:  04/16/25 2/27/25: 3:23 mins  4/16/25: 2:14 mins         Pt. will improve handwriting legibility and maintain consistency of legibility to Fair for increased independence with legal, financial & medical mgmt. (Met)       Start:  02/27/25    Resolved:  04/16/25 2/27/25: Poor, not consistent as writing progresses to R and down page  4/16/25: Fair         OT will provide training/education on global stretching HEP to promote tissue lengthening necessary for improved postural control, fxnl mobility, & fxnl performance throughout ADLs/IADLs. (Ongoing)       Start:  02/27/25       Initiated          TBA Functional Task Recording Form with accompanying goal(s) to follow  as needed. (Met)       Start:  02/27/25    Resolved:  03/06/25    Opening containers/bottles, cutting with knife, putting R arm thru shirt/jacket sleeve, wiping bottom with RUE, phone/computer use         OT will provide training/education on RUE proximal strengthening/stability HEP. (Ongoing)       Start:  02/27/25               Short Term Goals (Cont'd)       Pt. will increase typing speed to >/= 17 wpm with >/= 90% accuracy for 3 min typing duration to increase independence & efficiency with work requirements.  (Ongoing)       Start:  03/06/25       3/6/25: 10 wpm, 91% accuracy  4/16/25: NT         Pt. will increase ease and independence with wiping bottom using dominant RUE as evidence by scoring task </= 2/7 on FTRF. (Ongoing)       Start:  03/06/25       3/6/25: 4/7  4/16/25: 5/7             Sukumar Holt, OT

## 2025-05-15 ENCOUNTER — CLINICAL SUPPORT (OUTPATIENT)
Dept: REHABILITATION | Facility: HOSPITAL | Age: 70
End: 2025-05-15
Payer: MEDICARE

## 2025-05-15 VITALS — SYSTOLIC BLOOD PRESSURE: 138 MMHG | DIASTOLIC BLOOD PRESSURE: 118 MMHG | HEART RATE: 98 BPM

## 2025-05-15 DIAGNOSIS — Z78.9 ALTERATION IN INSTRUMENTAL ACTIVITIES OF DAILY LIVING (IADL): ICD-10-CM

## 2025-05-15 DIAGNOSIS — M62.81 MUSCLE WEAKNESS OF RIGHT UPPER EXTREMITY: ICD-10-CM

## 2025-05-15 DIAGNOSIS — Z78.9 ALTERATION IN PERFORMANCE OF ACTIVITIES OF DAILY LIVING: Primary | ICD-10-CM

## 2025-05-15 DIAGNOSIS — R27.9 UNSPECIFIED LACK OF COORDINATION: ICD-10-CM

## 2025-05-15 PROCEDURE — 97110 THERAPEUTIC EXERCISES: CPT | Mod: PO

## 2025-05-15 PROCEDURE — 97530 THERAPEUTIC ACTIVITIES: CPT | Mod: PO

## 2025-05-16 NOTE — PROGRESS NOTES
Outpatient Rehab    Occupational Therapy Visit    Patient Name: Peter Munoz  MRN: [id number]  YOB: 1955  Encounter Date: 5/15/2025    Therapy Diagnosis:   Encounter Diagnoses   Name Primary?    Alteration in performance of activities of daily living Yes    Alteration in instrumental activities of daily living (IADL)     Unspecified lack of coordination     Muscle weakness of right upper extremity      Physician: Racquel Lazcano MD    Physician Orders: Eval and Treat  Medical Diagnosis: Cerebrovascular accident (CVA) due to embolism of left middle cerebral artery    Visit # / Visits Authorized: 15 / 20  Insurance Authorization Period: 3/6/2025 to 12/31/2025  Date of Evaluation: 2/27/2025  Plan of Care Certification: 5/13/2025 to 7/24/2025      Time In: 0935   Time Out: 1025  Total Time (in minutes): 50   Total Billable Time (in minutes): 25         Subjective   Pt. reported not eating anything before taking medication this morning. He also reported recently starting new weight loss medication..         Objective   Vital Signs  BP (!) 138/118   Pulse 98   BP Location: Left arm  BP Position: Sitting  BP Cuff Size: Large adult  Glucose: 126;  BP taken at 10:22 am: 139/107 mmHg, 63 pulse             Treatment:  Therapeutic Exercise  TE 1: Initiated training on global stretching HEP with pt. completing several reps each of child's pose<>cobra; child's pose with threading needle > quadruped with trunk rotation & reaching over head; prone>side lying with axial rotation and thoracic extension, modeling and v/c required as needed for proper form/sequencing; OT educated pt. on purpose/rationale, contraindications/precautions, and plan to provide handout for home use next tx session; tx session ceased midway thru per pt. report of nausea and vomiting  Therapeutic Activity  TA 1: Education provided re: medication management/taking medications as prescribed, consulting with MD re: potentially conflicting  medications requiring taking with food and weight loss medication potentially causing nausea and suppressing appetite, rationale/benefits of establishing care with PCP, and recommended pt. monitor symptoms/vitals remainder of day and report to ED if any worsening/progression occurs. Pt. expressed verbal understanding to all education and recommendations provided.    Time Entry(in minutes):  Therapeutic Activity Time Entry: 10  Therapeutic Exercise Time Entry: 15    Assessment & Plan   Assessment: Tx duration limited this date 2/2 pt. reporting nausea and becoming ill (vomiting) mid way thru session. Pt. reported not eating anything this morning and taking medicine on empty stomach; he also reported recently starting weight loss medication. OT educated pt. on importance of taking medications as prescribed and consulting with MD re: potentially conflicting medications requiring taking with food and weight loss medication potentially contributing to nausea and suppressing appetite. Pt. also reported not having care established with a PCP, with OT recommending pt. establish care with a primary doctor for general, routine care not requiring specialist consult. After resting, consuming saltines and drinking water, pt. reported feeling better and no lingering nausea. OT recommended pt. refrain from excessive activity remainder of day d/t elevated BP and recommended pt. continue to monitor symptoms/vitals and present to ED if worsening/progression occurs. Pt. expressed verbal understanding to all education/recs.       Patient will continue to benefit from skilled outpatient occupational therapy to address the deficits listed in the problem list box on initial evaluation, provide pt/family education and to maximize pt's level of independence in the home and community environment.     Patient's spiritual, cultural, and educational needs considered and patient agreeable to plan of care and goals.           Plan: OT plan to  continue 2x/week to address RUE sensorimotor, proprioceptive/kinesthetic movement awareness, global joint stiffness/mm tightness, balance, fxnl ax tolerance, and executive functioning deficits to reduce risk of falls, further debility, and secondary musculoskeletal damage/impairments, increase safety, independence, and efficiency with ADLs, IADLs, work, and  roles/responsibilities    Goals:   Active       Long Term Goals       Pt. will increase AROM in all deficits areas of RUE. (Ongoing)       Start:  02/27/25 2/27/25: >/= 3/4 R shoulder extension, IR, supination, & wrist extension  4/16/25: >3/4 IR with palms touching lower back, supination, and wrist extension    5/13/25: >3/4 IR with palms touching back and supination         Pt. will increase RUE MMT to 5/5 in all deficit areas for improved fxnl use of dominant RUE throughout daily occupational performance.  (Met)       Start:  02/27/25    Resolved:  05/15/25    2/27/25: 4/5 RUE IR, ER, sh ext, sh flexion, supination, wrist ext  4/16/25: 4/5 - 5/5  5/13/25: 5/5         Pt. will be able to wipe bottom following toileting using dominant RUE. (Ongoing)       Start:  02/27/25 2/27/25: Not able to wipe with RUE  4/16/25: Not able to wipe with RUE  5/13/25: Improving          Pt. will demonstrate improved FM coordination as evidence by completing Grooved Peg Board in </= 2:45 mins. (Met)       Start:  02/27/25    Resolved:  04/16/25 2/27/25: 3:23 mins  4/16/25: 2:14 mins         Pt. will improve handwriting legibility and maintain consistency of legibility to Fair+ for increased independence with legal, financial & medical mgmt. (Ongoing)       Start:  02/27/25 2/27/25: Poor, not consistent as writing progresses to R and down page  4/16/25: Fair  5/13/25: Fair/+            Long Term Goals (Cont'd)       Pt. will improve R side proprioception to >/= Fair+ for reduced risk of falls and improved fxnl use of dominant R side throughout daily  occupational performance. (Ongoing)       Start:  02/27/25 2/27/25: Fair-/Fair  4/16/25: Fair  5/13/25: Fair         Pt. will perform global stretching HEP Ind. (Ongoing)       Start:  02/27/25 4/16/25: Initiated         Pt. will perform RUE strengthening HEP Ind. (Ongoing)       Start:  02/27/25               Long Term Goals (Cont'd)       Pt. will increase typing speed to >/= 22 wpm with >/=90% accuracy for 3 min typing duration to increase independence & efficiency with work requirements.  (Ongoing)       Start:  03/06/25       3/6/25: 10 wpm, 91% accuracy  4/16/25: NT  5/13/25:          Pt. will increase ease & efficiency donning clothing over RUE as evidence by rating task </= 1/7 on FTRF. (Ongoing)       Start:  03/06/25       3/6/25: 3/7  4/16/25: 3/7         Pt. will increase efficiency & safety donning pants/shorts in standing as evidence by rating task </= 3/7 on FTRF. (Met)       Start:  03/06/25    Resolved:  04/16/25    3/6/25: 4/7  4/16/25: 3/7            Short Term Goals       Pt. will demonstrate improved FM coordination as evidence by completing Grooved Peg Board in </= 3:00 mins. (Met)       Start:  02/27/25    Resolved:  04/16/25 2/27/25: 3:23 mins  4/16/25: 2:14 mins         Pt. will improve handwriting legibility and maintain consistency of legibility to Fair for increased independence with legal, financial & medical mgmt. (Met)       Start:  02/27/25    Resolved:  04/16/25 2/27/25: Poor, not consistent as writing progresses to R and down page  4/16/25: Fair         OT will provide training/education on global stretching HEP to promote tissue lengthening necessary for improved postural control, fxnl mobility, & fxnl performance throughout ADLs/IADLs. (Ongoing)       Start:  02/27/25       Initiated          TBA Functional Task Recording Form with accompanying goal(s) to follow as needed. (Met)       Start:  02/27/25    Resolved:  03/06/25    Opening containers/bottles, cutting  with knife, putting R arm thru shirt/jacket sleeve, wiping bottom with RUE, phone/computer use         OT will provide training/education on RUE proximal strengthening/stability HEP. (Ongoing)       Start:  02/27/25               Short Term Goals (Cont'd)       Pt. will increase typing speed to >/= 17 wpm with >/= 90% accuracy for 3 min typing duration to increase independence & efficiency with work requirements.  (Ongoing)       Start:  03/06/25       3/6/25: 10 wpm, 91% accuracy  4/16/25: NT  15 wpm, 95% acc (3 min trial),; 13 wpm 94%; wpm 11, 93%         Pt. will increase ease and independence with wiping bottom using dominant RUE as evidence by scoring task </= 2/7 on FTRF. (Ongoing)       Start:  03/06/25       3/6/25: 4/7  4/16/25: 5/7             Sukumar Holt, OT

## 2025-05-16 NOTE — PROGRESS NOTES
Outpatient Rehab    Occupational Therapy Progress Note : Updated Plan of Care    Patient Name: Peter Munoz  MRN: [id number]  YOB: 1955  Encounter Date: 5/13/2025    Therapy Diagnosis:   Encounter Diagnoses   Name Primary?    Alteration in performance of activities of daily living Yes    Alteration in instrumental activities of daily living (IADL)     Unspecified lack of coordination     Muscle weakness of right upper extremity      Physician: Racquel Lazcano MD    Physician Orders: Eval and Treat  Medical Diagnosis: Cerebrovascular accident (CVA) due to embolism of left middle cerebral artery    Visit # / Visits Authorized: 15 / 20  Insurance Authorization Period: 3/6/2025 to 12/31/2025  Date of Evaluation: 2/27/2025   Plan of Care Certification: 5/13/2025 to 7/24/2025     Time In: 0936   Time Out: 1018  Total Time (in minutes): 42   Total Billable Time (in minutes): 42        Subjective   Pt. reported suffering from x2 falls recently, one when stepping back onto obstacle when assisting elderly mother into the car, and the other 2/2 not clearing RLE when stepping onto top step of staircase. Pt. reports increased effort to pick R foot up and change between break/accelerator while driving. He also reports his R hand remains not as malleable as compared to L hand.         Objective    RUE AROM: 4/4 all planes excluding R shoulder IR and supination >3/4 AROM  BUE MMT 5/5; pt. Reports feeling weaker on R side compared to left          Treatment:  Therapeutic Activity  TA 1: OT and pt. discussed pt's current fxnl performance within the home & community, progress/barriers to progress, and goals/objective measures completed for OT POC update. Pt. endorsed x2 recent falls, one when stepping back onto an obstacle after assisting his elderly mother into the car in the rain, and the other 2/2 not clearing R foot fully when ascending final step of staircase in home; pt. denied any injuries resulting from falls  but continues to report ongoing balance and R side attention/awareness deficits. OT discussed plan to continue to address ongoing R side proprioception/kinesthetic mvmt awareness, coordination, motor planning, motor control, endurance, interlimb coordination, tissue length imbalances, balance, visuospatial/visual perceptual skills, executive functioning, and safety awareness deficits in order to improve fxnl use of dominant RUE and increase safety, independence & efficiency with all ADLs/IADLs, , and work related tasks. See objective, assessment, and goals for further findings.  TA 2: Functional task recording form completed: cutting with knife 1/7 ; shoe laces 1/7; bathroom (using dominant RUE) 2/7; handwriting 2/7; texting 3/7 (slower and less accurate with pecking keys); standing to don LB clothing 2/7    TA 3: Typing speed assessed, x1 1-min trial, 12 wpm, 90% accuracy; x3 3-minute typing trials completed with the following outcomes: 15 wpm, 95% accuracy; 13 wpm, 94% accuracy; 11 wpm, 93% accuracy; OT educated pt. on continuing to practice typing rather and completing in functional circumstances rather than avoiding the task if he wishes to continue to improve coordination, speed, and accuracy with task      Time Entry(in minutes):  Therapeutic Activity Time Entry: 42    Assessment & Plan   Assessment  Peter presents with a condition of Moderate complexity.           ADL Limitations : Dressing, Functional mobility, Feeding, Personal hygiene and grooming, Toileting and toilet hygiene  IADL Limitations: Health management and maintenance, Home establishment and management, Meal preparation and cleanup, Safety and emergency maintenance, Financial management, Grocery/shopping, Driving, Care of others, Community mobility  Work Limitations: Job performance  Functional Limitations: Activity tolerance, Fine motor coordination, Functional cognition, Functional mobility, Increased risk of fall, Maintaining  balance, Manipulating objects, Proprioception, Reaching                    Prognosis: Good  Assessment Details: Mr. Munoz has made good progress towards meeting OT goals during this POC. At this time, pt. Has met 2 STGs and 3 LTGs for improved RUE MMT, R FM coordination, handwriting legibility, and independence/ease with LB dressing in standing. Pt. Reports improved fxnl use of RUE, stating greater ease with performing bathing, dressing, toilet hygiene, cutting foods, and IADLs and work related tasks requiring handwriting. Mr. Munoz has responded well to and benefited from skilled tx interventions incorporating neuromuscular re-education, tissue lengthening, proprioceptive/kinesthetic movement awareness,  sequencing/problem solving, and FM skills training to address RUE sensorimotor, mm tightness/joint stiffness, motor planning, and higher level cognitive deficits limiting fxnl use of dominant RUE and overall daily occupational performance. OT has been able to progress tx interventions thru incorporation of dynamic surfaces, multi-sequence, visuospatial/visual perceptual, and/or divided attention components as pt. demonstrates improvements in performance skills/patterns. Mr. Munoz also demonstrates improved awareness of presenting deficits throughout this time, whereas pt. demonstrated decreased insight to deficits and was limiting use of RUE throughout daily occupational performance at time of initial evaluation. Despite the gains made during this time, Mr. Munoz continues to exhibit deficits related to global tissue length imbalances, interlimb coordination, R side proprioception, R in hand manipulation & FM skills, motor planning, dynamic standing balance, fxnl at tolerance, visuospatial and visual perceptual skills, memory, sequencing, divided attention, processing, problem solving, and safety awareness, all of which continue to pose pt. At increased risk for falls and further debility and decrease  his safety, independence & efficiency with all daily occupations. Skilled, neuro specific OT services continue to be warranted as medically necessary to address above mentioned deficits in order to maximize functional safety and independence and improve overall QoL.    Plan  From an occupational therapy perspective, the patient would benefit from: Skilled Rehab Services    Planned therapy interventions include: Therapeutic exercise, Therapeutic activities, Neuromuscular re-education, Manual therapy, ADLs/IADLs, and Cognitive functional training.            Visit Frequency: 2 times Per Week for 10 Weeks.       This plan was discussed with Patient.   Discussion participants: Agreed Upon Plan of Care  Plan details: OT plan to continue 2x/week to address RUE sensorimotor, interlimb coordination, dynamic standing balance, global joint stiffness/mm tightness, fxnl ax tolerance, visuospatial/visual perceptual, and executive functioning deficits to reduce risk of falls, further debility, and secondary musculoskeletal damage/impairments, increase safety, independence, and efficiency with ADLs, IADLs, work, and  roles/responsibilities, and improve overall QoL.          Patient will continue to benefit from skilled outpatient occupational therapy to address the deficits listed in the problem list box on initial evaluation, provide pt/family education and to maximize pt's level of independence in the home and community environment.     Patient's spiritual, cultural, and educational needs considered and patient agreeable to plan of care and goals.          Goals:   Active       Long Term Goals       Pt. will increase AROM in all deficits areas of RUE. (Progressing)       Start:  02/27/25 2/27/25: >/= 3/4 R shoulder extension, IR, supination, & wrist extension  4/16/25: >3/4 IR with palms touching lower back, supination, and wrist extension    5/13/25: >3/4 IR with palms touching back and supination         Pt.  will increase RUE MMT to 5/5 in all deficit areas for improved fxnl use of dominant RUE throughout daily occupational performance.  (Met)       Start:  02/27/25    Resolved:  05/15/25    2/27/25: 4/5 RUE IR, ER, sh ext, sh flexion, supination, wrist ext  4/16/25: 4/5 - 5/5  5/13/25: 5/5         Pt. will be able to wipe bottom following toileting using dominant RUE. (Progressing)       Start:  02/27/25 2/27/25: Not able to wipe with RUE  4/16/25: Not able to wipe with RUE  5/13/25: Improving          Pt. will demonstrate improved FM coordination as evidence by completing Grooved Peg Board in </= 2:45 mins. (Met)       Start:  02/27/25    Resolved:  04/16/25 2/27/25: 3:23 mins  4/16/25: 2:14 mins         Pt. will improve handwriting legibility and maintain consistency of legibility to Fair+ for increased independence with legal, financial & medical mgmt. (Progressing)       Start:  02/27/25 2/27/25: Poor, not consistent as writing progresses to R and down page  4/16/25: Fair  5/13/25: Fair/+            Long Term Goals (Cont'd)       Pt. will improve R side proprioception to >/= Fair+ for reduced risk of falls and improved fxnl use of dominant R side throughout daily occupational performance. (Progressing)       Start:  02/27/25 2/27/25: Fair-/Fair  4/16/25: Fair  5/13/25: Fair         Pt. will perform global stretching HEP Ind. (Ongoing)       Start:  02/27/25 4/16/25: Initiated         Pt. will perform RUE strengthening HEP Ind. (Ongoing)       Start:  02/27/25               Long Term Goals (Cont'd)       Pt. will increase typing speed to >/= 22 wpm with >/=90% accuracy for 3 min typing duration to increase independence & efficiency with work requirements.  (Progressing)       Start:  03/06/25       3/6/25: 10 wpm, 91% accuracy  4/16/25: NT  5/13/25:          Pt. will increase ease & efficiency donning clothing over RUE as evidence by rating task </= 1/7 on FTRF. (Progressing)       Start:   03/06/25       3/6/25: 3/7  4/16/25: 3/7         Pt. will increase efficiency & safety donning pants/shorts in standing as evidence by rating task </= 3/7 on FTRF. (Met)       Start:  03/06/25    Resolved:  04/16/25    3/6/25: 4/7  4/16/25: 3/7            Short Term Goals       Pt. will demonstrate improved FM coordination as evidence by completing Grooved Peg Board in </= 3:00 mins. (Met)       Start:  02/27/25    Resolved:  04/16/25 2/27/25: 3:23 mins  4/16/25: 2:14 mins         Pt. will improve handwriting legibility and maintain consistency of legibility to Fair for increased independence with legal, financial & medical mgmt. (Met)       Start:  02/27/25    Resolved:  04/16/25 2/27/25: Poor, not consistent as writing progresses to R and down page  4/16/25: Fair         OT will provide training/education on global stretching HEP to promote tissue lengthening necessary for improved postural control, fxnl mobility, & fxnl performance throughout ADLs/IADLs. (Ongoing)       Start:  02/27/25       Initiated          TBA Functional Task Recording Form with accompanying goal(s) to follow as needed. (Met)       Start:  02/27/25    Resolved:  03/06/25    Opening containers/bottles, cutting with knife, putting R arm thru shirt/jacket sleeve, wiping bottom with RUE, phone/computer use         OT will provide training/education on RUE proximal strengthening/stability HEP. (Ongoing)       Start:  02/27/25               Short Term Goals (Cont'd)       Pt. will increase typing speed to >/= 17 wpm with >/= 90% accuracy for 3 min typing duration to increase independence & efficiency with work requirements.  (Progressing)       Start:  03/06/25       3/6/25: 10 wpm, 91% accuracy  4/16/25: NT  15 wpm, 95% acc (3 min trial),; 13 wpm 94%; wpm 11, 93%         Pt. will increase ease and independence with wiping bottom using dominant RUE as evidence by scoring task </= 2/7 on FTRF. (Progressing)       Start:  03/06/25        3/6/25: 4/7 4/16/25: 5/7             Sukumar Holt, OT

## 2025-05-20 ENCOUNTER — CLINICAL SUPPORT (OUTPATIENT)
Dept: REHABILITATION | Facility: HOSPITAL | Age: 70
End: 2025-05-20
Payer: MEDICARE

## 2025-05-20 DIAGNOSIS — M62.81 MUSCLE WEAKNESS OF RIGHT UPPER EXTREMITY: ICD-10-CM

## 2025-05-20 DIAGNOSIS — Z78.9 ALTERATION IN INSTRUMENTAL ACTIVITIES OF DAILY LIVING (IADL): ICD-10-CM

## 2025-05-20 DIAGNOSIS — Z74.09 IMPAIRED FUNCTIONAL MOBILITY, BALANCE, GAIT, AND ENDURANCE: Primary | ICD-10-CM

## 2025-05-20 DIAGNOSIS — R27.9 UNSPECIFIED LACK OF COORDINATION: ICD-10-CM

## 2025-05-20 DIAGNOSIS — Z78.9 ALTERATION IN PERFORMANCE OF ACTIVITIES OF DAILY LIVING: Primary | ICD-10-CM

## 2025-05-20 PROCEDURE — 97112 NEUROMUSCULAR REEDUCATION: CPT | Mod: PO

## 2025-05-20 PROCEDURE — 97530 THERAPEUTIC ACTIVITIES: CPT | Mod: PO

## 2025-05-20 PROCEDURE — 97110 THERAPEUTIC EXERCISES: CPT | Mod: PO

## 2025-05-20 NOTE — PROGRESS NOTES
Outpatient Rehab    Physical Therapy Visit    Patient Name: Peter Munoz  MRN: [id number]  YOB: 1955  Encounter Date: 5/20/2025    Therapy Diagnosis:   Encounter Diagnosis   Name Primary?    Impaired functional mobility, balance, gait, and endurance Yes     Physician: Racquel Lazcano MD    Physician Orders: Eval and Treat  Medical Diagnosis: Cerebrovascular accident (CVA) due to embolism of left middle cerebral artery    Visit # / Visits Authorized:  14 / 20  Insurance Authorization Period: 2/27/2025 to 12/31/2025  Date of Evaluation: 2/27/2025  Plan of Care Certification: 4/25/2025 to 6/20/2025      PT/PTA:     Number of PTA visits since last PT visit:   Time In: 0845   Time Out: 0930  Total Time (in minutes): 45   Total Billable Time (in minutes):  45 minutes    FOTO:  Intake Score:  %  Survey Score 2:  %  Survey Score 3:  %    Precautions:       Subjective   that he is feeling nauseous this morning and he had one fall recently.  Pain reported as 0/10.      Objective        Vitals seated at beginning of session: 153/93 mmhg  HR 56 BPM     Treatment:  Therapeutic Exercise  TE 1: 10 minutes SCIFIT seated elliptical for CV endurance and LE strength, level 4.0  Balance/Neuromuscular Re-Education  NMR 1: 60 seconds anterior/ post weight shift on two point wooden fitter board, CGA, no UE support  NMR 2: 60 seconds left/right weight shift on two point wooden fitter board, CGA, no UE support  NMR 4: 10 reps BLE leading double large cone tapping, SBA, no UE support  NMR 5: 2 x 30 seconds split stance on hard side of BOSU, eyes open, CGA  Therapeutic Activity  TA 1: 2 x 10 reps step up to 6 inch step, BUE support    Time Entry(in minutes):  Neuromuscular Re-Education Time Entry: 20  Therapeutic Activity Time Entry: 15  Therapeutic Exercise Time Entry: 10    Assessment & Plan   Assessment: Peter tolerated today's session fairly. Patient requires increased rest breaks due to nausea throughout session. BP  assessed at beginning of session and WNL. The patient was educated on the importance of estabilishing care with PCP for proper medication management and address general health concerns including nausea. The patient would benefit from continued PT intervention to address remaining functional mobility deficits.  Evaluation/Treatment Tolerance: Treatment limited secondary to medical complications (Comment)    Patient will continue to benefit from skilled outpatient physical therapy to address the deficits listed in the problem list box on initial evaluation, provide pt/family education and to maximize pt's level of independence in the home and community environment.     Patient's spiritual, cultural, and educational needs considered and patient agreeable to plan of care and goals.           Plan: focus on dynamic balance, strength and endurance training as tolerated    Goals:   Active       long term goals        Patient to improve right hip flexion strength MMT score by 2/3  (Progressing)       Start:  02/27/25    Expected End:  04/24/25            Patient to improve FGA score to 25/30 (Progressing)       Start:  02/27/25    Expected End:  04/24/25            Patient to improve 6 MWT distance to 1450 feet  (Progressing)       Start:  02/27/25    Expected End:  04/24/25            patient to improve BLE SLS time to 5 seconds  (Progressing)       Start:  02/27/25    Expected End:  04/24/25              Resolved       Short term goals        Patient to be independent with established home exercise program  (Met)       Start:  02/27/25    Expected End:  03/27/25    Resolved:  04/25/25         Patient to improve hip flexion MMT by 1/3  (Met)       Start:  02/27/25    Expected End:  03/27/25    Resolved:  04/25/25         Patient to improve right  knee flexion MMT by 1/3 (Met)       Start:  02/27/25    Expected End:  03/27/25    Resolved:  04/25/25         Patient to improve FGA score to 21/30 (Met)       Start:  02/27/25     Expected End:  03/27/25    Resolved:  04/25/25             Josette Murphy, PT

## 2025-05-21 NOTE — PROGRESS NOTES
Outpatient Rehab    Physical Therapy Visit    Patient Name: Peter Munoz  MRN: [id number]  YOB: 1955  Encounter Date: 5/22/2025    Therapy Diagnosis:   Encounter Diagnosis   Name Primary?    Impaired functional mobility, balance, gait, and endurance Yes     Physician: Racquel Lazcano MD    Physician Orders: Eval and Treat  Medical Diagnosis: Cerebrovascular accident (CVA) due to embolism of left middle cerebral artery    Visit # / Visits Authorized:  15 / 20  Insurance Authorization Period: 2/27/2025 to 12/31/2025  Date of Evaluation: 2/27/2025  Plan of Care Certification: 4/25/2025 to 6/20/2025      PT/PTA:     Number of PTA visits since last PT visit:   Time In: 1032   Time Out: 1115  Total Time (in minutes): 43   Total Billable Time (in minutes):  43 minutes     FOTO:  Intake Score:  %  Survey Score 2:  %  Survey Score 3:  %    Precautions:   Standard     Subjective   that he is feeling a little better this morning, made an appointment for PCP to estabilish care and review medications.  Pain reported as 0/10.      Objective        None     Treatment:  Therapeutic Exercise  TE 1: 10 minutes SCIFIT seated elliptical for CV endurance and LE strength, level 5.0  TE 2: 3  x 10 reps bird dogs  Therapeutic Activity  TA 1: 3 x 10 reps sit to stands from mat while standing on foam fitter, CGA  TA 2: 2 x 10 reps step up to foam fitter  with oppsite leg hike, cues to slow down  TA 3: 2 x 100 feet ambulation with 2.2# ball toss to/from tech, CGA  TA 5: multiple seated rest breaks throughout session  TA 7: 6 laps forward reciprocal stepping over 6 large hurdles with no UE support, CGA  TA 8: 4 laps side stepping over 6 large hurdles with no UE support, CGA    Time Entry(in minutes):  Therapeutic Activity Time Entry: 28  Therapeutic Exercise Time Entry: 15    Assessment & Plan   Assessment: Peter tolerated today's session very well this morning. The patient reports decreased nausea throughout session and was  therefore able to tolerate higher intensity exercises throughout session. The patient requires minimal BUE support throughout session and only occ seated rest breaks. The patient would benefit from continued PT intervention to address remaining functional mobilty deficits and prevent decline in mobility.  Evaluation/Treatment Tolerance: Patient tolerated treatment well    Patient will continue to benefit from skilled outpatient physical therapy to address the deficits listed in the problem list box on initial evaluation, provide pt/family education and to maximize pt's level of independence in the home and community environment.     Patient's spiritual, cultural, and educational needs considered and patient agreeable to plan of care and goals.           Plan: focus on dynamic balance, strength and endurance training as tolerated    Goals:   Active       long term goals        Patient to improve right hip flexion strength MMT score by 2/3  (Progressing)       Start:  02/27/25    Expected End:  04/24/25            Patient to improve FGA score to 25/30 (Progressing)       Start:  02/27/25    Expected End:  04/24/25            Patient to improve 6 MWT distance to 1450 feet  (Progressing)       Start:  02/27/25    Expected End:  04/24/25            patient to improve BLE SLS time to 5 seconds  (Progressing)       Start:  02/27/25    Expected End:  04/24/25              Resolved       Short term goals        Patient to be independent with established home exercise program  (Met)       Start:  02/27/25    Expected End:  03/27/25    Resolved:  04/25/25         Patient to improve hip flexion MMT by 1/3  (Met)       Start:  02/27/25    Expected End:  03/27/25    Resolved:  04/25/25         Patient to improve right  knee flexion MMT by 1/3 (Met)       Start:  02/27/25    Expected End:  03/27/25    Resolved:  04/25/25         Patient to improve FGA score to 21/30 (Met)       Start:  02/27/25    Expected End:  03/27/25     Resolved:  04/25/25             Josette Murphy, PT

## 2025-05-22 ENCOUNTER — CLINICAL SUPPORT (OUTPATIENT)
Dept: REHABILITATION | Facility: HOSPITAL | Age: 70
End: 2025-05-22
Payer: MEDICARE

## 2025-05-22 ENCOUNTER — OFFICE VISIT (OUTPATIENT)
Dept: INTERNAL MEDICINE | Facility: CLINIC | Age: 70
End: 2025-05-22
Payer: MEDICARE

## 2025-05-22 VITALS
OXYGEN SATURATION: 97 % | HEIGHT: 67 IN | DIASTOLIC BLOOD PRESSURE: 93 MMHG | SYSTOLIC BLOOD PRESSURE: 158 MMHG | HEART RATE: 72 BPM | BODY MASS INDEX: 29.66 KG/M2 | WEIGHT: 189 LBS

## 2025-05-22 DIAGNOSIS — I10 PRIMARY HYPERTENSION: ICD-10-CM

## 2025-05-22 DIAGNOSIS — Z74.09 IMPAIRED FUNCTIONAL MOBILITY, BALANCE, GAIT, AND ENDURANCE: Primary | ICD-10-CM

## 2025-05-22 DIAGNOSIS — E78.5 HYPERLIPIDEMIA, UNSPECIFIED HYPERLIPIDEMIA TYPE: ICD-10-CM

## 2025-05-22 DIAGNOSIS — M62.81 MUSCLE WEAKNESS OF RIGHT UPPER EXTREMITY: ICD-10-CM

## 2025-05-22 DIAGNOSIS — Z00.00 ANNUAL PHYSICAL EXAM: Primary | ICD-10-CM

## 2025-05-22 DIAGNOSIS — Z78.9 ALTERATION IN INSTRUMENTAL ACTIVITIES OF DAILY LIVING (IADL): ICD-10-CM

## 2025-05-22 DIAGNOSIS — R27.9 UNSPECIFIED LACK OF COORDINATION: ICD-10-CM

## 2025-05-22 DIAGNOSIS — Z13.1 DIABETES MELLITUS SCREENING: ICD-10-CM

## 2025-05-22 DIAGNOSIS — Z78.9 ALTERATION IN PERFORMANCE OF ACTIVITIES OF DAILY LIVING: Primary | ICD-10-CM

## 2025-05-22 PROCEDURE — 97110 THERAPEUTIC EXERCISES: CPT | Mod: PO

## 2025-05-22 PROCEDURE — 99213 OFFICE O/P EST LOW 20 MIN: CPT | Mod: PBBFAC

## 2025-05-22 PROCEDURE — 97530 THERAPEUTIC ACTIVITIES: CPT | Mod: PO

## 2025-05-22 PROCEDURE — 99999 PR PBB SHADOW E&M-EST. PATIENT-LVL III: CPT | Mod: PBBFAC,,,

## 2025-05-22 PROCEDURE — 97112 NEUROMUSCULAR REEDUCATION: CPT | Mod: PO

## 2025-05-22 NOTE — PROGRESS NOTES
Outpatient Rehab    Occupational Therapy Visit    Patient Name: Peter Munoz  MRN: [id number]  YOB: 1955  Encounter Date: 5/22/2025    Therapy Diagnosis:   Encounter Diagnoses   Name Primary?    Alteration in performance of activities of daily living Yes    Alteration in instrumental activities of daily living (IADL)     Unspecified lack of coordination     Muscle weakness of right upper extremity      Physician: Racquel Lazcano MD    Physician Orders: Eval and Treat  Medical Diagnosis: Cerebrovascular accident (CVA) due to embolism of left middle cerebral artery    Visit # / Visits Authorized: 17 / 20  Insurance Authorization Period: 3/6/2025 to 12/31/2025  Date of Evaluation: 2/27/2025  Plan of Care Certification: 5/13/2025 to 7/24/2025      Time In: 1120   Time Out: 1200  Total Time (in minutes): 40   Total Billable Time (in minutes): 40           Subjective   Pt. reported scheduling PCP appt. for this afternoon. He requested to work on handwriting at start of tx session..           Treatment:  Therapeutic Activity  TA 1: Handwriting practice with pt. replicating pre-writing strokes, x3 reps each loops, peaks and pits, and dashes, good maintenance of sizing and space; pt. then performed several reps of signing signature, initially micrographia noted when progressing to R of page with verbal cues for amplitude/effort and use of visual targets to maintain size and spacing, improved legibility and consistency with progressive reps. Discussed importance of taking time and maintaining effort and attention for best performance during handwriting  TA 2: Seated at table top, bimanual coordination task requiring in hand manipulation including shifting, translation, stabilization, and distal rotation, while participating in cognitive task with working memory, sequencing, divided attention, visual motor integration, and reaction speed components, delayed processing and working memory however improved  overall with repetition  Balance/Neuromuscular Re-Education  NMR 1: VMI task with bimanual integration to exchange balls between hands while reaching in front of chest, over head and behind back with random verbal direction to place ball(s) onto target(s) on table top with progressing task complexity to include selective attention component with opposing verbal cue for visual target, 90% accuracy, increased time for processing with slight improvement as task duration progressed.    Time Entry(in minutes):  Neuromuscular Re-Education Time Entry: 15  Therapeutic Activity Time Entry: 25    Assessment & Plan   Assessment: Pt. reported good compliance with therapist's recommendations to establish care with PCP, per report of having PCP appt. scheduled for this afternoon. Pt. continues to respond well to and benefit from skilled cueing and mass repetitive practice of tx interventions with cognitive, motor, and vision components to promote motor learning and skill acquisition.       The patient will continue to benefit from skilled outpatient occupational therapy in order to address the deficits listed in the problem list on the initial evaluation, provide patient and family education, and maximize the patients level of independence in the home and community environments.     The patient's spiritual, cultural, and educational needs were considered, and the patient is agreeable to the plan of care and goals.           Plan: OT plan to continue 2x/week to address RUE sensorimotor, proprioceptive/kinesthetic movement awareness, global joint stiffness/mm tightness, balance, fxnl ax tolerance, and executive functioning deficits to reduce risk of falls, further debility, and secondary musculoskeletal damage/impairments, increase safety, independence, and efficiency with ADLs, IADLs, work, and  roles/responsibilities.    Goals:   Active       Long Term Goals       Pt. will increase AROM in all deficits areas of RUE.  (Ongoing)       Start:  02/27/25 2/27/25: >/= 3/4 R shoulder extension, IR, supination, & wrist extension  4/16/25: >3/4 IR with palms touching lower back, supination, and wrist extension    5/13/25: >3/4 IR with palms touching back and supination         Pt. will increase RUE MMT to 5/5 in all deficit areas for improved fxnl use of dominant RUE throughout daily occupational performance.  (Met)       Start:  02/27/25    Resolved:  05/15/25    2/27/25: 4/5 RUE IR, ER, sh ext, sh flexion, supination, wrist ext  4/16/25: 4/5 - 5/5 5/13/25: 5/5         Pt. will be able to wipe bottom following toileting using dominant RUE. (Ongoing)       Start:  02/27/25 2/27/25: Not able to wipe with RUE  4/16/25: Not able to wipe with RUE  5/13/25: Improving          Pt. will demonstrate improved FM coordination as evidence by completing Grooved Peg Board in </= 2:45 mins. (Met)       Start:  02/27/25    Resolved:  04/16/25 2/27/25: 3:23 mins  4/16/25: 2:14 mins         Pt. will improve handwriting legibility and maintain consistency of legibility to Fair+ for increased independence with legal, financial & medical mgmt. (Ongoing)       Start:  02/27/25 2/27/25: Poor, not consistent as writing progresses to R and down page  4/16/25: Fair  5/13/25: Fair/+            Long Term Goals (Cont'd)       Pt. will improve R side proprioception to >/= Fair+ for reduced risk of falls and improved fxnl use of dominant R side throughout daily occupational performance. (Ongoing)       Start:  02/27/25 2/27/25: Fair-/Fair  4/16/25: Fair  5/13/25: Fair         Pt. will perform global stretching HEP Ind. (Ongoing)       Start:  02/27/25 4/16/25: Initiated         Pt. will perform RUE strengthening HEP Ind. (Ongoing)       Start:  02/27/25               Long Term Goals (Cont'd)       Pt. will increase typing speed to >/= 22 wpm with >/=90% accuracy for 3 min typing duration to increase independence & efficiency with  work requirements.  (Ongoing)       Start:  03/06/25       3/6/25: 10 wpm, 91% accuracy  4/16/25: NT  5/13/25:          Pt. will increase ease & efficiency donning clothing over RUE as evidence by rating task </= 1/7 on FTRF. (Ongoing)       Start:  03/06/25       3/6/25: 3/7  4/16/25: 3/7         Pt. will increase efficiency & safety donning pants/shorts in standing as evidence by rating task </= 3/7 on FTRF. (Met)       Start:  03/06/25    Resolved:  04/16/25    3/6/25: 4/7 4/16/25: 3/7            Short Term Goals       Pt. will demonstrate improved FM coordination as evidence by completing Grooved Peg Board in </= 3:00 mins. (Met)       Start:  02/27/25    Resolved:  04/16/25 2/27/25: 3:23 mins  4/16/25: 2:14 mins         Pt. will improve handwriting legibility and maintain consistency of legibility to Fair for increased independence with legal, financial & medical mgmt. (Met)       Start:  02/27/25    Resolved:  04/16/25 2/27/25: Poor, not consistent as writing progresses to R and down page  4/16/25: Fair         OT will provide training/education on global stretching HEP to promote tissue lengthening necessary for improved postural control, fxnl mobility, & fxnl performance throughout ADLs/IADLs. (Ongoing)       Start:  02/27/25       Initiated          TBA Functional Task Recording Form with accompanying goal(s) to follow as needed. (Met)       Start:  02/27/25    Resolved:  03/06/25    Opening containers/bottles, cutting with knife, putting R arm thru shirt/jacket sleeve, wiping bottom with RUE, phone/computer use         OT will provide training/education on RUE proximal strengthening/stability HEP. (Ongoing)       Start:  02/27/25               Short Term Goals (Cont'd)       Pt. will increase typing speed to >/= 17 wpm with >/= 90% accuracy for 3 min typing duration to increase independence & efficiency with work requirements.  (Ongoing)       Start:  03/06/25       3/6/25: 10 wpm, 91%  accuracy  4/16/25: NT  15 wpm, 95% acc (3 min trial),; 13 wpm 94%; wpm 11, 93%         Pt. will increase ease and independence with wiping bottom using dominant RUE as evidence by scoring task </= 2/7 on FTRF. (Ongoing)       Start:  03/06/25       3/6/25: 4/7 4/16/25: 5/7             Sukumar Holt OT

## 2025-05-22 NOTE — PROGRESS NOTES
History & Physical  [facility]      SUBJECTIVE:     History of Present Illness:  Patient is a 69 y.o. male presents to clinic to establish care. Current diagnoses include HTN, prostate CA, apical hypertrophic CMP, CVA, HOCM    He started Wegovy 3 weeks ago and reports losing 10 lbs. He experiences side effects including three episodes of violent vomiting occurring two weeks after initiation, along with nausea. He reports feeling fatigued, which may be related to the weight loss and vomiting episodes. He acknowledges inadequate daily water intake to compensate for fluid loss.    #Metastatic prostate Ca  Currently follows with [facility] urology, ADT,SLIM/PRED and XRT for metastatic prostate cancer.He has [id number]. No leakage, dysuria, hematuria or pyuria. He does have episodes of urgency.No bone pain.    #HOCM  He follows with cardiology and has ICD in place. No arrhythmia noted (Dx lead). On eliquis for CVA prophylaxis. He also follows with general cardiology.     #BARRETT  He has sleep apnea which is improving with weight loss. He reports waking up at 2 AM. His wife notes snoring which occasionally results in him sleeping in another room.      #Hx of CVA/MCA stroke  Follows with neurology. He has a history of stroke, which was discovered by his wife at 1:30 AM after being awakened by unusual noises, prompting immediate emergency services contact.     #HTN  He reports elevated blood pressure over the last couple months. Current antihypertensive medications include Telmisartan 80 mg and Coreg twice daily, with uncertainty regarding hydrochlorothiazide use. His pressures have been elevated at home.         Review of patient's allergies indicates:  No Known Allergies    Past Medical History:   Diagnosis Date    Cancer 2022    prostate    HLD (hyperlipidemia)     Hypertension     Sleep apnea      Past Surgical History:   Procedure Laterality Date    CEREBRAL ANGIOGRAM N/A 03/26/2023     "Procedure: ANGIOGRAM-CEREBRAL;  Surgeon: Jocelyne Surgeon;  Location: [facility];  Service: Anesthesiology;  Laterality: N/A;    INSERTION, ICD, SINGLE CHAMBER Left 11/30/2023    Procedure: Insertion, ICD, Single Chamber;  Surgeon: Juan Good MD;  Location: [facility] EP LAB;  Service: Cardiology;  Laterality: Left;  Hypertrophic CMP, Single ICD, BIO(DX ld), anes, HI, 3 Prep    PROSTATECTOMY       Family History   Problem Relation Name Age of Onset    Hypertension Brother       Social History[1]     OBJECTIVE:     Vital Signs (Most Recent)  Vitals:    05/22/25 1311   BP: (!) 158/93   Patient Position: Sitting   Pulse: 72   SpO2: 97%   Weight: 85.7 kg (189 lb)   Height: 5' 7" (1.702 m)         Physical Exam  Constitutional:       General: He is not in acute distress.     Appearance: Normal appearance. He is not toxic-appearing.   HENT:      Head: Normocephalic and atraumatic.      Right Ear: External ear normal.      Left Ear: External ear normal.      Nose: Nose normal.      Mouth/Throat:      Mouth: Mucous membranes are moist.   Eyes:      Extraocular Movements: Extraocular movements intact.   Cardiovascular:      Rate and Rhythm: Normal rate and regular rhythm.      Pulses: Normal pulses.      Heart sounds: Normal heart sounds.   Pulmonary:      Effort: Pulmonary effort is normal. No respiratory distress.   Abdominal:      General: Abdomen is flat.      Palpations: Abdomen is soft.      Tenderness: There is no abdominal tenderness.   Musculoskeletal:      Cervical back: Normal range of motion and neck supple.   Skin:     General: Skin is warm.      Findings: No bruising or erythema.   Neurological:      General: No focal deficit present.      Mental Status: He is alert.   Psychiatric:         Mood and Affect: Mood normal.           ASSESSMENT/PLAN:   69 y.o.male presents to clinic to establish care.     Reviewed history of HTN, cardiac changes, prostate cancer, and stroke.  Assessed side effects of recently started " Wegovy (semaglutide) for weight loss, noting significant weight loss of 10 lbs in 3 weeks.  Evaluated current BP management, considering addition or adjustment of medications due to elevated readings.  Considered enrolling in [facility] HTN program for closer monitoring and management.  Assessed left testicular pain, considering potential need for urology follow-up.    Enroll in [facility] hypertension program   Order lipid panel and diabetes screening at [facility] lab   Continue Telmisartan 80 mg and Coreg twice daily for now but verify if still taking hydrochlorothiazide as prescribed, if patient is taking HCTZ, will need further HTN optimization    Peter was seen today for establish care.    Diagnoses and all orders for this visit:    Annual physical exam    Primary hypertension  -     Hypertension Digital Medicine (HDMP) Enrollment Order    Hyperlipidemia, unspecified hyperlipidemia type  -     LIPID PANEL; Future    Diabetes mellitus screening  -     Hemoglobin A1C; Future        Follow-up: 3-6 months for follow up or PRN    This note was generated with the assistance of ambient listening technology. Verbal consent was obtained by the patient and accompanying visitor(s) for the recording of patient appointment to facilitate this note. I attest to having reviewed and edited the generated note for accuracy, though some syntax or spelling errors may persist. Please contact the author of this note for any clarification.      Bernard Kurtz MD  [facility]             [1]   Social History  Tobacco Use    Smoking status: Never   Substance Use Topics    Alcohol use: Yes     Alcohol/week: 18.0 standard drinks of alcohol     Types: 18 Drinks containing 0.5 oz of alcohol per week     Comment: 2-3 weekly    Drug use: Never

## 2025-05-22 NOTE — PROGRESS NOTES
Outpatient Rehab    Occupational Therapy Visit    Patient Name: Peter Munoz  MRN: [id number]  YOB: 1955  Encounter Date: 5/20/2025    Therapy Diagnosis:   Encounter Diagnoses   Name Primary?    Alteration in performance of activities of daily living Yes    Alteration in instrumental activities of daily living (IADL)     Unspecified lack of coordination     Muscle weakness of right upper extremity      Physician: Racquel Lazcano MD    Physician Orders: Eval and Treat  Medical Diagnosis: Cerebrovascular accident (CVA) due to embolism of left middle cerebral artery    Visit # / Visits Authorized: 17 / 20  Insurance Authorization Period: 3/6/2025 to 12/31/2025  Date of Evaluation: 2/27/2025  Plan of Care Certification: 5/13/2025 to 7/24/2025      Time In: 1036   Time Out: 1114  Total Time (in minutes): 38   Total Billable Time (in minutes): 38           Subjective   Pt. reporting nausea at end of tx session and proceeded to vomit. Pt. denied any other symptoms and believes reoccurring nausea/vomiting is due to new medication he is taking. OT educated pt. on taking medication with food, specifically with more sustenance and refraining from straight sugar consumption (i.e. bread with peanut butter/butter vs. fruit and yogurt)..           Treatment:  Therapeutic Activity  TA 1: Seated EOM, VMI task to replicate 2D to 3D design, extended time however no assistance required. Pt. then began attempting to solve visuospatial puzzle, requiring repetition of task directions, unable to finish d/t time constraint and pt. endorsing nausea with eventual emesis  TA 2: OT educated pt. on importance of consuming medication with foods providing greater sustenance (i.e. bread with peanut butter/butter rather than fruit and yogurt) and hinderance/barrier reoccurring symptoms of nausea and vomiting have on ability to participate in skilled, intensive tx interventions. OT continues to recommend pt. establish care with PCP  and report side effects new medication may potentially be causing. OT also recommended pt. continue to monitor symptoms remainder of day and seek medical attention if symptoms continue and/or worse. Pt. expressed verbal understanding to all education/recommendations.  Balance/Neuromuscular Re-Education  NMR 1: Seated EOM, interlimb coordination with selective attention, VMI, processing, and dynamic sitting balance components as pt. required to reach across midline to retrieve cone based on selective attention visual cue > reach arm over head while performing contralateral hip/knee flexion and place on ipsilateral UE side of mat, Fair/+ coordination and accuracy  NMR 2: To improve coordination, processing, attention, proprioception, motor planning, and reaction speed, VMI task to reach/grasp with RUE in various planes/directions to retrieve bean bag from elevated mat based on selective attention visual target, min v/c for forced use of R hand    Time Entry(in minutes):  Neuromuscular Re-Education Time Entry: 25  Therapeutic Activity Time Entry: 13    Assessment & Plan   Assessment: Intermittent v/c required for forced use of RUE during VMI task with reaction speed and selective attention component. Pt. became nauseous at end of tx session and monitored in clinic following episode of emesis. OT discussed barriers/hinderance of ongoing symptoms potentially from new medication limiting participation in skilled, intensive tx interventions, and continues to recommend pt. establish care with PCP and report ongoing symptoms/reoccurring vomiting to MD. Pt. denied any ongoing symptoms or issues following seated rest break and consuming water after emesis episode and expressed verbal understanding to all of OT's education/recommendations.       The patient will continue to benefit from skilled outpatient occupational therapy in order to address the deficits listed in the problem list on the initial evaluation, provide patient  and family education, and maximize the patients level of independence in the home and community environments.     The patient's spiritual, cultural, and educational needs were considered, and the patient is agreeable to the plan of care and goals.           Plan: OT plan to continue 2x/week to address RUE sensorimotor, proprioceptive/kinesthetic movement awareness, global joint stiffness/mm tightness, balance, fxnl ax tolerance, and executive functioning deficits to reduce risk of falls, further debility, and secondary musculoskeletal damage/impairments, increase safety, independence, and efficiency with ADLs, IADLs, work, and  roles/responsibilities    Goals:   Active       Long Term Goals       Pt. will increase AROM in all deficits areas of RUE. (Ongoing)       Start:  02/27/25 2/27/25: >/= 3/4 R shoulder extension, IR, supination, & wrist extension  4/16/25: >3/4 IR with palms touching lower back, supination, and wrist extension    5/13/25: >3/4 IR with palms touching back and supination         Pt. will increase RUE MMT to 5/5 in all deficit areas for improved fxnl use of dominant RUE throughout daily occupational performance.  (Met)       Start:  02/27/25    Resolved:  05/15/25    2/27/25: 4/5 RUE IR, ER, sh ext, sh flexion, supination, wrist ext  4/16/25: 4/5 - 5/5  5/13/25: 5/5         Pt. will be able to wipe bottom following toileting using dominant RUE. (Ongoing)       Start:  02/27/25 2/27/25: Not able to wipe with RUE  4/16/25: Not able to wipe with RUE  5/13/25: Improving          Pt. will demonstrate improved FM coordination as evidence by completing Grooved Peg Board in </= 2:45 mins. (Met)       Start:  02/27/25    Resolved:  04/16/25 2/27/25: 3:23 mins  4/16/25: 2:14 mins         Pt. will improve handwriting legibility and maintain consistency of legibility to Fair+ for increased independence with legal, financial & medical mgmt. (Ongoing)       Start:  02/27/25        2/27/25: Poor, not consistent as writing progresses to R and down page  4/16/25: Fair  5/13/25: Fair/+            Long Term Goals (Cont'd)       Pt. will improve R side proprioception to >/= Fair+ for reduced risk of falls and improved fxnl use of dominant R side throughout daily occupational performance. (Ongoing)       Start:  02/27/25 2/27/25: Fair-/Fair  4/16/25: Fair  5/13/25: Fair         Pt. will perform global stretching HEP Ind. (Ongoing)       Start:  02/27/25 4/16/25: Initiated         Pt. will perform RUE strengthening HEP Ind. (Ongoing)       Start:  02/27/25               Long Term Goals (Cont'd)       Pt. will increase typing speed to >/= 22 wpm with >/=90% accuracy for 3 min typing duration to increase independence & efficiency with work requirements.  (Ongoing)       Start:  03/06/25       3/6/25: 10 wpm, 91% accuracy  4/16/25: NT  5/13/25:          Pt. will increase ease & efficiency donning clothing over RUE as evidence by rating task </= 1/7 on FTRF. (Ongoing)       Start:  03/06/25       3/6/25: 3/7  4/16/25: 3/7         Pt. will increase efficiency & safety donning pants/shorts in standing as evidence by rating task </= 3/7 on FTRF. (Met)       Start:  03/06/25    Resolved:  04/16/25    3/6/25: 4/7  4/16/25: 3/7            Short Term Goals       Pt. will demonstrate improved FM coordination as evidence by completing Grooved Peg Board in </= 3:00 mins. (Met)       Start:  02/27/25    Resolved:  04/16/25 2/27/25: 3:23 mins  4/16/25: 2:14 mins         Pt. will improve handwriting legibility and maintain consistency of legibility to Fair for increased independence with legal, financial & medical mgmt. (Met)       Start:  02/27/25    Resolved:  04/16/25 2/27/25: Poor, not consistent as writing progresses to R and down page  4/16/25: Fair         OT will provide training/education on global stretching HEP to promote tissue lengthening necessary for improved postural control, fxnl  mobility, & fxnl performance throughout ADLs/IADLs. (Ongoing)       Start:  02/27/25       Initiated          TBA Functional Task Recording Form with accompanying goal(s) to follow as needed. (Met)       Start:  02/27/25    Resolved:  03/06/25    Opening containers/bottles, cutting with knife, putting R arm thru shirt/jacket sleeve, wiping bottom with RUE, phone/computer use         OT will provide training/education on RUE proximal strengthening/stability HEP. (Ongoing)       Start:  02/27/25               Short Term Goals (Cont'd)       Pt. will increase typing speed to >/= 17 wpm with >/= 90% accuracy for 3 min typing duration to increase independence & efficiency with work requirements.  (Ongoing)       Start:  03/06/25       3/6/25: 10 wpm, 91% accuracy  4/16/25: NT  15 wpm, 95% acc (3 min trial),; 13 wpm 94%; wpm 11, 93%         Pt. will increase ease and independence with wiping bottom using dominant RUE as evidence by scoring task </= 2/7 on FTRF. (Ongoing)       Start:  03/06/25       3/6/25: 4/7  4/16/25: 5/7             Sukumar Holt, OT

## 2025-05-27 ENCOUNTER — CLINICAL SUPPORT (OUTPATIENT)
Dept: REHABILITATION | Facility: HOSPITAL | Age: 70
End: 2025-05-27
Payer: MEDICARE

## 2025-05-27 DIAGNOSIS — Z78.9 ALTERATION IN INSTRUMENTAL ACTIVITIES OF DAILY LIVING (IADL): ICD-10-CM

## 2025-05-27 DIAGNOSIS — M62.81 MUSCLE WEAKNESS OF RIGHT UPPER EXTREMITY: ICD-10-CM

## 2025-05-27 DIAGNOSIS — Z78.9 ALTERATION IN PERFORMANCE OF ACTIVITIES OF DAILY LIVING: Primary | ICD-10-CM

## 2025-05-27 DIAGNOSIS — R27.9 UNSPECIFIED LACK OF COORDINATION: ICD-10-CM

## 2025-05-27 PROCEDURE — 97110 THERAPEUTIC EXERCISES: CPT | Mod: PO

## 2025-05-27 NOTE — PROGRESS NOTES
Outpatient Rehab    Occupational Therapy Visit    Patient Name: Peter Munoz  MRN: [id number]  YOB: 1955  Encounter Date: 5/27/2025    Therapy Diagnosis:   Encounter Diagnoses   Name Primary?    Alteration in performance of activities of daily living Yes    Alteration in instrumental activities of daily living (IADL)     Unspecified lack of coordination     Muscle weakness of right upper extremity      Physician: Racquel Lazcano MD    Physician Orders: Eval and Treat  Medical Diagnosis: Cerebrovascular accident (CVA) due to embolism of left middle cerebral artery    Visit # / Visits Authorized: 18 / 30  Insurance Authorization Period: 3/6/2025 to 12/31/2025  Date of Evaluation: 2/27/2025  Plan of Care Certification: 5/13/2025 to 7/24/2025      Time In: 1033   Time Out: 1114  Total Time (in minutes): 41   Total Billable Time (in minutes): 41        Subjective   Pt. reported throwing up this morning, planning to get nausea medicine from PCP.  Pt. reports wanting to work on UB strengthening..             Treatment:  Therapeutic Exercise  TE 1: UE bike at 3.5 level for 8 min duration switching directions midway  TE 2: Standing, BUE strengthening and endurance training, 3 sets of 30 sec pulses in B shoulder flexion, shoulder abduction, serve the platter, over head<>waist level shoulder abd/adduction, with 2# dumbbells; circumduction while alternating between horizontal abd/adduction with 1# dumbbells, seated rest breaks taken as needed, v/c for sustained core activation    Time Entry(in minutes):  Therapeutic Exercise Time Entry: 41    Assessment & Plan   Assessment: Pt. tolerated BUE strengthening and endurance training exercises well this date. OT educated pt. on ensuring compliance with self stretching HEP following strengthening exercises to reduce potential for soreness and promote tissue lengthening over active mm groups. OT also discussed plan to complete training on BUE Strengthening HEP pending  pt's response/tolerance of continued strength training.  Evaluation/Treatment Tolerance: Patient tolerated treatment well    The patient will continue to benefit from skilled outpatient occupational therapy in order to address the deficits listed in the problem list on the initial evaluation, provide patient and family education, and maximize the patients level of independence in the home and community environments.     The patient's spiritual, cultural, and educational needs were considered, and the patient is agreeable to the plan of care and goals.           Plan: OT plan to continue 2x/week to address RUE sensorimotor, proprioceptive/kinesthetic movement awareness, global joint stiffness/mm tightness, balance, fxnl ax tolerance, and executive functioning deficits to reduce risk of falls, further debility, and secondary musculoskeletal damage/impairments, increase safety, independence, and efficiency with ADLs, IADLs, work, and  roles/responsibilities.    Goals:   Active       Long Term Goals       Pt. will increase AROM in all deficits areas of RUE. (Ongoing)       Start:  02/27/25 2/27/25: >/= 3/4 R shoulder extension, IR, supination, & wrist extension  4/16/25: >3/4 IR with palms touching lower back, supination, and wrist extension    5/13/25: >3/4 IR with palms touching back and supination         Pt. will increase RUE MMT to 5/5 in all deficit areas for improved fxnl use of dominant RUE throughout daily occupational performance.  (Met)       Start:  02/27/25    Resolved:  05/15/25    2/27/25: 4/5 RUE IR, ER, sh ext, sh flexion, supination, wrist ext  4/16/25: 4/5 - 5/5  5/13/25: 5/5         Pt. will be able to wipe bottom following toileting using dominant RUE. (Ongoing)       Start:  02/27/25 2/27/25: Not able to wipe with RUE  4/16/25: Not able to wipe with RUE  5/13/25: Improving          Pt. will demonstrate improved FM coordination as evidence by completing Grooved Peg Board in  </= 2:45 mins. (Met)       Start:  02/27/25    Resolved:  04/16/25 2/27/25: 3:23 mins  4/16/25: 2:14 mins         Pt. will improve handwriting legibility and maintain consistency of legibility to Fair+ for increased independence with legal, financial & medical mgmt. (Ongoing)       Start:  02/27/25 2/27/25: Poor, not consistent as writing progresses to R and down page  4/16/25: Fair  5/13/25: Fair/+            Long Term Goals (Cont'd)       Pt. will improve R side proprioception to >/= Fair+ for reduced risk of falls and improved fxnl use of dominant R side throughout daily occupational performance. (Ongoing)       Start:  02/27/25 2/27/25: Fair-/Fair  4/16/25: Fair  5/13/25: Fair         Pt. will perform global stretching HEP Ind. (Ongoing)       Start:  02/27/25 4/16/25: Initiated         Pt. will perform RUE strengthening HEP Ind. (Ongoing)       Start:  02/27/25               Long Term Goals (Cont'd)       Pt. will increase typing speed to >/= 22 wpm with >/=90% accuracy for 3 min typing duration to increase independence & efficiency with work requirements.  (Ongoing)       Start:  03/06/25       3/6/25: 10 wpm, 91% accuracy  4/16/25: NT  5/13/25:          Pt. will increase ease & efficiency donning clothing over RUE as evidence by rating task </= 1/7 on FTRF. (Ongoing)       Start:  03/06/25       3/6/25: 3/7  4/16/25: 3/7         Pt. will increase efficiency & safety donning pants/shorts in standing as evidence by rating task </= 3/7 on FTRF. (Met)       Start:  03/06/25    Resolved:  04/16/25    3/6/25: 4/7  4/16/25: 3/7            Short Term Goals       Pt. will demonstrate improved FM coordination as evidence by completing Grooved Peg Board in </= 3:00 mins. (Met)       Start:  02/27/25    Resolved:  04/16/25 2/27/25: 3:23 mins  4/16/25: 2:14 mins         Pt. will improve handwriting legibility and maintain consistency of legibility to Fair for increased independence with legal,  financial & medical mgmt. (Met)       Start:  02/27/25    Resolved:  04/16/25 2/27/25: Poor, not consistent as writing progresses to R and down page  4/16/25: Fair         OT will provide training/education on global stretching HEP to promote tissue lengthening necessary for improved postural control, fxnl mobility, & fxnl performance throughout ADLs/IADLs. (Ongoing)       Start:  02/27/25       Initiated          TBA Functional Task Recording Form with accompanying goal(s) to follow as needed. (Met)       Start:  02/27/25    Resolved:  03/06/25    Opening containers/bottles, cutting with knife, putting R arm thru shirt/jacket sleeve, wiping bottom with RUE, phone/computer use         OT will provide training/education on RUE proximal strengthening/stability HEP. (Ongoing)       Start:  02/27/25               Short Term Goals (Cont'd)       Pt. will increase typing speed to >/= 17 wpm with >/= 90% accuracy for 3 min typing duration to increase independence & efficiency with work requirements.  (Ongoing)       Start:  03/06/25       3/6/25: 10 wpm, 91% accuracy  4/16/25: NT  15 wpm, 95% acc (3 min trial),; 13 wpm 94%; wpm 11, 93%         Pt. will increase ease and independence with wiping bottom using dominant RUE as evidence by scoring task </= 2/7 on FTRF. (Ongoing)       Start:  03/06/25       3/6/25: 4/7  4/16/25: 5/7             Sukumar Holt, OT

## 2025-05-28 ENCOUNTER — CLINICAL SUPPORT (OUTPATIENT)
Dept: CARDIOLOGY | Facility: HOSPITAL | Age: 70
End: 2025-05-28
Attending: INTERNAL MEDICINE
Payer: MEDICARE

## 2025-05-28 ENCOUNTER — CLINICAL SUPPORT (OUTPATIENT)
Dept: CARDIOLOGY | Facility: HOSPITAL | Age: 70
End: 2025-05-28
Payer: MEDICARE

## 2025-05-28 ENCOUNTER — CLINICAL SUPPORT (OUTPATIENT)
Dept: REHABILITATION | Facility: HOSPITAL | Age: 70
End: 2025-05-28
Payer: MEDICARE

## 2025-05-28 DIAGNOSIS — Z78.9 ALTERATION IN INSTRUMENTAL ACTIVITIES OF DAILY LIVING (IADL): ICD-10-CM

## 2025-05-28 DIAGNOSIS — Z74.09 IMPAIRED FUNCTIONAL MOBILITY, BALANCE, GAIT, AND ENDURANCE: ICD-10-CM

## 2025-05-28 DIAGNOSIS — I42.2 OTHER HYPERTROPHIC CARDIOMYOPATHY: ICD-10-CM

## 2025-05-28 DIAGNOSIS — Z78.9 ALTERATION IN PERFORMANCE OF ACTIVITIES OF DAILY LIVING: Primary | ICD-10-CM

## 2025-05-28 DIAGNOSIS — I10 PRIMARY HYPERTENSION: ICD-10-CM

## 2025-05-28 DIAGNOSIS — Z95.810 PRESENCE OF AUTOMATIC (IMPLANTABLE) CARDIAC DEFIBRILLATOR: ICD-10-CM

## 2025-05-28 DIAGNOSIS — R27.9 UNSPECIFIED LACK OF COORDINATION: ICD-10-CM

## 2025-05-28 DIAGNOSIS — M62.81 MUSCLE WEAKNESS OF RIGHT UPPER EXTREMITY: ICD-10-CM

## 2025-05-28 DIAGNOSIS — I63.412 CEREBROVASCULAR ACCIDENT (CVA) DUE TO EMBOLISM OF LEFT MIDDLE CEREBRAL ARTERY: ICD-10-CM

## 2025-05-28 PROCEDURE — 93295 DEV INTERROG REMOTE 1/2/MLT: CPT | Mod: ,,, | Performed by: INTERNAL MEDICINE

## 2025-05-28 PROCEDURE — 97110 THERAPEUTIC EXERCISES: CPT | Mod: PO,CQ

## 2025-05-28 PROCEDURE — 97530 THERAPEUTIC ACTIVITIES: CPT | Mod: PO,CQ

## 2025-05-28 NOTE — PROGRESS NOTES
Outpatient Rehab    Physical Therapy Visit    Patient Name: Peter Munoz  MRN: [id number]  YOB: 1955  Encounter Date: 5/28/2025    Therapy Diagnosis:   Encounter Diagnoses   Name Primary?    Alteration in performance of activities of daily living Yes    Alteration in instrumental activities of daily living (IADL)     Unspecified lack of coordination     Cerebrovascular accident (CVA) due to embolism of left middle cerebral artery     Impaired functional mobility, balance, gait, and endurance     Muscle weakness of right upper extremity          Physician: Rcaquel Lazcano MD    Physician Orders: Eval and Treat  Medical Diagnosis: Cerebrovascular accident (CVA) due to embolism of left middle cerebral artery    Visit # / Visits Authorized:  16 / 30  Insurance Authorization Period: 2/27/2025 to 12/31/2025  Date of Evaluation: 2/27/2025  Plan of Care Certification: 4/25/2025 to 6/20/2025      PT/PTA:     Number of PTA visits since last PT visit:   Time In: 1000   Time Out: 1045  Total Time (in minutes): 45   Total Billable Time (in minutes): 45 minutes     FOTO:  Intake Score:  %  Survey Score 2:  %  Survey Score 3:  %    Precautions:   Standard     Subjective   that he is feeling a better this morning. Has been expereincing side effect of nausea and emisis from a weight loss medication. No issues this morning though..    NA    Objective        None     Treatment:  Therapeutic Exercise  TE 1: 10 minutes SCIFIT seated elliptical for CV endurance and LE strength, level 5.0  TE 2: 3  x 10 reps bird dogs  Therapeutic Activity  TA 1: 3 x 10 reps sit to stands from mat while standing on foam fitter, CGA  TA 2: 2 x 10 reps step up to foam fitter  with oppsite leg hike, cues to slow down  TA 3: 2 x 100 feet ambulation with 2.2# ball toss to/from tech, CGA  TA 4: 2 x 100 feet ambulation with vertical head turns while holding tidal tank  TA 5: multiple seated rest breaks throughout session  TA 6: 2 x 100 feet  "ambulation with horizontal head turns while holding tidal tank  TA 7: 6 laps forward reciprocal stepping over 6 large 8" hurdles with no UE support, CGA  TA 8: 4 laps side stepping over 6 large 8"  hurdles with no UE support, CGA    Time Entry(in minutes):  Therapeutic Activity Time Entry: 30  Therapeutic Exercise Time Entry: 15    Assessment & Plan    Peter tolerated today's session very well this morning. The patient reports no  nausea throughout session.  The patient requires minimal BUE support throughout session and only occ seated rest breaks. The patient would benefit from continued PT intervention to address remaining functional mobilty deficits and prevent decline in mobility.       Patient will continue to benefit from skilled outpatient physical therapy to address the deficits listed in the problem list box on initial evaluation, provide pt/family education and to maximize pt's level of independence in the home and community environment.     Patient's spiritual, cultural, and educational needs considered and patient agreeable to plan of care and goals.           Plan: focus on dynamic balance, strength and endurance training as tolerated    Goals:   Active       long term goals        Patient to improve right hip flexion strength MMT score by 2/3  (Progressing)       Start:  02/27/25    Expected End:  04/24/25            Patient to improve FGA score to 25/30 (Progressing)       Start:  02/27/25    Expected End:  04/24/25            Patient to improve 6 MWT distance to 1450 feet  (Progressing)       Start:  02/27/25    Expected End:  04/24/25            patient to improve BLE SLS time to 5 seconds  (Progressing)       Start:  02/27/25    Expected End:  04/24/25              Resolved       Short term goals        Patient to be independent with established home exercise program  (Met)       Start:  02/27/25    Expected End:  03/27/25    Resolved:  04/25/25         Patient to improve hip flexion MMT by 1/3  " (Met)       Start:  02/27/25    Expected End:  03/27/25    Resolved:  04/25/25         Patient to improve right  knee flexion MMT by 1/3 (Met)       Start:  02/27/25    Expected End:  03/27/25    Resolved:  04/25/25         Patient to improve FGA score to 21/30 (Met)       Start:  02/27/25    Expected End:  03/27/25    Resolved:  04/25/25             Peter Kim PTA

## 2025-05-29 ENCOUNTER — CLINICAL SUPPORT (OUTPATIENT)
Dept: REHABILITATION | Facility: HOSPITAL | Age: 70
End: 2025-05-29
Payer: MEDICARE

## 2025-05-29 DIAGNOSIS — R27.9 UNSPECIFIED LACK OF COORDINATION: ICD-10-CM

## 2025-05-29 DIAGNOSIS — Z78.9 ALTERATION IN INSTRUMENTAL ACTIVITIES OF DAILY LIVING (IADL): ICD-10-CM

## 2025-05-29 DIAGNOSIS — Z78.9 ALTERATION IN PERFORMANCE OF ACTIVITIES OF DAILY LIVING: Primary | ICD-10-CM

## 2025-05-29 DIAGNOSIS — M62.81 MUSCLE WEAKNESS OF RIGHT UPPER EXTREMITY: ICD-10-CM

## 2025-05-29 LAB
OHS CV AF BURDEN PERCENT: < 1
OHS CV DC REMOTE DEVICE TYPE: NORMAL
OHS CV RV PACING PERCENT: 0 %

## 2025-05-29 PROCEDURE — 97110 THERAPEUTIC EXERCISES: CPT | Mod: PO

## 2025-05-29 RX ORDER — TELMISARTAN 80 MG/1
80 TABLET ORAL NIGHTLY
Qty: 90 TABLET | Refills: 3 | Status: SHIPPED | OUTPATIENT
Start: 2025-05-29

## 2025-05-29 NOTE — PROGRESS NOTES
Outpatient Rehab    Physical Therapy Progress Note    Patient Name: Peter Munoz  MRN: [id number]  YOB: 1955  Encounter Date: 5/30/2025    Therapy Diagnosis:   Encounter Diagnosis   Name Primary?    Impaired functional mobility, balance, gait, and endurance Yes     Physician: Racquel Lazcano MD    Physician Orders: Eval and Treat  Medical Diagnosis: Cerebrovascular accident (CVA) due to embolism of left middle cerebral artery    Visit # / Visits Authorized:  17 / 30  Insurance Authorization Period: 2/27/2025 to 12/31/2025  Date of Evaluation: 2/27/2025  Plan of Care Certification: 4/25/2025 to 6/20/2025      PT/PTA:     Number of PTA visits since last PT visit:   Time In: 1020   Time Out: 1105  Total Time (in minutes): 45   Total Billable Time (in minutes):  45 minutes     FOTO:  Intake Score:  %  Survey Score 2:  %  Survey Score 3:  %    Precautions:   Standard     Subjective   he is feeling good today, not nauseous.  Pain reported as 0/10.      Objective        Strength: manual muscle test grades below   Lower Extremity Strength  Right LE  eval  4/25/2025 5/30/2035 Left LE  eval  4/25/2025 5/30/2035   Hip Flexion: 4-/5 4+/5 5/5 Hip Flexion: 4-/5 4+/5 5/5   Hip Extension:  4/5 4+/5 5/5 Hip Extension: 4+/5 4+/5 5/5   Hip Abduction: 5/5 5/5 5/5 Hip Abduction: 5/5 5/5 5/5   Hip Adduction: 4/5 4+/5 5/5 Hip Adduction 4+/5 4+/5 5/5   Knee Extension: 5/5 5/5 5/5 Knee Extension: 5/5 5/5 5/5   Knee Flexion: 4/5 5/5 5/5 Knee Flexion: 4+/5 5/5 5/5   Ankle Dorsiflexion: 5/5 5/5 5/5 Ankle Dorsiflexion: 5/5 5/5 5/5   Ankle Plantarflexion: 5/5 5/5 5/5 Ankle Plantarflexion: 5/5 5/5 5/5           Evaluation 4/25/2025 5/30/2024   Single Limb Stance R LE 1 second  (<10 sec = HIGH FALL RISK) 4 seconds  8 seconds    Single Limb Stance L LE 1 second  (<10 sec = HIGH FALL RISK) 9 seconds  12 seconds         Evaluation 4/25/2025 5/30/2035   30 second Chair Rise  (adults > 59 y/o) 10 completed with no arms 16 completed  with no arms  22 completed with no arms    5 times sit-stand  (adults 18-65 y/o) 15 seconds with no UE support   >12 sec= fall risk for general elderly  >16 sec= fall risk for Parkinson's disease  >10 sec= balance/vestibular dysfunction (<59 y/o)  >14.2 sec= balance/vestibular dysfunction (>59 y/o)  >12 sec= fall risk for CVA 10 seconds  6 seconds                  Evaluation 4/25/2025 5/30/2035   6 MWT  1250 feet  1437 1649 feet   Self Selected Walking Speed 0.86 m/sec (6m/7s) 1.0 m/sec (6m/6s) 1.0 m/sec (6m/6s)   Fast Walking Speed 1.0 m/sec (6m/6s) 1.4 m/sec (6m/4s)  1.4 m/sec (6m/4s)          Functional Gait Assessment:   1. Gait on level surface =  3  2. Change in Gait Speed = 3  3. Gait with horizontal head turns  = 3  4. Gait with vertical head turns = 3  5. Gait with pivot turns = 3  6. Step over obstacle = 3  7. Gait with Narrow JOHN = 1  8. Gait with eyes closed = 3  9. Ambulating Backwards = 3  10. Steps = 3     Score 28/30   Score:   <22/30 fall risk   <20/30 fall risk in older adults   <18/30 fall risk in Parkinsons        Treatment:  Therapeutic Exercise  TE 1: 10 minutes ambulation on treadmill at 1.5 MPH at 5% incline, BUE support  Therapeutic Activity  TA 1: time for objective measures and FOTO  TA 2: time to discuss discharging early due to significnt progress with therapy and patient meeting all goals at this time.    Time Entry(in minutes):  Therapeutic Activity Time Entry: 35  Therapeutic Exercise Time Entry: 10    Assessment & Plan   Assessment: Peter tolerated today's reassessment session very well this morning. Peter shows significant improvements with all objective measures today. No remaining strength deficits noted based on MMT scores. THe patient excelled with 5 time sit to stand/ 30 second chair rise score, indicating good BLE strength and endurance. The patient's FGA score also improved and now places the patient outside of the elevated falls risk category. The patient was informed of his  progress and agreed to discharge early on June 9th. Plan to spend the remainder of session focusing on providing advanced HEP to better prepare the patient for upcoming discharge.  Evaluation/Treatment Tolerance: Patient tolerated treatment well    The patient will continue to benefit from skilled outpatient physical therapy in order to address the deficits listed in the problem list on the initial evaluation, provide patient and family education, and maximize the patients level of independence in the home and community environments.     The patient's spiritual, cultural, and educational needs were considered, and the patient is agreeable to the plan of care and goals.           Plan: plan to discharge on 6/9. Spend remaining sessions educating the patient on advanced HEP and preparing the patient for discharge    Goals:   Resolved       Short term goals        Patient to be independent with established home exercise program  (Met)       Start:  02/27/25    Expected End:  03/27/25    Resolved:  04/25/25         Patient to improve hip flexion MMT by 1/3  (Met)       Start:  02/27/25    Expected End:  03/27/25    Resolved:  04/25/25         Patient to improve right  knee flexion MMT by 1/3 (Met)       Start:  02/27/25    Expected End:  03/27/25    Resolved:  04/25/25         Patient to improve FGA score to 21/30 (Met)       Start:  02/27/25    Expected End:  03/27/25    Resolved:  04/25/25            long term goals        Patient to improve right hip flexion strength MMT score by 2/3  (Met)       Start:  02/27/25    Expected End:  04/24/25    Resolved:  05/30/25         Patient to improve FGA score to 25/30 (Met)       Start:  02/27/25    Expected End:  04/24/25    Resolved:  05/30/25         Patient to improve 6 MWT distance to 1450 feet  (Met)       Start:  02/27/25    Expected End:  04/24/25    Resolved:  05/30/25         patient to improve BLE SLS time to 5 seconds  (Met)       Start:  02/27/25    Expected End:   04/24/25    Resolved:  05/30/25             Josette Murphy, PT

## 2025-05-29 NOTE — PROGRESS NOTES
Outpatient Rehab    Occupational Therapy Visit    Patient Name: Peter Munoz  MRN: [id number]  YOB: 1955  Encounter Date: 5/29/2025    Therapy Diagnosis:   Encounter Diagnoses   Name Primary?    Alteration in performance of activities of daily living Yes    Alteration in instrumental activities of daily living (IADL)     Unspecified lack of coordination     Muscle weakness of right upper extremity      Physician: Racquel Lazcano MD    Physician Orders: Eval and Treat  Medical Diagnosis: Cerebrovascular accident (CVA) due to embolism of left middle cerebral artery    Visit # / Visits Authorized: 19 / 30  Insurance Authorization Period: 3/6/2025 to 12/31/2025  Date of Evaluation: 2/27/2025  Plan of Care Certification: 5/13/2025 to 7/24/2025      Time In: 1032   Time Out: 1111  Total Time (in minutes): 39   Total Billable Time (in minutes): 39      Subjective   Pt. requesting to start OT session with full body stretching 2/2 global mm tightness and legs feeling heavy while walking.             Treatment:  Therapeutic Exercise  TE 1: To reduce global mm tightness and joint stiffness, pt. completed various full body stretches including child's pose<>scorpion; thread the needle<>axial rotation with over head reach in quadruped; supine knee flexion with hip rotation; prone<>side lying with trunk rotation and thoracic extension, pt. endorsing increased dizziness and nausea with prone<>side lying, task discontinued and OT recommended pt. trial keeping eyes closed if wishing to attempt that stretch at home to potentially reduce VOR disturbance  TE 2: Standing, shoulder IR/ER stretches with towel over shoulder and behind lower back, several reps each UE  TE 3: Standing, pec stretch against doorway with UE positioned at 1/2 sh abduction and 1/2 elbow flexion; chest stretch in doorway with BUE's positioned in scaption plane along door frame, several reps each with sustained holds  TE 4: UE bike at 3.5 level of  intensity for 8 min duration, switching directions at 4 min antoine    Time Entry(in minutes):  Therapeutic Exercise Time Entry: 39    Assessment & Plan   Assessment: Pt. reported reduced mm tightness and improved mobility following series of full body, progressive stretches this date. Pt. did endorse beginning to feel dizzy and nauseous with prone>side lying movement during chest and trunk/hip rotation stretch, requiring discontinuing of symptom inducing movement. OT recommending pt. trial keeping eyes closed to reduce symptoms potentially triggered by VOR component if wishing to perform that stretch at home.       The patient will continue to benefit from skilled outpatient occupational therapy in order to address the deficits listed in the problem list on the initial evaluation, provide patient and family education, and maximize the patients level of independence in the home and community environments.     The patient's spiritual, cultural, and educational needs were considered, and the patient is agreeable to the plan of care and goals.           Plan: OT plan to continue 2x/week to address RUE sensorimotor, proprioceptive/kinesthetic movement awareness, global joint stiffness/mm tightness, balance, fxnl ax tolerance, and executive functioning deficits to reduce risk of falls, further debility, and secondary musculoskeletal damage/impairments, increase safety, independence, and efficiency with ADLs, IADLs, work, and  roles/responsibilities.    Goals:   Active       Long Term Goals       Pt. will increase AROM in all deficits areas of RUE. (Ongoing)       Start:  02/27/25 2/27/25: >/= 3/4 R shoulder extension, IR, supination, & wrist extension  4/16/25: >3/4 IR with palms touching lower back, supination, and wrist extension    5/13/25: >3/4 IR with palms touching back and supination         Pt. will increase RUE MMT to 5/5 in all deficit areas for improved fxnl use of dominant RUE throughout daily  occupational performance.  (Met)       Start:  02/27/25    Resolved:  05/15/25    2/27/25: 4/5 RUE IR, ER, sh ext, sh flexion, supination, wrist ext  4/16/25: 4/5 - 5/5 5/13/25: 5/5         Pt. will be able to wipe bottom following toileting using dominant RUE. (Ongoing)       Start:  02/27/25 2/27/25: Not able to wipe with RUE  4/16/25: Not able to wipe with RUE  5/13/25: Improving          Pt. will demonstrate improved FM coordination as evidence by completing Grooved Peg Board in </= 2:45 mins. (Met)       Start:  02/27/25    Resolved:  04/16/25 2/27/25: 3:23 mins  4/16/25: 2:14 mins         Pt. will improve handwriting legibility and maintain consistency of legibility to Fair+ for increased independence with legal, financial & medical mgmt. (Ongoing)       Start:  02/27/25 2/27/25: Poor, not consistent as writing progresses to R and down page  4/16/25: Fair  5/13/25: Fair/+            Long Term Goals (Cont'd)       Pt. will improve R side proprioception to >/= Fair+ for reduced risk of falls and improved fxnl use of dominant R side throughout daily occupational performance. (Ongoing)       Start:  02/27/25 2/27/25: Fair-/Fair  4/16/25: Fair  5/13/25: Fair         Pt. will perform global stretching HEP Ind. (Ongoing)       Start:  02/27/25 4/16/25: Initiated         Pt. will perform RUE strengthening HEP Ind. (Ongoing)       Start:  02/27/25               Long Term Goals (Cont'd)       Pt. will increase typing speed to >/= 22 wpm with >/=90% accuracy for 3 min typing duration to increase independence & efficiency with work requirements.  (Ongoing)       Start:  03/06/25       3/6/25: 10 wpm, 91% accuracy  4/16/25: NT  5/13/25:          Pt. will increase ease & efficiency donning clothing over RUE as evidence by rating task </= 1/7 on FTRF. (Ongoing)       Start:  03/06/25       3/6/25: 3/7  4/16/25: 3/7         Pt. will increase efficiency & safety donning pants/shorts in standing as  evidence by rating task </= 3/7 on FTRF. (Met)       Start:  03/06/25    Resolved:  04/16/25    3/6/25: 4/7  4/16/25: 3/7            Short Term Goals       Pt. will demonstrate improved FM coordination as evidence by completing Grooved Peg Board in </= 3:00 mins. (Met)       Start:  02/27/25    Resolved:  04/16/25 2/27/25: 3:23 mins  4/16/25: 2:14 mins         Pt. will improve handwriting legibility and maintain consistency of legibility to Fair for increased independence with legal, financial & medical mgmt. (Met)       Start:  02/27/25    Resolved:  04/16/25 2/27/25: Poor, not consistent as writing progresses to R and down page  4/16/25: Fair         OT will provide training/education on global stretching HEP to promote tissue lengthening necessary for improved postural control, fxnl mobility, & fxnl performance throughout ADLs/IADLs. (Ongoing)       Start:  02/27/25       Initiated          TBA Functional Task Recording Form with accompanying goal(s) to follow as needed. (Met)       Start:  02/27/25    Resolved:  03/06/25    Opening containers/bottles, cutting with knife, putting R arm thru shirt/jacket sleeve, wiping bottom with RUE, phone/computer use         OT will provide training/education on RUE proximal strengthening/stability HEP. (Ongoing)       Start:  02/27/25               Short Term Goals (Cont'd)       Pt. will increase typing speed to >/= 17 wpm with >/= 90% accuracy for 3 min typing duration to increase independence & efficiency with work requirements.  (Ongoing)       Start:  03/06/25       3/6/25: 10 wpm, 91% accuracy  4/16/25: NT  15 wpm, 95% acc (3 min trial),; 13 wpm 94%; wpm 11, 93%         Pt. will increase ease and independence with wiping bottom using dominant RUE as evidence by scoring task </= 2/7 on FTRF. (Ongoing)       Start:  03/06/25       3/6/25: 4/7  4/16/25: 5/7             Sukumar Holt, OT

## 2025-05-30 ENCOUNTER — CLINICAL SUPPORT (OUTPATIENT)
Dept: REHABILITATION | Facility: HOSPITAL | Age: 70
End: 2025-05-30
Payer: MEDICARE

## 2025-05-30 DIAGNOSIS — Z74.09 IMPAIRED FUNCTIONAL MOBILITY, BALANCE, GAIT, AND ENDURANCE: Primary | ICD-10-CM

## 2025-05-30 PROCEDURE — 97110 THERAPEUTIC EXERCISES: CPT | Mod: PO

## 2025-05-30 PROCEDURE — 97530 THERAPEUTIC ACTIVITIES: CPT | Mod: PO

## 2025-06-02 ENCOUNTER — CLINICAL SUPPORT (OUTPATIENT)
Dept: REHABILITATION | Facility: HOSPITAL | Age: 70
End: 2025-06-02
Payer: MEDICARE

## 2025-06-02 DIAGNOSIS — I63.412 CEREBROVASCULAR ACCIDENT (CVA) DUE TO EMBOLISM OF LEFT MIDDLE CEREBRAL ARTERY: ICD-10-CM

## 2025-06-02 DIAGNOSIS — R27.9 UNSPECIFIED LACK OF COORDINATION: ICD-10-CM

## 2025-06-02 DIAGNOSIS — Z78.9 ALTERATION IN INSTRUMENTAL ACTIVITIES OF DAILY LIVING (IADL): ICD-10-CM

## 2025-06-02 DIAGNOSIS — M62.81 MUSCLE WEAKNESS OF RIGHT UPPER EXTREMITY: ICD-10-CM

## 2025-06-02 DIAGNOSIS — Z78.9 ALTERATION IN PERFORMANCE OF ACTIVITIES OF DAILY LIVING: ICD-10-CM

## 2025-06-02 DIAGNOSIS — Z74.09 IMPAIRED FUNCTIONAL MOBILITY, BALANCE, GAIT, AND ENDURANCE: Primary | ICD-10-CM

## 2025-06-02 PROCEDURE — 97110 THERAPEUTIC EXERCISES: CPT | Mod: PO,CQ

## 2025-06-03 ENCOUNTER — CLINICAL SUPPORT (OUTPATIENT)
Dept: REHABILITATION | Facility: HOSPITAL | Age: 70
End: 2025-06-03
Payer: MEDICARE

## 2025-06-03 DIAGNOSIS — R27.9 UNSPECIFIED LACK OF COORDINATION: ICD-10-CM

## 2025-06-03 DIAGNOSIS — M62.81 MUSCLE WEAKNESS OF RIGHT UPPER EXTREMITY: ICD-10-CM

## 2025-06-03 DIAGNOSIS — Z78.9 ALTERATION IN PERFORMANCE OF ACTIVITIES OF DAILY LIVING: Primary | ICD-10-CM

## 2025-06-03 DIAGNOSIS — Z78.9 ALTERATION IN INSTRUMENTAL ACTIVITIES OF DAILY LIVING (IADL): ICD-10-CM

## 2025-06-03 PROCEDURE — 97530 THERAPEUTIC ACTIVITIES: CPT | Mod: KX,PO

## 2025-06-03 PROCEDURE — 97110 THERAPEUTIC EXERCISES: CPT | Mod: KX,PO

## 2025-06-04 ENCOUNTER — OFFICE VISIT (OUTPATIENT)
Dept: INTERNAL MEDICINE | Facility: CLINIC | Age: 70
End: 2025-06-04
Payer: MEDICARE

## 2025-06-04 VITALS
HEART RATE: 69 BPM | OXYGEN SATURATION: 98 % | HEIGHT: 67 IN | DIASTOLIC BLOOD PRESSURE: 90 MMHG | BODY MASS INDEX: 30.38 KG/M2 | SYSTOLIC BLOOD PRESSURE: 180 MMHG | WEIGHT: 193.56 LBS

## 2025-06-04 VITALS — SYSTOLIC BLOOD PRESSURE: 138 MMHG | DIASTOLIC BLOOD PRESSURE: 83 MMHG | HEART RATE: 64 BPM

## 2025-06-04 DIAGNOSIS — I10 HYPERTENSION, UNSPECIFIED TYPE: Primary | ICD-10-CM

## 2025-06-04 DIAGNOSIS — R11.0 NAUSEA: ICD-10-CM

## 2025-06-04 DIAGNOSIS — R42 DIZZINESS: ICD-10-CM

## 2025-06-04 PROCEDURE — 99213 OFFICE O/P EST LOW 20 MIN: CPT | Mod: S$PBB,,,

## 2025-06-04 PROCEDURE — 99999 PR PBB SHADOW E&M-EST. PATIENT-LVL IV: CPT | Mod: PBBFAC,,,

## 2025-06-04 PROCEDURE — 99214 OFFICE O/P EST MOD 30 MIN: CPT | Mod: PBBFAC

## 2025-06-04 RX ORDER — PREDNISOLONE/MOXIFLOX/BROMF/PF 1 %-0.5 %
DROPS OPHTHALMIC (EYE)
COMMUNITY
Start: 2025-05-15

## 2025-06-04 RX ORDER — ONDANSETRON 4 MG/1
4-8 TABLET, ORALLY DISINTEGRATING ORAL EVERY 6 HOURS PRN
Qty: 30 TABLET | Refills: 0 | Status: SHIPPED | OUTPATIENT
Start: 2025-06-04

## 2025-06-04 RX ORDER — HYDROCHLOROTHIAZIDE 12.5 MG/1
12.5 TABLET ORAL DAILY
Qty: 30 TABLET | Refills: 11 | Status: SHIPPED | OUTPATIENT
Start: 2025-06-04 | End: 2026-06-04

## 2025-06-04 NOTE — PROGRESS NOTES
[facility]  Progress Note    SUBJECTIVE:     Chief Complaint:   Chief Complaint   Patient presents with    Dizziness     Lightheadedness; 6/4/25    Altered Mental Status     Confused    Nausea       History of Present Illness:  69 y.o. male who  has a past medical history of Cancer (2022), HLD (hyperlipidemia), Hypertension, and Sleep apnea. presents to clinic today for follow up    #Confusion/nausea/dizziness  He has been on Wegovy 0.25mg for 5 weeks with last injection administered last Friday. He experiences vomiting during all four PT/OT sessions and nausea with walking. He has not been following proper eating instructions for the medication, failing to consume small frequent meals as directed. He also reports poor fluid intake. He has a history of cardiac issues with scheduled follow-up visit with cardiologist Dr. Brink on July 2nd. He is uncertain if he took his blood pressure medication today.    #HTN  He has a hx of elevated BP, with his regimen it was stated in previous notes that he was to be taking 25mg of HCTZ along with his existing regimen. He is also interested in joining digital HTN program.        Allergies:  Review of patient's allergies indicates:  No Known Allergies    Home Medications:  Current Outpatient Medications on File Prior to Visit   Medication Sig    atorvastatin (LIPITOR) 20 MG tablet Take 1 tablet (20 mg total) by mouth every evening.    [id number]-[id number] no.225 [phone] mg-mcg-mcg-mg Tab as directed Orally    buPROPion (WELLBUTRIN SR) 150 MG TBSR 12 hr tablet Take 1 tablet by mouth twice a day    buPROPion (WELLBUTRIN SR) 150 MG TBSR 12 hr tablet Take 2 tablets (300 mg total) by mouth every morning AND 2 tablets (300 mg total) every evening.    buPROPion (WELLBUTRIN XL) 150 MG TB24 tablet Take 150 mg by mouth once daily.    carvediloL (COREG) 25 MG tablet Take 1 tablet (25 mg total) by mouth 2 (two) times daily.    loratadine (CLARITIN) 10 mg  tablet 1 tablet Orally Once a day for 30 day(s)    prednisolone/moxiflox/bromf/PF (OIOKAXACLRI-RRTLJYUZ-MRFYL,PF,) 1-0.5-0.09 % Drop Use as directed STARTING 2 DAYS PRIOR TO SURGERY insert 1 drop in surgical eye three times daily AND CONTINUE FOR 30 DAYS AFTER    semaglutide, weight loss, (WEGOVY) 0.25 mg/0.5 mL PnIj Inject 0.25 mg into the skin weekly    telmisartan (MICARDIS) 80 MG Tab TAKE ONE TABLET BY MOUTH EVERY EVENING    turmeric 400 mg Cap     zinc gluconate 50 mg tablet 1 tablet.    abiraterone (ZYTIGA) 500 mg Tab Take 1,000 mg by mouth. (Patient not taking: Reported on 6/4/2025.)    ascorbic acid, vitamin C, (VITAMIN C) 1000 MG tablet 1 tablet. (Patient not taking: Reported on 6/4/2025)    prednisoLONE acetate (PRED FORTE) 1 % DrpS Place 1 drop into the right eye 3 (three) times daily for 10 days (Patient not taking: Reported on 6/4/2025)    predniSONE (DELTASONE) 5 MG tablet Take 5 mg by mouth 2 (two) times daily. (Patient not taking: Reported on 6/4/2025)    RSVPreF3 antigen-AS01E, PF, (AREXVY, PF,) 120 mcg/0.5 mL SusR vaccine Inject into the muscle. (Patient not taking: Reported on 6/4/2025)    RSVPreF3 antigen-AS01E, PF, (AREXVY, PF,) 120 mcg/0.5 mL SusR vaccine Inject into the muscle. (Patient not taking: Reported on 6/4/2025)    semaglutide (OZEMPIC) 0.25 mg or 0.5 mg (2 mg/3 mL) pen injector Inject 0.5 mg into the skin once weekly (Patient not taking: Reported on 6/4/2025)     No current facility-administered medications on file prior to visit.       Past Medical History:   Diagnosis Date    Cancer 2022    prostate    HLD (hyperlipidemia)     Hypertension     Sleep apnea      Past Surgical History:   Procedure Laterality Date    CEREBRAL ANGIOGRAM N/A 03/26/2023    Procedure: ANGIOGRAM-CEREBRAL;  Surgeon: Jocelyne Surgeon;  Location: [facility];  Service: Anesthesiology;  Laterality: N/A;    INSERTION, ICD, SINGLE CHAMBER Left 11/30/2023    Procedure: Insertion, ICD, Single Chamber;  Surgeon: Florentino  Juan GUILLEN MD;  Location: [facility];  Service: Cardiology;  Laterality: Left;  Hypertrophic CMP, Single ICD, BIO(DX ld), [place], [place], 3 Prep    PROSTATECTOMY       Family History   Problem Relation Name Age of Onset    Hypertension Brother       Social History[1]       OBJECTIVE:     Vital Signs:  Pulse: 69 (06/04/25 1335)  BP: (!) 180/90 (06/04/25 1335)  SpO2: 98 % (06/04/25 1335)    Physical Exam  Constitutional:       General: He is not in acute distress.     Appearance: Normal appearance. He is not toxic-appearing.   HENT:      Head: Normocephalic and atraumatic.      Right Ear: External ear normal.      Left Ear: External ear normal.      Nose: Nose normal.      Mouth/Throat:      Mouth: Mucous membranes are moist.   Eyes:      Extraocular Movements: Extraocular movements intact.   Cardiovascular:      Rate and Rhythm: Normal rate and regular rhythm.      Pulses: Normal pulses.      Heart sounds: Normal heart sounds.   Pulmonary:      Effort: Pulmonary effort is normal. No respiratory distress.   Abdominal:      General: Abdomen is flat.      Palpations: Abdomen is soft.      Tenderness: There is no abdominal tenderness.   Musculoskeletal:      Cervical back: Normal range of motion and neck supple.   Skin:     General: Skin is warm.      Findings: No bruising or erythema.   Neurological:      General: No focal deficit present.      Mental Status: He is alert.   Psychiatric:         Mood and Affect: Mood normal.         Laboratory:  Hemoglobin A1C   Date Value Ref Range Status   03/26/2023 5.2 4.0 - 5.6 % Final     Comment:     ADA Screening Guidelines:  5.7-6.4%  Consistent with prediabetes  >or=6.5%  Consistent with diabetes    High levels of fetal hemoglobin interfere with the HbA1C  assay. Heterozygous hemoglobin variants (HbS, HgC, etc)do  not significantly interfere with this assay.   However, presence of multiple variants may affect accuracy.         A/P:    Peter was seen today for dizziness, altered mental  status and nausea.    Diagnoses and all orders for this visit:    Hypertension, unspecified type  -     hydroCHLOROthiazide 12.5 MG Tab; Take 1 tablet (12.5 mg total) by mouth once daily.  -     Hypertension [facility] ([facility] Enrollment Order    Dizziness  -     Comprehensive Metabolic Panel; Future  -     CBC Auto Differential; Future    Nausea  -     ondansetron (ZOFRAN-ODT) 4 MG TbDL; Dissolve 1-2 tablets (4-8 mg total) by mouth every 6 (six) hours as needed.    Assessed symptoms of nausea, vomiting, and dizziness in context of Wegovy use. Prescribed Zofran (ondansetron) as needed for nausea   Determined symptoms likely due to side effects of Wegovy, inadequate hydration, and improper eating habits rather than cardiac issues.  Discontinued Wegovy (semaglutide) 0.25 mg weekly injection due to persistent side effects and poor tolerability.  Considered alternative weight loss medication (Zepbound) for future use after a period of rest.  Restarted HCTZ 12.5 mg daily for BP management.  Contact clinic if symptoms continue to worsen after hydration and stoppage of Wegovy    Follow up in 1-2 months for BP check    This note was generated with the assistance of ambient listening technology. Verbal consent was obtained by the patient and accompanying visitor(s) for the recording of patient appointment to facilitate this note. I attest to having reviewed and edited the generated note for accuracy, though some syntax or spelling errors may persist. Please contact the author of this note for any clarification.      Bernard Kurtz MD   Family Medicine   [facility]           [1]   Social History  Tobacco Use    Smoking status: Never    Smokeless tobacco: Never   Substance Use Topics    Alcohol use: Yes     Alcohol/week: 18.0 standard drinks of alcohol     Types: 18 Drinks containing 0.5 oz of alcohol per week     Comment: 2-3 weekly    Drug use: Never

## 2025-06-06 DIAGNOSIS — I42.2 APICAL VARIANT HYPERTROPHIC CARDIOMYOPATHY: ICD-10-CM

## 2025-06-06 DIAGNOSIS — I63.9 STROKE: ICD-10-CM

## 2025-06-06 NOTE — PROGRESS NOTES
Outpatient Rehab    Physical Therapy Discharge    Patient Name: Peter Munoz  MRN: [id number]  YOB: 1955  Encounter Date: 6/9/2025    Therapy Diagnosis:   Encounter Diagnosis   Name Primary?    Impaired functional mobility, balance, gait, and endurance Yes     Physician: Racquel Lazcano MD    Physician Orders: Eval and Treat  Medical Diagnosis: Cerebrovascular accident (CVA) due to embolism of left middle cerebral artery    Visit # / Visits Authorized:  19 / 30  Insurance Authorization Period: 2/27/2025 to 12/31/2025  Date of Evaluation: 2/27/2025  Plan of Care Certification: 4/25/2025 to 6/20/2025      PT/PTA: PT   Number of PTA visits since last PT visit:0  Time In: 1022   Time Out: 1115  Total Time (in minutes): 53   Total Billable Time (in minutes):      FOTO:  Intake Score:  %  Survey Score 2:  %  Survey Score 3:  %    Precautions:     Standard, fall, cognition       Subjective   feeling good, feels prepared for discharge.  Pain reported as 0/10.      Objective               Evaluation 4/25/2025 5/30/2024 6/9/2025   Single Limb Stance R LE 1 second  (<10 sec = HIGH FALL RISK) 4 seconds  8 seconds  9 seconds   Single Limb Stance L LE 1 second  (<10 sec = HIGH FALL RISK) 9 seconds  12 seconds  4 seconds         Evaluation 4/25/2025 5/30/2035 6/9/2025   30 second Chair Rise  (adults > 59 y/o) 10 completed with no arms 16 completed with no arms  22 completed with no arms  22 completed with no arms    5 times sit-stand  (adults 18-65 y/o) 15 seconds with no UE support   >12 sec= fall risk for general elderly  >16 sec= fall risk for Parkinson's disease  >10 sec= balance/vestibular dysfunction (<59 y/o)  >14.2 sec= balance/vestibular dysfunction (>59 y/o)  >12 sec= fall risk for CVA 10 seconds  6 seconds  6 seconds                  Evaluation 4/25/2025 5/30/2035 6/9/2025   6 MWT  1250 feet  1437 1649 feet 1650 feet    Self Selected Walking Speed 0.86 m/sec (6m/7s) 1.0 m/sec (6m/6s) 1.0 m/sec (6m/6s)  NT   Fast Walking Speed 1.0 m/sec (6m/6s) 1.4 m/sec (6m/4s)  1.4 m/sec (6m/4s)  NT         Functional Gait Assessment:   1. Gait on level surface =  3  2. Change in Gait Speed = 3  3. Gait with horizontal head turns  = 3  4. Gait with vertical head turns = 3  5. Gait with pivot turns = 3  6. Step over obstacle = 3  7. Gait with Narrow JOHN = 1  8. Gait with eyes closed = 3  9. Ambulating Backwards = 3  10. Steps = 3Provider photo       Score 28/30   Score:   <22/30 fall risk   <20/30 fall risk in older adults   <18/30 fall risk in Parkinsons     Treatment:  Therapeutic Exercise  TE 1: 10 minutes ambulation on treadmill at 2.0 MPH at 0% incline, BUE support  Balance/Neuromuscular Re-Education  NMR 1: 3 x 30 seconds BLE leading tandem stance in corner  NMR 2: 4 laps tandem ambulation  NMR 3: 6 marching with three secind holds, CGA  Therapeutic Activity  TA 1: time for objective measures and FOTO  TA 2: time to discuss the importance of performing HEP on daily basis to maintain progress with therapy following DC  TA 3: 3 x 10 reps sit to stands from mat, no UE support, holding 10# weight    Time Entry(in minutes):  Neuromuscular Re-Education Time Entry: 15  Therapeutic Activity Time Entry: 28  Therapeutic Exercise Time Entry: 10    Goals:   Resolved       Short term goals        Patient to be independent with established home exercise program  (Met)       Start:  02/27/25    Expected End:  03/27/25    Resolved:  04/25/25         Patient to improve hip flexion MMT by 1/3  (Met)       Start:  02/27/25    Expected End:  03/27/25    Resolved:  04/25/25         Patient to improve right  knee flexion MMT by 1/3 (Met)       Start:  02/27/25    Expected End:  03/27/25    Resolved:  04/25/25         Patient to improve FGA score to 21/30 (Met)       Start:  02/27/25    Expected End:  03/27/25    Resolved:  04/25/25            long term goals        Patient to improve right hip flexion strength MMT score by 2/3  (Met)        Start:  02/27/25    Expected End:  04/24/25    Resolved:  05/30/25         Patient to improve FGA score to 25/30 (Met)       Start:  02/27/25    Expected End:  04/24/25    Resolved:  05/30/25         Patient to improve 6 MWT distance to 1450 feet  (Met)       Start:  02/27/25    Expected End:  04/24/25    Resolved:  05/30/25         patient to improve BLE SLS time to 5 seconds  (Met)       Start:  02/27/25    Expected End:  04/24/25    Resolved:  05/30/25               PHYSICAL THERAPY DISCHARGE SUMMARY   Total Visits:20  Missed Visits:2  Cancelled Visits: 2    Status Towards Goals Met: The patient met all of his short and long term goals at this time     Goals Not achieved and why:   Peter has made excellent progress with therapy at this time. He has currently maximized the benefits of therapy. The patient's SSWS places him in the community ambulator category, The patient's FGA score places him outside of the elevated falls risk category. The patient's 6 MWT also has shown significant improvements over the course of therapy and indicates improved CV endurance and LE strength. The patient was provided advanced home exercise program for balance and BLE strength/ endurance. The patient verbalizes/ demonstrates understanding and was provided education on the importance of performing home exercise program on a daily basis to maintain progress made with therapy following discharge.         Discharge reason : Met goals and Patient has maximized benefit from PT at this time    PLAN   This patient is discharged from Outpatient Physical Therapy Services.     Josette Murphy, PT  06/09/2025      Josette Murphy, PT

## 2025-06-09 ENCOUNTER — CLINICAL SUPPORT (OUTPATIENT)
Dept: REHABILITATION | Facility: HOSPITAL | Age: 70
End: 2025-06-09
Payer: MEDICARE

## 2025-06-09 DIAGNOSIS — Z74.09 IMPAIRED FUNCTIONAL MOBILITY, BALANCE, GAIT, AND ENDURANCE: Primary | ICD-10-CM

## 2025-06-09 PROCEDURE — 97530 THERAPEUTIC ACTIVITIES: CPT | Mod: PO

## 2025-06-09 PROCEDURE — 97112 NEUROMUSCULAR REEDUCATION: CPT | Mod: PO

## 2025-06-09 PROCEDURE — 97110 THERAPEUTIC EXERCISES: CPT | Mod: PO

## 2025-06-10 ENCOUNTER — CLINICAL SUPPORT (OUTPATIENT)
Dept: REHABILITATION | Facility: HOSPITAL | Age: 70
End: 2025-06-10
Payer: MEDICARE

## 2025-06-10 DIAGNOSIS — M62.81 MUSCLE WEAKNESS OF RIGHT UPPER EXTREMITY: ICD-10-CM

## 2025-06-10 DIAGNOSIS — R27.9 UNSPECIFIED LACK OF COORDINATION: ICD-10-CM

## 2025-06-10 DIAGNOSIS — Z78.9 ALTERATION IN INSTRUMENTAL ACTIVITIES OF DAILY LIVING (IADL): ICD-10-CM

## 2025-06-10 DIAGNOSIS — Z78.9 ALTERATION IN PERFORMANCE OF ACTIVITIES OF DAILY LIVING: Primary | ICD-10-CM

## 2025-06-10 PROCEDURE — 97110 THERAPEUTIC EXERCISES: CPT | Mod: KX,PO

## 2025-06-10 PROCEDURE — 97112 NEUROMUSCULAR REEDUCATION: CPT | Mod: KX,PO

## 2025-06-10 PROCEDURE — 97530 THERAPEUTIC ACTIVITIES: CPT | Mod: KX,PO

## 2025-06-11 NOTE — PROGRESS NOTES
Outpatient Rehab    Occupational Therapy Visit    Patient Name: Antoine Munoz  MRN: [id number]  YOB: 1955  Encounter Date: 6/10/2025    Therapy Diagnosis:   Encounter Diagnoses   Name Primary?    Alteration in performance of activities of daily living Yes    Alteration in instrumental activities of daily living (IADL)     Unspecified lack of coordination     Muscle weakness of right upper extremity      Physician: Racquel Lazcano MD    Physician Orders: Eval and Treat  Medical Diagnosis: Cerebrovascular accident (CVA) due to embolism of left middle cerebral artery  Surgical Diagnosis: Not applicable for this Episode   Surgical Date: Not applicable for this Episode    Visit # / Visits Authorized: 21 / 30  Insurance Authorization Period: 3/6/2025 to 12/31/2025  Date of Evaluation: 2/27/2025  Plan of Care Certification: 5/13/2025 to 7/24/2025      Time In: 1036   Time Out: 1115  Total Time (in minutes): 39   Total Billable Time (in minutes): 39           Subjective   Pt. reported episode of illness last Thursday as reason for canceling OT appt.; pt. stated he was dizzy, unable to concentrate or coherently communicate, weak, and overall unwell; pt. saw PCP that day and deemed to be dehydrated and not eating properly, per pt. report; pt. reported feeling better the next day after resting and taking in fluids. Pt. wishes to continue to work on R hand dexterity for typing and phone use and UB strengthening..             Treatment:  Therapeutic Exercise  TE 1: For improved cardiovascular function and endurance, UE bike at 3.0 level for 10 min duration with focus on maintaining consistent pace, pt. switching directions of cycling at 5 min antoine  Therapeutic Activity  TA 1: R in hand manipulation requiring finger<>palm translation with stabilization for total of x6 small pegs in hand at time, then pt. required to place into small peg board according to color sequence, min/mod dropping from palm  TA 2: R 3-pt  pinch against min resistive tweezers to remove small pegs from board > place into container at arms length on table, initially difficulty with force grading and sustaining pinch for appropriate duration while reaching, improved accuracy and performance with progressive reps  Balance/Neuromuscular Re-Education  NMR 1: Seated at table top, VMI task challenging processing, visuomotor reaction speed, digit isolation, motor planning, motor control, and intrinsic strength, R individual digit PIP flexion/extension AAROM on table top based on color of target to corresponding color associated with individual digit; then R individual digit AROM abd/adduction over small obstacle based on color of stimuli presented, several reps each movement pattern with increasing pace as task duration progressed    Time Entry(in minutes):  Neuromuscular Re-Education Time Entry: 14  Therapeutic Activity Time Entry: 15  Therapeutic Exercise Time Entry: 10    Assessment & Plan   Assessment: Pt. tolerated and responded well to all tx ax's this date, with improved visuomotor reaction speed, processing, sequencing, motor control, proprioception, interdigit coordination, and digit isolation observed with progressive reps/duration of tasks challenging R hand dexterity and VMI.  Evaluation/Treatment Tolerance: Patient tolerated treatment well    The patient will continue to benefit from skilled outpatient occupational therapy in order to address the deficits listed in the problem list on the initial evaluation, provide patient and family education, and maximize the patients level of independence in the home and community environments.     The patient's spiritual, cultural, and educational needs were considered, and the patient is agreeable to the plan of care and goals.           Plan: OT plan to continue 2x/week to address RUE sensorimotor, proprioceptive/kinesthetic movement awareness, global joint stiffness/mm tightness, balance, fxnl ax  tolerance, and executive functioning deficits to reduce risk of falls, further debility, and secondary musculoskeletal damage/impairments, increase safety, independence, and efficiency with ADLs, IADLs, work, and  roles/responsibilities    Goals:   Active       Long Term Goals       Pt. will increase AROM in all deficits areas of RUE. (Ongoing)       Start:  02/27/25 2/27/25: >/= 3/4 R shoulder extension, IR, supination, & wrist extension  4/16/25: >3/4 IR with palms touching lower back, supination, and wrist extension    5/13/25: >3/4 IR with palms touching back and supination         Pt. will increase RUE MMT to 5/5 in all deficit areas for improved fxnl use of dominant RUE throughout daily occupational performance.  (Met)       Start:  02/27/25    Resolved:  05/15/25    2/27/25: 4/5 RUE IR, ER, sh ext, sh flexion, supination, wrist ext  4/16/25: 4/5 - 5/5  5/13/25: 5/5         Pt. will be able to wipe bottom following toileting using dominant RUE. (Ongoing)       Start:  02/27/25 2/27/25: Not able to wipe with RUE  4/16/25: Not able to wipe with RUE  5/13/25: Improving          Pt. will demonstrate improved FM coordination as evidence by completing Grooved Peg Board in </= 2:45 mins. (Met)       Start:  02/27/25    Resolved:  04/16/25 2/27/25: 3:23 mins  4/16/25: 2:14 mins         Pt. will improve handwriting legibility and maintain consistency of legibility to Fair+ for increased independence with legal, financial & medical mgmt. (Ongoing)       Start:  02/27/25 2/27/25: Poor, not consistent as writing progresses to R and down page  4/16/25: Fair  5/13/25: Fair/+            Long Term Goals (Cont'd)       Pt. will improve R side proprioception to >/= Fair+ for reduced risk of falls and improved fxnl use of dominant R side throughout daily occupational performance. (Ongoing)       Start:  02/27/25 2/27/25: Fair-/Fair  4/16/25: Fair  5/13/25: Fair         Pt. will perform global  stretching HEP Ind. (Ongoing)       Start:  02/27/25 4/16/25: Initiated         Pt. will perform RUE strengthening HEP Ind. (Ongoing)       Start:  02/27/25               Long Term Goals (Cont'd)       Pt. will increase typing speed to >/= 22 wpm with >/=90% accuracy for 3 min typing duration to increase independence & efficiency with work requirements.  (Ongoing)       Start:  03/06/25       3/6/25: 10 wpm, 91% accuracy  4/16/25: NT  5/13/25:          Pt. will increase ease & efficiency donning clothing over RUE as evidence by rating task </= 1/7 on FTRF. (Ongoing)       Start:  03/06/25       3/6/25: 3/7  4/16/25: 3/7         Pt. will increase efficiency & safety donning pants/shorts in standing as evidence by rating task </= 3/7 on FTRF. (Met)       Start:  03/06/25    Resolved:  04/16/25    3/6/25: 4/7 4/16/25: 3/7            Short Term Goals       Pt. will demonstrate improved FM coordination as evidence by completing Grooved Peg Board in </= 3:00 mins. (Met)       Start:  02/27/25    Resolved:  04/16/25 2/27/25: 3:23 mins  4/16/25: 2:14 mins         Pt. will improve handwriting legibility and maintain consistency of legibility to Fair for increased independence with legal, financial & medical mgmt. (Met)       Start:  02/27/25    Resolved:  04/16/25 2/27/25: Poor, not consistent as writing progresses to R and down page  4/16/25: Fair         OT will provide training/education on global stretching HEP to promote tissue lengthening necessary for improved postural control, fxnl mobility, & fxnl performance throughout ADLs/IADLs. (Ongoing)       Start:  02/27/25       Initiated          TBA Functional Task Recording Form with accompanying goal(s) to follow as needed. (Met)       Start:  02/27/25    Resolved:  03/06/25    Opening containers/bottles, cutting with knife, putting R arm thru shirt/jacket sleeve, wiping bottom with RUE, phone/computer use         OT will provide training/education on RUE  proximal strengthening/stability HEP. (Ongoing)       Start:  02/27/25               Short Term Goals (Cont'd)       Pt. will increase typing speed to >/= 17 wpm with >/= 90% accuracy for 3 min typing duration to increase independence & efficiency with work requirements.  (Ongoing)       Start:  03/06/25       3/6/25: 10 wpm, 91% accuracy  4/16/25: NT  15 wpm, 95% acc (3 min trial),; 13 wpm 94%; wpm 11, 93%         Pt. will increase ease and independence with wiping bottom using dominant RUE as evidence by scoring task </= 2/7 on FTRF. (Ongoing)       Start:  03/06/25       3/6/25: 4/7  4/16/25: 5/7             Sukumar Holt OT

## 2025-06-30 ENCOUNTER — TELEPHONE (OUTPATIENT)
Dept: INTERNAL MEDICINE | Facility: CLINIC | Age: 70
End: 2025-06-30
Payer: MEDICARE

## 2025-06-30 NOTE — TELEPHONE ENCOUNTER
Copied from CRM #[id number]. Topic: Medications - Medication Question  >> Jun 30, 2025 10:05 AM Eleazar wrote:  Type: Patient Call Back    Who called: patient    What is the request in detail: call patient about a Rx refill please    Can the clinic reply by [facility]?    Would the patient rather a call back or a response via [facility]? call    Best call back number: [phone]    Additional Information:

## 2025-07-02 ENCOUNTER — DOCUMENTATION ONLY (OUTPATIENT)
Dept: REHABILITATION | Facility: HOSPITAL | Age: 70
End: 2025-07-02
Payer: MEDICARE

## 2025-07-02 DIAGNOSIS — R63.4 WEIGHT LOSS: Primary | ICD-10-CM

## 2025-07-02 NOTE — TELEPHONE ENCOUNTER
Copied from CRM #[id number]. Topic: Medications - Medication Refill  >> Jul 2, 2025  8:49 AM Millicent wrote:      Can the clinic reply in [facility]: Y      Please refill the medication(s) listed below. Please call the patient when the prescription(s) is ready for  at this phone number           Medication #1 GOP 1    Medication #2       Preferred Pharmacy: [facility]/pharmacy #[id number] - [place] - [address]  [address] [place] [address]  Phone: [phone] Fax: [phone]  Hours: Not open 24 hours

## 2025-07-02 NOTE — TELEPHONE ENCOUNTER
"Called and spoke to patient via telephone call. Pt informed that he spoke to Dr. Kurtz about replacing the Wegovy he was taking with an alternative, but pt could not recall the name of the med. Pt stated Dr. Kurtz wanted to wait until after his surgery.      LOV notes states considered: "Considered alternative weight loss medication (Zepbound) for future use after a period of rest."    Unsure if you spoke to pt about sending Zepbound through [facility]    Zepbound pended.     "

## 2025-07-02 NOTE — PROGRESS NOTES
[facility]   Occupational Therapy Neurological Rehabilitation   No Show   Name: Peter Munoz  MRN: [id number]  Today's Date: 7/2/2025    Pt with no show to scheduled occupational therapy appointment.     Will follow up at next scheduled appointment.     ALISSA Vazquez 7/2/2025

## 2025-07-03 RX ORDER — TIRZEPATIDE 2.5 MG/.5ML
2.5 INJECTION, SOLUTION SUBCUTANEOUS
Qty: 2 ML | Refills: 0 | Status: SHIPPED | OUTPATIENT
Start: 2025-07-03 | End: 2025-07-31

## 2025-07-03 NOTE — TELEPHONE ENCOUNTER
Pt would like to proceed with sending Zepbound through [facility].    Med pended.

## 2025-07-23 DIAGNOSIS — R63.4 WEIGHT LOSS: ICD-10-CM

## 2025-07-24 RX ORDER — TIRZEPATIDE 2.5 MG/.5ML
2.5 INJECTION, SOLUTION SUBCUTANEOUS
Qty: 2 ML | Refills: 0 | Status: SHIPPED | OUTPATIENT
Start: 2025-07-24 | End: 2025-08-21

## 2025-07-24 NOTE — TELEPHONE ENCOUNTER
Refill Encounter  Allergies: Confirmed    PCP Visits:   Recent Visits  Date Type Provider Dept   06/04/25 Office Visit Bernard Kurtz MD [facility]   Showing recent visits within past 360 days and meeting all other requirements  Future Appointments  No visits were found meeting these conditions.  Showing future appointments within next 720 days and meeting all other requirements     Last 3 Blood Pressure:   BP Readings from Last 3 Encounters:   06/04/25 (!) 180/90   06/03/25 138/83   05/22/25 (!) 158/93     Preferred Pharmacy:   [facility] [id number]   [address] [place]  [facility] [address]  Phone: [phone] Fax: [phone]    Requested RX:  Requested Prescriptions     Pending Prescriptions Disp Refills    tirzepatide, weight loss, (ZEPBOUND) 2.5 mg/0.5 mL Soln 2 mL 0     Sig: Inject 0.5 mLs (2.5 mg total) into the skin every 7 days.      RX Route: Normal

## 2025-07-31 DIAGNOSIS — I10 PRIMARY HYPERTENSION: ICD-10-CM

## 2025-07-31 RX ORDER — CARVEDILOL 25 MG/1
25 TABLET ORAL 2 TIMES DAILY
Qty: 180 TABLET | Refills: 3 | Status: SHIPPED | OUTPATIENT
Start: 2025-07-31

## 2025-08-18 DIAGNOSIS — R63.4 WEIGHT LOSS: ICD-10-CM

## 2025-08-18 RX ORDER — TIRZEPATIDE 2.5 MG/.5ML
2.5 INJECTION, SOLUTION SUBCUTANEOUS
Qty: 2 ML | Refills: 0 | Status: SHIPPED | OUTPATIENT
Start: 2025-08-18 | End: 2025-09-15

## 2025-08-27 ENCOUNTER — CLINICAL SUPPORT (OUTPATIENT)
Dept: CARDIOLOGY | Facility: HOSPITAL | Age: 70
End: 2025-08-27
Payer: MEDICARE

## 2025-08-27 ENCOUNTER — CLINICAL SUPPORT (OUTPATIENT)
Dept: CARDIOLOGY | Facility: HOSPITAL | Age: 70
End: 2025-08-27
Attending: INTERNAL MEDICINE
Payer: MEDICARE

## 2025-08-27 DIAGNOSIS — Z95.810 PRESENCE OF AUTOMATIC (IMPLANTABLE) CARDIAC DEFIBRILLATOR: ICD-10-CM

## 2025-08-27 DIAGNOSIS — I42.2 OTHER HYPERTROPHIC CARDIOMYOPATHY: ICD-10-CM

## 2025-08-27 PROCEDURE — 93295 DEV INTERROG REMOTE 1/2/MLT: CPT | Mod: ,,, | Performed by: INTERNAL MEDICINE

## 2025-08-28 LAB
OHS CV AF BURDEN PERCENT: < 1
OHS CV DC REMOTE DEVICE TYPE: NORMAL
OHS CV RV PACING PERCENT: 0 %

## 2025-09-03 ENCOUNTER — NURSE TRIAGE (OUTPATIENT)
Dept: ADMINISTRATIVE | Facility: CLINIC | Age: 70
End: 2025-09-03
Payer: MEDICARE

## (undated) DEVICE — ADHESIVE DERMABOND ADVANCED

## (undated) DEVICE — ELECTRODE REM PLYHSV RETURN 9

## (undated) DEVICE — SHEATH SAFE ULTRA 9FR

## (undated) DEVICE — DRESSING AQUACEL FOAM 5 X 5

## (undated) DEVICE — PACK PACER PERMANENT OMC

## (undated) DEVICE — VISIPAQUE CONTRAST 320MG/100ML

## (undated) DEVICE — DRAPE INCISE IOBAN 2 23X17IN

## (undated) DEVICE — SAFESHEATH II 8FR 13CM

## (undated) DEVICE — SLING SWATHE UNIVERSAL FOAM

## (undated) DEVICE — PAD DEFIB CADENCE ADULT R2